# Patient Record
Sex: MALE | Race: WHITE | Employment: OTHER | URBAN - METROPOLITAN AREA
[De-identification: names, ages, dates, MRNs, and addresses within clinical notes are randomized per-mention and may not be internally consistent; named-entity substitution may affect disease eponyms.]

---

## 2017-05-10 RX ORDER — SUMATRIPTAN 50 MG/1
50 TABLET, FILM COATED ORAL DAILY PRN
COMMUNITY
End: 2022-01-01

## 2017-05-10 RX ORDER — CARBIDOPA AND LEVODOPA 25; 100 MG/1; MG/1
1.5 TABLET ORAL 3 TIMES DAILY
COMMUNITY

## 2017-05-10 RX ORDER — OXYCODONE AND ACETAMINOPHEN 5; 325 MG/1; MG/1
1 TABLET ORAL EVERY 4 HOURS PRN
Status: ON HOLD | COMMUNITY
End: 2022-01-01

## 2017-05-10 RX ORDER — SUMATRIPTAN SUCCINATE 6 MG/.5ML
6 INJECTION, SOLUTION SUBCUTANEOUS DAILY PRN
Status: ON HOLD | COMMUNITY
End: 2022-01-01

## 2017-05-11 ENCOUNTER — HOSPITAL ENCOUNTER (OUTPATIENT)
Facility: CLINIC | Age: 67
Discharge: HOME | End: 2017-05-12
Attending: ORTHOPAEDIC SURGERY | Admitting: ORTHOPAEDIC SURGERY
Payer: COMMERCIAL

## 2017-05-11 ENCOUNTER — SURGERY (OUTPATIENT)
Age: 67
End: 2017-05-11

## 2017-05-11 ENCOUNTER — ANESTHESIA (OUTPATIENT)
Dept: SURGERY | Facility: CLINIC | Age: 67
End: 2017-05-11
Payer: COMMERCIAL

## 2017-05-11 ENCOUNTER — ANESTHESIA EVENT (OUTPATIENT)
Dept: SURGERY | Facility: CLINIC | Age: 67
End: 2017-05-11
Payer: COMMERCIAL

## 2017-05-11 DIAGNOSIS — S92.301A: Primary | ICD-10-CM

## 2017-05-11 PROCEDURE — 40000935 ZZH STATISTIC OUTPATIENT (NON-OBS) EVE

## 2017-05-11 PROCEDURE — 37000009 ZZH ANESTHESIA TECHNICAL FEE, EACH ADDTL 15 MIN: Performed by: ORTHOPAEDIC SURGERY

## 2017-05-11 PROCEDURE — S0077 INJECTION, CLINDAMYCIN PHOSP: HCPCS | Performed by: ORTHOPAEDIC SURGERY

## 2017-05-11 PROCEDURE — 25000128 H RX IP 250 OP 636: Performed by: ORTHOPAEDIC SURGERY

## 2017-05-11 PROCEDURE — 71000012 ZZH RECOVERY PHASE 1 LEVEL 1 FIRST HR: Performed by: ORTHOPAEDIC SURGERY

## 2017-05-11 PROCEDURE — 71000013 ZZH RECOVERY PHASE 1 LEVEL 1 EA ADDTL HR: Performed by: ORTHOPAEDIC SURGERY

## 2017-05-11 PROCEDURE — 36000058 ZZH SURGERY LEVEL 3 EA 15 ADDTL MIN: Performed by: ORTHOPAEDIC SURGERY

## 2017-05-11 PROCEDURE — 37000008 ZZH ANESTHESIA TECHNICAL FEE, 1ST 30 MIN: Performed by: ORTHOPAEDIC SURGERY

## 2017-05-11 PROCEDURE — 25000128 H RX IP 250 OP 636: Performed by: NURSE ANESTHETIST, CERTIFIED REGISTERED

## 2017-05-11 PROCEDURE — 40000170 ZZH STATISTIC PRE-PROCEDURE ASSESSMENT II: Performed by: ORTHOPAEDIC SURGERY

## 2017-05-11 PROCEDURE — 40000936 ZZH STATISTIC OUTPATIENT (NON-OBS) NIGHT

## 2017-05-11 PROCEDURE — S0020 INJECTION, BUPIVICAINE HYDRO: HCPCS | Performed by: ORTHOPAEDIC SURGERY

## 2017-05-11 PROCEDURE — 25000125 ZZHC RX 250: Performed by: ANESTHESIOLOGY

## 2017-05-11 PROCEDURE — 27210794 ZZH OR GENERAL SUPPLY STERILE: Performed by: ORTHOPAEDIC SURGERY

## 2017-05-11 PROCEDURE — 25800025 ZZH RX 258: Performed by: NURSE ANESTHETIST, CERTIFIED REGISTERED

## 2017-05-11 PROCEDURE — 25000125 ZZHC RX 250: Performed by: NURSE ANESTHETIST, CERTIFIED REGISTERED

## 2017-05-11 PROCEDURE — C1713 ANCHOR/SCREW BN/BN,TIS/BN: HCPCS | Performed by: ORTHOPAEDIC SURGERY

## 2017-05-11 PROCEDURE — 36000056 ZZH SURGERY LEVEL 3 1ST 30 MIN: Performed by: ORTHOPAEDIC SURGERY

## 2017-05-11 PROCEDURE — 25000125 ZZHC RX 250: Performed by: ORTHOPAEDIC SURGERY

## 2017-05-11 PROCEDURE — 27110028 ZZH OR GENERAL SUPPLY NON-STERILE: Performed by: ORTHOPAEDIC SURGERY

## 2017-05-11 PROCEDURE — 25000132 ZZH RX MED GY IP 250 OP 250 PS 637: Performed by: ORTHOPAEDIC SURGERY

## 2017-05-11 PROCEDURE — 25800025 ZZH RX 258: Performed by: ORTHOPAEDIC SURGERY

## 2017-05-11 PROCEDURE — 27210995 ZZH RX 272: Performed by: ORTHOPAEDIC SURGERY

## 2017-05-11 PROCEDURE — 40000934 ZZH STATISTIC OUTPATIENT (NON-OBS) DAY

## 2017-05-11 DEVICE — IMPLANTABLE DEVICE: Type: IMPLANTABLE DEVICE | Site: FOOT | Status: FUNCTIONAL

## 2017-05-11 DEVICE — IMP WIRE KIRSCHNER 0.054X4" 78.2040: Type: IMPLANTABLE DEVICE | Site: TOE | Status: FUNCTIONAL

## 2017-05-11 RX ORDER — CLINDAMYCIN PHOSPHATE 900 MG/50ML
900 INJECTION, SOLUTION INTRAVENOUS EVERY 8 HOURS
Status: COMPLETED | OUTPATIENT
Start: 2017-05-11 | End: 2017-05-12

## 2017-05-11 RX ORDER — LIDOCAINE HYDROCHLORIDE 20 MG/ML
INJECTION, SOLUTION INFILTRATION; PERINEURAL PRN
Status: DISCONTINUED | OUTPATIENT
Start: 2017-05-11 | End: 2017-05-11

## 2017-05-11 RX ORDER — SUMATRIPTAN 6 MG/.5ML
6 INJECTION, SOLUTION SUBCUTANEOUS EVERY 12 HOURS PRN
Status: DISCONTINUED | OUTPATIENT
Start: 2017-05-11 | End: 2017-05-12 | Stop reason: HOSPADM

## 2017-05-11 RX ORDER — HYDROMORPHONE HYDROCHLORIDE 1 MG/ML
.3-.5 INJECTION, SOLUTION INTRAMUSCULAR; INTRAVENOUS; SUBCUTANEOUS EVERY 10 MIN PRN
Status: DISCONTINUED | OUTPATIENT
Start: 2017-05-11 | End: 2017-05-11 | Stop reason: HOSPADM

## 2017-05-11 RX ORDER — BUPIVACAINE HYDROCHLORIDE 7.5 MG/ML
INJECTION, SOLUTION INTRASPINAL PRN
Status: DISCONTINUED | OUTPATIENT
Start: 2017-05-11 | End: 2017-05-11

## 2017-05-11 RX ORDER — NALOXONE HYDROCHLORIDE 0.4 MG/ML
.1-.4 INJECTION, SOLUTION INTRAMUSCULAR; INTRAVENOUS; SUBCUTANEOUS
Status: DISCONTINUED | OUTPATIENT
Start: 2017-05-11 | End: 2017-05-12 | Stop reason: HOSPADM

## 2017-05-11 RX ORDER — PROPRANOLOL HCL 60 MG
60 CAPSULE, EXTENDED RELEASE 24HR ORAL EVERY EVENING
Status: DISCONTINUED | OUTPATIENT
Start: 2017-05-11 | End: 2017-05-12 | Stop reason: HOSPADM

## 2017-05-11 RX ORDER — ONDANSETRON 2 MG/ML
4 INJECTION INTRAMUSCULAR; INTRAVENOUS EVERY 6 HOURS PRN
Status: DISCONTINUED | OUTPATIENT
Start: 2017-05-11 | End: 2017-05-12 | Stop reason: HOSPADM

## 2017-05-11 RX ORDER — LACTOBACILLUS RHAMNOSUS GG 10B CELL
1 CAPSULE ORAL EVERY EVENING
Status: DISCONTINUED | OUTPATIENT
Start: 2017-05-11 | End: 2017-05-12 | Stop reason: HOSPADM

## 2017-05-11 RX ORDER — OXYCODONE HYDROCHLORIDE 5 MG/1
5-10 TABLET ORAL
Status: DISCONTINUED | OUTPATIENT
Start: 2017-05-11 | End: 2017-05-12 | Stop reason: HOSPADM

## 2017-05-11 RX ORDER — OXYCODONE HYDROCHLORIDE 5 MG/1
5-10 TABLET ORAL
Qty: 60 TABLET | Refills: 0 | Status: ON HOLD | OUTPATIENT
Start: 2017-05-11 | End: 2022-01-01

## 2017-05-11 RX ORDER — ASPIRIN 81 MG/1
81 TABLET ORAL DAILY
Status: DISCONTINUED | OUTPATIENT
Start: 2017-05-11 | End: 2017-05-12 | Stop reason: HOSPADM

## 2017-05-11 RX ORDER — ACETAMINOPHEN 325 MG/1
650 TABLET ORAL EVERY 6 HOURS PRN
Qty: 60 TABLET | Refills: 0 | Status: ON HOLD | OUTPATIENT
Start: 2017-05-11 | End: 2022-01-01

## 2017-05-11 RX ORDER — SODIUM CHLORIDE, SODIUM LACTATE, POTASSIUM CHLORIDE, CALCIUM CHLORIDE 600; 310; 30; 20 MG/100ML; MG/100ML; MG/100ML; MG/100ML
INJECTION, SOLUTION INTRAVENOUS CONTINUOUS
Status: DISCONTINUED | OUTPATIENT
Start: 2017-05-11 | End: 2017-05-11 | Stop reason: HOSPADM

## 2017-05-11 RX ORDER — BUPIVACAINE HYDROCHLORIDE 2.5 MG/ML
INJECTION, SOLUTION EPIDURAL; INFILTRATION; INTRACAUDAL PRN
Status: DISCONTINUED | OUTPATIENT
Start: 2017-05-11 | End: 2017-05-11 | Stop reason: HOSPADM

## 2017-05-11 RX ORDER — ONDANSETRON 4 MG/1
4 TABLET, ORALLY DISINTEGRATING ORAL EVERY 30 MIN PRN
Status: DISCONTINUED | OUTPATIENT
Start: 2017-05-11 | End: 2017-05-11 | Stop reason: HOSPADM

## 2017-05-11 RX ORDER — AMITRIPTYLINE HYDROCHLORIDE 10 MG/1
20 TABLET ORAL AT BEDTIME
Status: DISCONTINUED | OUTPATIENT
Start: 2017-05-11 | End: 2017-05-12 | Stop reason: HOSPADM

## 2017-05-11 RX ORDER — CEFAZOLIN SODIUM 2 G/100ML
2 INJECTION, SOLUTION INTRAVENOUS
Status: DISCONTINUED | OUTPATIENT
Start: 2017-05-11 | End: 2017-05-11 | Stop reason: HOSPADM

## 2017-05-11 RX ORDER — FENTANYL CITRATE 50 UG/ML
25-50 INJECTION, SOLUTION INTRAMUSCULAR; INTRAVENOUS
Status: DISCONTINUED | OUTPATIENT
Start: 2017-05-11 | End: 2017-05-11 | Stop reason: HOSPADM

## 2017-05-11 RX ORDER — FENTANYL CITRATE 50 UG/ML
50-100 INJECTION, SOLUTION INTRAMUSCULAR; INTRAVENOUS
Status: DISCONTINUED | OUTPATIENT
Start: 2017-05-11 | End: 2017-05-12 | Stop reason: HOSPADM

## 2017-05-11 RX ORDER — CLINDAMYCIN HCL 150 MG
150 CAPSULE ORAL 4 TIMES DAILY
Qty: 8 CAPSULE | Refills: 0 | Status: SHIPPED | OUTPATIENT
Start: 2017-05-11 | End: 2017-05-13

## 2017-05-11 RX ORDER — ONDANSETRON 4 MG/1
4 TABLET, ORALLY DISINTEGRATING ORAL EVERY 6 HOURS PRN
Status: DISCONTINUED | OUTPATIENT
Start: 2017-05-11 | End: 2017-05-12 | Stop reason: HOSPADM

## 2017-05-11 RX ORDER — BACLOFEN 10 MG/1
10 TABLET ORAL DAILY PRN
Status: DISCONTINUED | OUTPATIENT
Start: 2017-05-11 | End: 2017-05-12 | Stop reason: HOSPADM

## 2017-05-11 RX ORDER — PROPOFOL 10 MG/ML
INJECTION, EMULSION INTRAVENOUS CONTINUOUS PRN
Status: DISCONTINUED | OUTPATIENT
Start: 2017-05-11 | End: 2017-05-11

## 2017-05-11 RX ORDER — CEFAZOLIN SODIUM 1 G/3ML
1 INJECTION, POWDER, FOR SOLUTION INTRAMUSCULAR; INTRAVENOUS SEE ADMIN INSTRUCTIONS
Status: DISCONTINUED | OUTPATIENT
Start: 2017-05-11 | End: 2017-05-11 | Stop reason: HOSPADM

## 2017-05-11 RX ORDER — PROCHLORPERAZINE MALEATE 5 MG
5 TABLET ORAL EVERY 6 HOURS PRN
Status: DISCONTINUED | OUTPATIENT
Start: 2017-05-11 | End: 2017-05-11

## 2017-05-11 RX ORDER — ACETAMINOPHEN 325 MG/1
650 TABLET ORAL EVERY 6 HOURS PRN
Status: DISCONTINUED | OUTPATIENT
Start: 2017-05-11 | End: 2017-05-12 | Stop reason: HOSPADM

## 2017-05-11 RX ORDER — IBUPROFEN 600 MG/1
600 TABLET, FILM COATED ORAL EVERY 6 HOURS PRN
Qty: 75 TABLET | Refills: 0 | Status: ON HOLD | OUTPATIENT
Start: 2017-05-11 | End: 2022-01-01

## 2017-05-11 RX ORDER — HYDROXYZINE HYDROCHLORIDE 10 MG/1
10 TABLET, FILM COATED ORAL EVERY 6 HOURS PRN
Status: DISCONTINUED | OUTPATIENT
Start: 2017-05-11 | End: 2017-05-12 | Stop reason: HOSPADM

## 2017-05-11 RX ORDER — SODIUM CHLORIDE, SODIUM LACTATE, POTASSIUM CHLORIDE, CALCIUM CHLORIDE 600; 310; 30; 20 MG/100ML; MG/100ML; MG/100ML; MG/100ML
INJECTION, SOLUTION INTRAVENOUS CONTINUOUS PRN
Status: DISCONTINUED | OUTPATIENT
Start: 2017-05-11 | End: 2017-05-11

## 2017-05-11 RX ORDER — SUMATRIPTAN 50 MG/1
100 TABLET, FILM COATED ORAL DAILY PRN
Status: DISCONTINUED | OUTPATIENT
Start: 2017-05-11 | End: 2017-05-12 | Stop reason: HOSPADM

## 2017-05-11 RX ORDER — HYDROMORPHONE HYDROCHLORIDE 1 MG/ML
.3-.5 INJECTION, SOLUTION INTRAMUSCULAR; INTRAVENOUS; SUBCUTANEOUS
Status: DISCONTINUED | OUTPATIENT
Start: 2017-05-11 | End: 2017-05-12 | Stop reason: HOSPADM

## 2017-05-11 RX ORDER — CLINDAMYCIN PHOSPHATE 600 MG/50ML
600 INJECTION, SOLUTION INTRAVENOUS ONCE
Status: COMPLETED | OUTPATIENT
Start: 2017-05-11 | End: 2017-05-11

## 2017-05-11 RX ORDER — KETOROLAC TROMETHAMINE 30 MG/ML
INJECTION, SOLUTION INTRAMUSCULAR; INTRAVENOUS PRN
Status: DISCONTINUED | OUTPATIENT
Start: 2017-05-11 | End: 2017-05-11

## 2017-05-11 RX ORDER — KETOROLAC TROMETHAMINE 15 MG/ML
15 INJECTION, SOLUTION INTRAMUSCULAR; INTRAVENOUS EVERY 6 HOURS
Status: COMPLETED | OUTPATIENT
Start: 2017-05-11 | End: 2017-05-12

## 2017-05-11 RX ORDER — ONDANSETRON 2 MG/ML
INJECTION INTRAMUSCULAR; INTRAVENOUS PRN
Status: DISCONTINUED | OUTPATIENT
Start: 2017-05-11 | End: 2017-05-11

## 2017-05-11 RX ORDER — IBUPROFEN 600 MG/1
600 TABLET, FILM COATED ORAL EVERY 6 HOURS PRN
Status: DISCONTINUED | OUTPATIENT
Start: 2017-05-12 | End: 2017-05-12 | Stop reason: HOSPADM

## 2017-05-11 RX ORDER — CARBIDOPA AND LEVODOPA 25; 100 MG/1; MG/1
1 TABLET ORAL 3 TIMES DAILY
Status: DISCONTINUED | OUTPATIENT
Start: 2017-05-11 | End: 2017-05-12 | Stop reason: HOSPADM

## 2017-05-11 RX ORDER — SODIUM CHLORIDE, SODIUM LACTATE, POTASSIUM CHLORIDE, CALCIUM CHLORIDE 600; 310; 30; 20 MG/100ML; MG/100ML; MG/100ML; MG/100ML
INJECTION, SOLUTION INTRAVENOUS CONTINUOUS
Status: DISCONTINUED | OUTPATIENT
Start: 2017-05-11 | End: 2017-05-12 | Stop reason: HOSPADM

## 2017-05-11 RX ORDER — ONDANSETRON 2 MG/ML
4 INJECTION INTRAMUSCULAR; INTRAVENOUS EVERY 30 MIN PRN
Status: DISCONTINUED | OUTPATIENT
Start: 2017-05-11 | End: 2017-05-11 | Stop reason: HOSPADM

## 2017-05-11 RX ORDER — NALOXONE HYDROCHLORIDE 0.4 MG/ML
.1-.4 INJECTION, SOLUTION INTRAMUSCULAR; INTRAVENOUS; SUBCUTANEOUS
Status: DISCONTINUED | OUTPATIENT
Start: 2017-05-11 | End: 2017-05-11 | Stop reason: HOSPADM

## 2017-05-11 RX ADMIN — SODIUM CHLORIDE, POTASSIUM CHLORIDE, SODIUM LACTATE AND CALCIUM CHLORIDE: 600; 310; 30; 20 INJECTION, SOLUTION INTRAVENOUS at 13:15

## 2017-05-11 RX ADMIN — Medication 1 CAPSULE: at 22:19

## 2017-05-11 RX ADMIN — MIDAZOLAM HYDROCHLORIDE 2 MG: 1 INJECTION, SOLUTION INTRAMUSCULAR; INTRAVENOUS at 07:40

## 2017-05-11 RX ADMIN — PHENYLEPHRINE HYDROCHLORIDE 50 MCG: 10 INJECTION, SOLUTION INTRAMUSCULAR; INTRAVENOUS; SUBCUTANEOUS at 08:22

## 2017-05-11 RX ADMIN — BUPIVACAINE HYDROCHLORIDE IN DEXTROSE 12.5 MG: 7.5 INJECTION, SOLUTION SUBARACHNOID at 07:45

## 2017-05-11 RX ADMIN — Medication 0.3 MCG/KG/MIN: at 08:46

## 2017-05-11 RX ADMIN — ACETAMINOPHEN 650 MG: 325 TABLET, FILM COATED ORAL at 18:48

## 2017-05-11 RX ADMIN — HYDROMORPHONE HYDROCHLORIDE 0.5 MG: 1 INJECTION, SOLUTION INTRAMUSCULAR; INTRAVENOUS; SUBCUTANEOUS at 14:14

## 2017-05-11 RX ADMIN — LIDOCAINE HYDROCHLORIDE 10 ML: 20 JELLY TOPICAL at 18:16

## 2017-05-11 RX ADMIN — KETOROLAC TROMETHAMINE 15 MG: 15 INJECTION, SOLUTION INTRAMUSCULAR; INTRAVENOUS at 15:43

## 2017-05-11 RX ADMIN — PROPRANOLOL HYDROCHLORIDE 60 MG: 60 CAPSULE, EXTENDED RELEASE ORAL at 22:20

## 2017-05-11 RX ADMIN — PHENYLEPHRINE HYDROCHLORIDE 100 MCG: 10 INJECTION, SOLUTION INTRAMUSCULAR; INTRAVENOUS; SUBCUTANEOUS at 08:15

## 2017-05-11 RX ADMIN — SODIUM CHLORIDE, POTASSIUM CHLORIDE, SODIUM LACTATE AND CALCIUM CHLORIDE: 600; 310; 30; 20 INJECTION, SOLUTION INTRAVENOUS at 07:40

## 2017-05-11 RX ADMIN — CARBIDOPA AND LEVODOPA 1 TABLET: 25; 100 TABLET ORAL at 22:18

## 2017-05-11 RX ADMIN — KETOROLAC TROMETHAMINE 15 MG: 15 INJECTION, SOLUTION INTRAMUSCULAR; INTRAVENOUS at 22:20

## 2017-05-11 RX ADMIN — PHENYLEPHRINE HYDROCHLORIDE 100 MCG: 10 INJECTION, SOLUTION INTRAMUSCULAR; INTRAVENOUS; SUBCUTANEOUS at 08:09

## 2017-05-11 RX ADMIN — PHENYLEPHRINE HYDROCHLORIDE 50 MCG: 10 INJECTION, SOLUTION INTRAMUSCULAR; INTRAVENOUS; SUBCUTANEOUS at 08:37

## 2017-05-11 RX ADMIN — OXYCODONE HYDROCHLORIDE 5 MG: 5 TABLET ORAL at 22:19

## 2017-05-11 RX ADMIN — CARBIDOPA AND LEVODOPA 1 TABLET: 25; 100 TABLET ORAL at 15:43

## 2017-05-11 RX ADMIN — FENTANYL CITRATE 50 MCG: 50 INJECTION, SOLUTION INTRAMUSCULAR; INTRAVENOUS at 12:28

## 2017-05-11 RX ADMIN — OXYCODONE HYDROCHLORIDE 5 MG: 5 TABLET ORAL at 18:48

## 2017-05-11 RX ADMIN — GENTAMICIN SULFATE 2000 ML: 40 INJECTION, SOLUTION INTRAMUSCULAR; INTRAVENOUS at 08:41

## 2017-05-11 RX ADMIN — CLINDAMYCIN PHOSPHATE 600 MG: 12 INJECTION, SOLUTION INTRAVENOUS at 11:06

## 2017-05-11 RX ADMIN — ONDANSETRON 4 MG: 2 INJECTION INTRAMUSCULAR; INTRAVENOUS at 07:56

## 2017-05-11 RX ADMIN — SODIUM CHLORIDE, POTASSIUM CHLORIDE, SODIUM LACTATE AND CALCIUM CHLORIDE: 600; 310; 30; 20 INJECTION, SOLUTION INTRAVENOUS at 08:47

## 2017-05-11 RX ADMIN — PHENYLEPHRINE HYDROCHLORIDE 100 MCG: 10 INJECTION, SOLUTION INTRAMUSCULAR; INTRAVENOUS; SUBCUTANEOUS at 08:24

## 2017-05-11 RX ADMIN — PHENYLEPHRINE HYDROCHLORIDE 50 MCG: 10 INJECTION, SOLUTION INTRAMUSCULAR; INTRAVENOUS; SUBCUTANEOUS at 08:04

## 2017-05-11 RX ADMIN — KETOROLAC TROMETHAMINE 30 MG: 30 INJECTION, SOLUTION INTRAMUSCULAR at 10:21

## 2017-05-11 RX ADMIN — LIDOCAINE HYDROCHLORIDE 40 MG: 20 INJECTION, SOLUTION INFILTRATION; PERINEURAL at 10:07

## 2017-05-11 RX ADMIN — AMITRIPTYLINE HYDROCHLORIDE 20 MG: 10 TABLET, FILM COATED ORAL at 22:18

## 2017-05-11 RX ADMIN — CLINDAMYCIN PHOSPHATE 900 MG: 18 INJECTION, SOLUTION INTRAVENOUS at 07:45

## 2017-05-11 RX ADMIN — PROPOFOL 150 MCG/KG/MIN: 10 INJECTION, EMULSION INTRAVENOUS at 07:56

## 2017-05-11 RX ADMIN — PHENYLEPHRINE HYDROCHLORIDE 50 MCG: 10 INJECTION, SOLUTION INTRAMUSCULAR; INTRAVENOUS; SUBCUTANEOUS at 08:38

## 2017-05-11 RX ADMIN — CLINDAMYCIN PHOSPHATE 900 MG: 18 INJECTION, SOLUTION INTRAVENOUS at 18:48

## 2017-05-11 RX ADMIN — BUPIVACAINE HYDROCHLORIDE 10 ML: 2.5 INJECTION, SOLUTION EPIDURAL; INFILTRATION; INTRACAUDAL at 09:50

## 2017-05-11 ASSESSMENT — LIFESTYLE VARIABLES: TOBACCO_USE: 0

## 2017-05-11 ASSESSMENT — COPD QUESTIONNAIRES: COPD: 0

## 2017-05-11 NOTE — ANESTHESIA POSTPROCEDURE EVALUATION
Patient: Meir Richardson    Procedure(s):  RIGHT FIRST MTP FUSION, FOREFOOR RECONSTRUCTION - Wound Class: I-Clean    Diagnosis:RIGHT LESSER TOE FRACTURES   Diagnosis Additional Information: No value filed.    Anesthesia Type:  Spinal    Note:  Anesthesia Post Evaluation    Patient location during evaluation: PACU  Patient participation: Able to fully participate in evaluation  Level of consciousness: awake and alert  Pain management: adequate  Airway patency: patent  Cardiovascular status: acceptable  Respiratory status: acceptable  Hydration status: acceptable  PONV: none     Anesthetic complications: None          Last vitals:  Vitals:    05/11/17 1200 05/11/17 1215 05/11/17 1230   BP: 102/71 101/70 101/73   Resp: 10 14 12   Temp: 36  C (96.8  F) 36.1  C (97  F) 36.2  C (97.2  F)   SpO2: 97% 98% 98%         Electronically Signed By: Jhon Yee MD  May 11, 2017  1:02 PM

## 2017-05-11 NOTE — OR NURSING
Patient transferred to Observation Unit via cart.  Detailed report given to CRUZ Hickey.  All belongings sent.  Family notified.

## 2017-05-11 NOTE — PLAN OF CARE
Problem: Goal Outcome Summary  Goal: Goal Outcome Summary  Outcome: Improving  Patient alert and oriented. VSS. On regular diet. IV infusing. IV Dilaudid given for pain. Incision CDI.

## 2017-05-11 NOTE — PLAN OF CARE
Problem: Goal Outcome Summary  Goal: Goal Outcome Summary  Outcome: Improving  Patient is alert and oriented. IV Infusing. C/o pain at incision site. IV Dilaudid given PRN. VSS. On Regular diet.

## 2017-05-11 NOTE — PROGRESS NOTES
Admission medication history interview status for the 5/11/2017  admission is complete. See EPIC admission navigator for prior to admission medications     Medication history source reliability:Good    Medication history interview source(s):Patient    Medication history resources (including written lists, pill bottles, clinic record):None    Primary pharmacy.[facility]    Additional medication history information not noted on PTA med list :None    Time spent in this activity: 45 minutes    Prior to Admission medications    Medication Sig Last Dose Taking? Auth Provider   isometheptene-dichloralphenazone-acetaminophen (MIDRIN) -325 MG per capsule Take 1 capsule by mouth 4 times daily as needed for headaches more than a month at prn Yes Reported, Patient   Amitriptyline HCl (ELAVIL PO) Take 20 mg by mouth At Bedtime (2 x 10 mg tablet = 20 mg dose) 5/10/2017 at 2230 Yes Reported, Patient   ASPIRIN EC PO Take 81 mg by mouth daily more than 2 weeks Yes Reported, Patient   BACLOFEN PO Take 10 mg by mouth daily as needed for muscle spasms  5/10/2017 at 1600 Yes Reported, Patient   carbidopa-levodopa (SINEMET)  MG per tablet Take 1 tablet by mouth 3 times daily With food 5/11/2017 at 0430 Yes Reported, Patient   Probiotic Product (PROBIOTIC DAILY PO) Take 1-3 tablets by mouth every evening  5/9/2017 at pm Yes Reported, Patient   oxyCODONE-acetaminophen (PERCOCET) 5-325 MG per tablet Take 1 tablet by mouth every 4 hours as needed for moderate to severe pain 5/8/2017 at prn Yes Reported, Patient   Propranolol HCl (INDERAL LA PO) Take 60 mg by mouth every evening  5/10/2017 at pm Yes Reported, Patient   SUMAtriptan Succinate (IMITREX PO) Take 100 mg by mouth daily as needed for migraine 5/10/2017 at prn Yes Reported, Patient   SUMAtriptan Succinate Refill (IMITREX) 6 MG/0.5ML SOCT Inject 6 mg Subcutaneous as needed for migraine 5/9/2017 at prn Yes Reported, Patient

## 2017-05-11 NOTE — ANESTHESIA PROCEDURE NOTES
Peripheral nerve/Neuraxial procedure note : intrathecal  Pre-Procedure  Performed by SUAD NICOLE  Location: OR      Pre-Anesthestic Checklist: patient identified, IV checked, risks and benefits discussed, informed consent, monitors and equipment checked and pre-op evaluation    Timeout  Correct Patient: Yes   Correct Procedure: Yes   Correct Site: Yes   Correct Laterality: N/A   Correct Position: Yes   Site Marked: N/A   .   Procedure Documentation    .    Procedure:    Intrathecal.  Insertion Site:L4-5  (midline approach)      Patient Prep;mask, sterile gloves, povidone-iodine 7.5% surgical scrub, patient draped.  .  Needle: (). # of attempts: 1. # of redirects:. Spinal Needle: Maryellen tip 25 G. 3.5 in.  Introducer used. . .     Assessment/Narrative  Paresthesias: No.  .  .  clear CSF fluid removed . Comments:  No pain with injection, questions answered about procedure.

## 2017-05-11 NOTE — ANESTHESIA PREPROCEDURE EVALUATION
Anesthesia Evaluation     . Pt has had prior anesthetic.     History of anesthetic complications   - PONV        ROS/MED HX    ENT/Pulmonary:      (-) tobacco use, asthma, COPD and sleep apnea   Neurologic:     (+)migraines, Parkinson's disease features: right arm swings less than left,     Cardiovascular: Comment: Ablation 6/16  Orthostatic hypotension cause of syncope?    (+) ----. : . . fainting (syncope). :. Irregular Heartbeat/Palpitations, .      (-) hypertension, CAD and dyslipidemia   METS/Exercise Tolerance:     Hematologic:         Musculoskeletal:         GI/Hepatic:        (-) GERD and liver disease   Renal/Genitourinary:      (-) renal disease   Endo:      (-) Type I DM and Type II DM   Psychiatric:         Infectious Disease:         Malignancy:         Other: Comment: Behcet's syndrome                    Physical Exam  Normal systems: cardiovascular and pulmonary    Airway   Mallampati: II  TM distance: >3 FB  Neck ROM: full    Dental   (+) caps    Cardiovascular       Pulmonary                     Anesthesia Plan      History & Physical Review  History and physical reviewed and following examination; no interval change.    ASA Status:  2 .    NPO Status:  > 8 hours    Plan for Spinal   PONV prophylaxis:  Ondansetron (or other 5HT-3)       Postoperative Care  Postoperative pain management:  IV analgesics.      Consents  Anesthetic plan, risks, benefits and alternatives discussed with:  Patient..                          .

## 2017-05-11 NOTE — ANESTHESIA CARE TRANSFER NOTE
Patient: Meir Richardson    Procedure(s):  RIGHT FIRST MTP FUSION, FOREFOOR RECONSTRUCTION - Wound Class: I-Clean    Diagnosis: RIGHT LESSER TOE FRACTURES   Diagnosis Additional Information: No value filed.    Anesthesia Type:   Spinal     Note:  Airway :Face Mask  Patient transferred to:PACU  Comments: Uneventful transport to PACU   Report to RN  Exchanging well  Pt sleepy; responds to voice command; oral AW removed  IV patent  Lips/teeth/dentition as preop status  Questions answered  /70  HR 73 nsr  T Spot On 96.2 and rising  RR  16  Sat 100%      Vitals: (Last set prior to Anesthesia Care Transfer)    CRNA VITALS  5/11/2017 1006 - 5/11/2017 1036      5/11/2017             Resp Rate (set): 10                Electronically Signed By: MANUEL AMBROSE CRNA  May 11, 2017  10:36 AM

## 2017-05-11 NOTE — IP AVS SNAPSHOT
[facility] Observation Unit    [address] [place] [address]    Phone:  [phone]                                       After Visit Summary   5/11/2017    Meir Richardson    MRN: [id number]           After Visit Summary Signature Page     I have received my discharge instructions, and my questions have been answered. I have discussed any challenges I see with this plan with the nurse or doctor.    ..........................................................................................................................................  Patient/Patient Representative Signature      ..........................................................................................................................................  Patient Representative Print Name and Relationship to Patient    ..................................................               ................................................  Date                                            Time    ..........................................................................................................................................  Reviewed by Signature/Title    ...................................................              ..............................................  Date                                                            Time

## 2017-05-11 NOTE — IP AVS SNAPSHOT
MRN:[id number]                      After Visit Summary   5/11/2017    Meir Richardson    MRN: [id number]           Thank you!     Thank you for choosing [facility] for your care. Our goal is always to provide you with excellent care. Hearing back from our patients is one way we can continue to improve our services. Please take a few minutes to complete the written survey that you may receive in the mail after you visit with us. Thank you!        Patient Information     Date Of Birth          1950        About your hospital stay     You were admitted on:  May 11, 2017 You last received care in the:  [facility] Observation Unit    You were discharged on:  May 12, 2017       Who to Call     For medical emergencies, please call [phone].  For non-urgent questions about your medical care, please call your primary care provider or clinic, [phone]  For questions related to your surgery, please call your surgery clinic        Attending Provider     Provider Specialty    Yfn Ulloa MD Orthopedics       Primary Care Provider Office Phone # Fax #    Zelalem Gutierrez [phone]       [facility] [address] [address]        After Care Instructions     Weight bearing status - As tolerated                 Further instructions from your care team       Post-Operative Instructions Foot Surgery Patients  Yfn Ulloa M.D.    Board Certified  Fellowship, Foot and Ankle Surgery  Member, American Academy of Orthopaedic Surgeons  American Orthopaedic Foot and Ankle Society    Direct Phone 9:00am to 5:00pm [phone]  After Hours/24 Hour On Call [phone]      Diet:  ? Take all prescribed medications with food   ? Narcotic pain medication can cause constipation  ? Avoid alcoholic beverages while taking pain medications   ? Increase dietary fiber, protein rich foods, fluids post operatively    Activity:  ? Elevate operative foot 90% of the  "time.  ? \"Swelling runs downhill\" - the more you elevate, the less permanent swelling you will experience  ? Post Op shoe/sandal must be on at all times with weight bearing  ? Unless told otherwise, you may put full weight on your foot  ? Walk with a flat foot  ? Use crutches/walker/cane as needed for balance and comfort  ? Do NOT walk on your heel, this pulls against possible pins/screws in your toes  ? You may be up to the bathroom, to the kitchen for meals and to change locations in the house.  ? You may do sit-ups (NOT BONE GRAFT PATIENTS), leg raises, upper body exercises  ? Every time you see a commercial on TV, change a web page or book page, perform ANKLE PUMPS  ? ANKLE PUMPS helps prevent a blood clot, pump swelling back to you heart  ? Adjust your immobilized foot/ankle to relieve pressure on your heel  ? Do not try to wiggle your toes    Special Care Instructions:     ? DO NOT CHANGE OR ALTER THE DRESSING  ? Keep your dressing(s) dry;    ? Sponge bath or wrap seal your foot (with shoe on) for shower  ? It is normal to have some incisional drainage  ? It is not unusual to have some \"numbness\" in foot and ankle following surgery  ? Smoking should be avoided.  It will interfere in your healing.  ? Check pins daily:  ? Do not push pin in if it comes out  ? Do not pull pin out if it goes in          Report to your Doctor if any of the following occur:  ? Elevated temperature, 101o or higher  ? Increased pain unrelieved by pain medication and/or elevation  ? Tight/loose/wet dressing  ? Increased swelling  ? Calf pain  ? Pin drainage  ? Pin migration    Pin out over 1  from tip of toe to tip of white cap    White cap comes off    White cap is pushing on tip of toe (no metal showing)  ? For any shortness of breath, DIAL 911, go to the Emergency Room      Medications:  ? Take all of the following medications with food.    ? Aspirin/Ecotrin 325 mg.:  1 tab 1x/day  ? This is for blood clot prevention  ? If you have " "sensitive stomach, Ecotrin can be less irritating  ? If you experience any unusual bruising or bleeding or ringing in the ears, stop this medication and call us  ? Oxycodone  1-2 every 3-4 hours as needed for pain  ? You may take 1 tablet with plain Tylenol or Ibuprofen for less severe pain or to decrease nausea/mental effect  ? Ibuprofen 1 every 6 hours as needed for inflammatory pain        Your Next Appointment:    ? Appt. scheduled for 17 @ 1:30 pm        Direct Phone 9:00am to 5:00pm [phone]    After Hours/24 Hour On Call [phone]      Pending Results     No orders found for last 3 day(s).            Admission Information     Date & Time Provider Department Dept. Phone    2017 Yfn Ulloa MD [facility] Observation Unit [phone]      Your Vitals Were     Blood Pressure Temperature Respirations Height Weight Pulse Oximetry    98/60 (BP Location: Right arm) 98.1  F (36.7  C) (Oral) 16 1.854 m (6' 1\") 77.4 kg (170 lb 11.2 oz) 98%    BMI (Body Mass Index)                   22.52 kg/m2           MyChart Information     [facility] lets you send messages to your doctor, view your test results, renew your prescriptions, schedule appointments and more. To sign up, go to [email or web address] . Click on \"Log in\" on the left side of the screen, which will take you to the Welcome page. Then click on \"Sign up Now\" on the right side of the page.     You will be asked to enter the access code listed below, as well as some personal information. Please follow the directions to create your username and password.     Your access code is: [email or web address]  Expires: 8/10/2017  8:20 AM     Your access code will  in 90 days. If you need help or a new code, please call your [facility] clinic or [phone].        Care EveryWhere ID     This is your Care EveryWhere ID. This could be used by other organizations to access your [facility] medical records  [id number]           Review of your medicines "      START taking        Dose / Directions    acetaminophen 325 MG tablet   Commonly known as:  TYLENOL   Used for:  Displaced fracture of metatarsal bone of right foot, unspecified metatarsal, initial encounter        Dose:  650 mg   Take 2 tablets (650 mg) by mouth every 6 hours as needed for mild pain   Quantity:  60 tablet   Refills:  0       clindamycin 150 MG capsule   Commonly known as:  CLEOCIN   Used for:  Displaced fracture of metatarsal bone of right foot, unspecified metatarsal, initial encounter        Dose:  150 mg   Take 1 capsule (150 mg) by mouth 4 times daily for 2 days   Quantity:  8 capsule   Refills:  0       ibuprofen 600 MG tablet   Commonly known as:  ADVIL/MOTRIN   Used for:  Displaced fracture of metatarsal bone of right foot, unspecified metatarsal, initial encounter        Dose:  600 mg   Take 1 tablet (600 mg) by mouth every 6 hours as needed for other (inflammatory pain)   Quantity:  75 tablet   Refills:  0       order for DME   Used for:  Displaced fracture of metatarsal bone of right foot, unspecified metatarsal, initial encounter        ADJUSTABLE LIGHTWEIGHT WALKER   Quantity:  1 Units   Refills:  0       oxyCODONE 5 MG IR tablet   Commonly known as:  ROXICODONE   Used for:  Displaced fracture of metatarsal bone of right foot, unspecified metatarsal, initial encounter        Dose:  5-10 mg   Take 1-2 tablets (5-10 mg) by mouth every 3 hours as needed for moderate to severe pain   Quantity:  60 tablet   Refills:  0         CONTINUE these medicines which have NOT CHANGED        Dose / Directions    ASPIRIN EC PO        Dose:  81 mg   Take 81 mg by mouth daily   Refills:  0       BACLOFEN PO        Dose:  10 mg   Take 10 mg by mouth daily as needed for muscle spasms   Refills:  0       carbidopa-levodopa  MG per tablet   Commonly known as:  SINEMET        Dose:  1 tablet   Take 1 tablet by mouth 3 times daily With food   Refills:  0       ELAVIL PO        Dose:  20 mg   Take 20  mg by mouth At Bedtime (2 x 10 mg tablet = 20 mg dose)   Refills:  0       * IMITREX PO        Dose:  100 mg   Take 100 mg by mouth daily as needed for migraine   Refills:  0       * SUMAtriptan Succinate Refill 6 MG/0.5ML Soct   Commonly known as:  IMITREX        Dose:  6 mg   Inject 6 mg Subcutaneous as needed for migraine   Refills:  0       INDERAL LA PO        Dose:  60 mg   Take 60 mg by mouth every evening   Refills:  0       isometheptene-dichloralphenazone-acetaminophen -325 MG per capsule   Commonly known as:  MIDRIN        Dose:  1 capsule   Take 1 capsule by mouth 4 times daily as needed for headaches   Refills:  0       oxyCODONE-acetaminophen 5-325 MG per tablet   Commonly known as:  PERCOCET        Dose:  1 tablet   Take 1 tablet by mouth every 4 hours as needed for moderate to severe pain   Refills:  0       PROBIOTIC DAILY PO        Dose:  1-3 tablet   Take 1-3 tablets by mouth every evening   Refills:  0       * Notice:  This list has 2 medication(s) that are the same as other medications prescribed for you. Read the directions carefully, and ask your doctor or other care provider to review them with you.         Where to get your medicines      These medications were sent to [facility] [place] - [address]  [address] [facility] [address], [place] [address]     Phone:  [phone]     acetaminophen 325 MG tablet    clindamycin 150 MG capsule    ibuprofen 600 MG tablet         Some of these will need a paper prescription and others can be bought over the counter. Ask your nurse if you have questions.     Bring a paper prescription for each of these medications     order for DME    oxyCODONE 5 MG IR tablet                Protect others around you: Learn how to safely use, store and throw away your medicines at www.disposemymeds.org.             Medication List: This is a list of all your medications and when to take them. Check marks below indicate your daily home  schedule. Keep this list as a reference.      Medications           Morning Afternoon Evening Bedtime As Needed    acetaminophen 325 MG tablet   Commonly known as:  TYLENOL   Take 2 tablets (650 mg) by mouth every 6 hours as needed for mild pain   Last time this was given:  650 mg on 5/12/2017 12:37 AM                                   ASPIRIN EC PO   Take 81 mg by mouth daily   Last time this was given:  81 mg on 5/12/2017  8:16 AM                                   BACLOFEN PO   Take 10 mg by mouth daily as needed for muscle spasms                                   carbidopa-levodopa  MG per tablet   Commonly known as:  SINEMET   Take 1 tablet by mouth 3 times daily With food   Last time this was given:  1 tablet on 5/12/2017  8:16 AM                                         clindamycin 150 MG capsule   Commonly known as:  CLEOCIN   Take 1 capsule (150 mg) by mouth 4 times daily for 2 days                                            ELAVIL PO   Take 20 mg by mouth At Bedtime (2 x 10 mg tablet = 20 mg dose)   Last time this was given:  20 mg on 5/11/2017 10:18 PM                                   ibuprofen 600 MG tablet   Commonly known as:  ADVIL/MOTRIN   Take 1 tablet (600 mg) by mouth every 6 hours as needed for other (inflammatory pain)                                   * IMITREX PO   Take 100 mg by mouth daily as needed for migraine                                   * SUMAtriptan Succinate Refill 6 MG/0.5ML Soct   Commonly known as:  IMITREX   Inject 6 mg Subcutaneous as needed for migraine                                   INDERAL LA PO   Take 60 mg by mouth every evening   Last time this was given:  60 mg on 5/11/2017 10:20 PM                                   isometheptene-dichloralphenazone-acetaminophen -325 MG per capsule   Commonly known as:  MIDRIN   Take 1 capsule by mouth 4 times daily as needed for headaches                                   order for DME   ADJUSTABLE LIGHTWEIGHT WALKER                                 oxyCODONE 5 MG IR tablet   Commonly known as:  ROXICODONE   Take 1-2 tablets (5-10 mg) by mouth every 3 hours as needed for moderate to severe pain   Last time this was given:  5 mg on 5/12/2017  8:18 AM                                   oxyCODONE-acetaminophen 5-325 MG per tablet   Commonly known as:  PERCOCET   Take 1 tablet by mouth every 4 hours as needed for moderate to severe pain                                   PROBIOTIC DAILY PO   Take 1-3 tablets by mouth every evening                                   * Notice:  This list has 2 medication(s) that are the same as other medications prescribed for you. Read the directions carefully, and ask your doctor or other care provider to review them with you.

## 2017-05-12 VITALS
RESPIRATION RATE: 16 BRPM | BODY MASS INDEX: 22.62 KG/M2 | OXYGEN SATURATION: 98 % | DIASTOLIC BLOOD PRESSURE: 60 MMHG | SYSTOLIC BLOOD PRESSURE: 98 MMHG | HEIGHT: 73 IN | TEMPERATURE: 98.1 F | WEIGHT: 170.7 LBS

## 2017-05-12 LAB — GLUCOSE BLDC GLUCOMTR-MCNC: 99 MG/DL (ref 70–99)

## 2017-05-12 PROCEDURE — S0077 INJECTION, CLINDAMYCIN PHOSP: HCPCS | Performed by: ORTHOPAEDIC SURGERY

## 2017-05-12 PROCEDURE — 25000125 ZZHC RX 250: Performed by: ORTHOPAEDIC SURGERY

## 2017-05-12 PROCEDURE — 25000128 H RX IP 250 OP 636: Performed by: ORTHOPAEDIC SURGERY

## 2017-05-12 PROCEDURE — 82962 GLUCOSE BLOOD TEST: CPT

## 2017-05-12 PROCEDURE — 25800025 ZZH RX 258: Performed by: ORTHOPAEDIC SURGERY

## 2017-05-12 PROCEDURE — 40000934 ZZH STATISTIC OUTPATIENT (NON-OBS) DAY

## 2017-05-12 PROCEDURE — 25000132 ZZH RX MED GY IP 250 OP 250 PS 637: Performed by: ORTHOPAEDIC SURGERY

## 2017-05-12 RX ADMIN — ACETAMINOPHEN 650 MG: 325 TABLET, FILM COATED ORAL at 00:37

## 2017-05-12 RX ADMIN — ASPIRIN 81 MG: 81 TABLET, COATED ORAL at 08:16

## 2017-05-12 RX ADMIN — KETOROLAC TROMETHAMINE 15 MG: 15 INJECTION, SOLUTION INTRAMUSCULAR; INTRAVENOUS at 10:34

## 2017-05-12 RX ADMIN — OXYCODONE HYDROCHLORIDE 5 MG: 5 TABLET ORAL at 08:18

## 2017-05-12 RX ADMIN — KETOROLAC TROMETHAMINE 15 MG: 15 INJECTION, SOLUTION INTRAMUSCULAR; INTRAVENOUS at 04:32

## 2017-05-12 RX ADMIN — SODIUM CHLORIDE, POTASSIUM CHLORIDE, SODIUM LACTATE AND CALCIUM CHLORIDE: 600; 310; 30; 20 INJECTION, SOLUTION INTRAVENOUS at 00:37

## 2017-05-12 RX ADMIN — CLINDAMYCIN PHOSPHATE 900 MG: 18 INJECTION, SOLUTION INTRAVENOUS at 04:32

## 2017-05-12 RX ADMIN — CLINDAMYCIN PHOSPHATE 900 MG: 18 INJECTION, SOLUTION INTRAVENOUS at 11:23

## 2017-05-12 RX ADMIN — CARBIDOPA AND LEVODOPA 1 TABLET: 25; 100 TABLET ORAL at 08:16

## 2017-05-12 ASSESSMENT — PAIN DESCRIPTION - DESCRIPTORS: DESCRIPTORS: CONSTANT

## 2017-05-12 NOTE — DISCHARGE INSTRUCTIONS
"Post-Operative Instructions Foot Surgery Patients  Yfn Ulloa M.D.    Board Certified  Fellowship, Foot and Ankle Surgery  Member, American Academy of Orthopaedic Surgeons  American Orthopaedic Foot and Ankle Society    Direct Phone 9:00am to 5:00pm [phone]  After Hours/24 Hour On Call [phone]      Diet:  ? Take all prescribed medications with food   ? Narcotic pain medication can cause constipation  ? Avoid alcoholic beverages while taking pain medications   ? Increase dietary fiber, protein rich foods, fluids post operatively    Activity:  ? Elevate operative foot 90% of the time.  ? \"Swelling runs downhill\" - the more you elevate, the less permanent swelling you will experience  ? Post Op shoe/sandal must be on at all times with weight bearing  ? Unless told otherwise, you may put full weight on your foot  ? Walk with a flat foot  ? Use crutches/walker/cane as needed for balance and comfort  ? Do NOT walk on your heel, this pulls against possible pins/screws in your toes  ? You may be up to the bathroom, to the kitchen for meals and to change locations in the house.  ? You may do sit-ups (NOT BONE GRAFT PATIENTS), leg raises, upper body exercises  ? Every time you see a commercial on TV, change a web page or book page, perform ANKLE PUMPS  ? ANKLE PUMPS helps prevent a blood clot, pump swelling back to you heart  ? Adjust your immobilized foot/ankle to relieve pressure on your heel  ? Do not try to wiggle your toes    Special Care Instructions:     ? DO NOT CHANGE OR ALTER THE DRESSING  ? Keep your dressing(s) dry;    ? Sponge bath or wrap seal your foot (with shoe on) for shower  ? It is normal to have some incisional drainage  ? It is not unusual to have some \"numbness\" in foot and ankle following surgery  ? Smoking should be avoided.  It will interfere in your healing.  ? Check pins daily:  ? Do not push pin in if it comes out  ? Do not pull pin out if it goes in          Report to your " Doctor if any of the following occur:  ? Elevated temperature, 101o or higher  ? Increased pain unrelieved by pain medication and/or elevation  ? Tight/loose/wet dressing  ? Increased swelling  ? Calf pain  ? Pin drainage  ? Pin migration    Pin out over 1  from tip of toe to tip of white cap    White cap comes off    White cap is pushing on tip of toe (no metal showing)  ? For any shortness of breath, DIAL 911, go to the Emergency Room      Medications:  ? Take all of the following medications with food.    ? Aspirin/Ecotrin 325 mg.:  1 tab 1x/day  ? This is for blood clot prevention  ? If you have sensitive stomach, Ecotrin can be less irritating  ? If you experience any unusual bruising or bleeding or ringing in the ears, stop this medication and call us  ? Oxycodone  1-2 every 3-4 hours as needed for pain  ? You may take 1 tablet with plain Tylenol or Ibuprofen for less severe pain or to decrease nausea/mental effect  ? Ibuprofen 1 every 6 hours as needed for inflammatory pain        Your Next Appointment:    ? Appt. scheduled for Wed 5/24/17 @ 1:30 pm        Direct Phone 9:00am to 5:00pm [phone]    After Hours/24 Hour On Call [phone]

## 2017-05-12 NOTE — PLAN OF CARE
Problem: Goal Outcome Summary  Goal: Goal Outcome Summary  Outcome: Adequate for Discharge Date Met:  05/12/17  VSS. A/O. Pain controlled with Toradol and Oxycodone. Ambulated in [facility] SBA with walker. DC instructions reviewed with patient and wife. DC scripts given to patient and wife and reviewed including side effects/administration.  All questions answered. Patient verbalizes understanding of DC care.

## 2017-05-12 NOTE — PLAN OF CARE
Problem: Goal Outcome Summary  Goal: Goal Outcome Summary  Outcome: Improving  A&O. VSS. CMS intact. Pain reduced with PRN tylenol, Oxycodone, scheduled toradol. Voiding 100 ml/void. Pt states as baseline. PVR for 199ml.  Dressing CDI. Up with 1 and crutches. No other complaints. Plan to discharge today.

## 2017-05-12 NOTE — PROGRESS NOTES
Pod 1    Date of Service: May 12, 2017    Afeb, n/v intact  Reviewed findings at surg  Plan:  home, instructed

## 2017-05-12 NOTE — PLAN OF CARE
Problem: Discharge Planning  Goal: Discharge Planning (Adult, OB, Behavioral, Peds)  Outcome: Improving  A&O, anxious at times. VSS on RA. Pain managed with oxycodone, tylenol and toradol. CMS intact. RLE elevated on foam lift. Tolerating regular diet. Unable to void, bladder scanned at 1630 for 766 mL. Pt educated on urinary retention and the need to straight cath. Pt adamant he did not want straight cath and requested additional time to try to void. 1810 pt straight cathed for 900 mL. Will continue to monitor.     2300- able to void 25 mL, PVR- 294 mL. Pt requesting additional time to void.

## 2017-05-12 NOTE — OP NOTE
DATE OF PROCEDURE: 05/11/2017       PREOPERATIVE DIAGNOSES:     1.  Traumatic fracture-dislocation, metatarsal heads 2, 3, 4 and 5, right foot.   2.  Severe fixed hallux valgus deformity.   3.  Unstable fixed right second clawtoe.       POSTOPERATIVE DIAGNOSES:     1.  Traumatic fracture-dislocation, metatarsal heads 2, 3, 4 and 5, right foot.   2.  Severe fixed hallux valgus deformity.   3.  Unstable fixed right second clawtoe.        PROCEDURES:   1.  Right first metatarsophalangeal arthrodesis.   2.  Remberto metatarsal head resection arthroplasty of lesser toes 2, 3, 4 and 5.   3.  DuVries resection arthroplasty, right second toe proximal interphalangeal joint.        DESCRIPTION OF PROCEDURE:  After adequate spinal anesthetic was obtained, Meir Richardson's right foot and ankle were prepped and draped in the usual sterile fashion.  Thigh tourniquet was utilized.  Limb was exsanguinated, tourniquet raised.  The patient has had Parkinson's, a severe traumatic injury with complete fracture dislocation of the metatarsal necks 2, 3 and 4 with a fifth MP dislocation.  He had associated prior to injury severe fixed hallux valgus deformity.  Because of the severity of the comminution deformity.  We elected to proceed with more formal forefoot reconstructive surgery.      We directed our attention first to the fixed second clawtoe.  Transverse elliptical skin incision was made over the dorsal aspect of the second toe PIP joint.  It was carried down through the subcutaneous tissue.  An ellipse of skin and extensor tendon were removed.  Collateral ligaments were released.  Distal aspect of the proximal phalanx was brought up through the wound and resected, utilizing the Micro-Aire microsagittal saw.  This provided good decompression of the fixed flexion deformity.  We then turned our attention to the MP fracture dislocations.  Dorsal longitudinal skin incision was made in the second webspace, carried down through the  subcutaneous tissue.  Extensor tendon was identified and tenotomized in lengthening fashion.  MP capsulotomy was performed.  The comminuted metatarsal neck fracture was identified.  The free floating was then carefully shelled from the corresponding soft tissues the distal aspect of the metatarsal was then resected in a direction plantigrade to the plantar surface of the foot.  In identical fashion, the same procedure was performed on the third toe.  We then turned our attention to the fourth and fifth toe, where in identical fashion, the same procedures were performed.  The fifth toe was completely dislocated.  Mildly displaced fifth metatarsal fracture resection arthroplasty was performed.  Wounds were then thoroughly irrigated with antibiotic solution.  Intramedullary K wire was driven antegrade and then retrograde across the interphalangeal joints into the corresponding metatarsal.  Extensor tendons collateral ligaments were repaired at the MP joints and also the second toe PIP joints.  Subcutaneous tissue was closed with interrupted 2-0 Vicryl, skin closed with 3-0 Prolene vertical mattress stitch and staples.      We then turned our attention to the hallux.  A straight medial longitudinal skin incision was made over the first MTP joint, carried down through the subcutaneous tissue.  Capsule was incised in line with the skin incision and subperiosteally stripped off the medial and dorsal aspect of the joint.  Utilizing the [employer] truncated reamer system, the surfaces were prepared for arthrodesis.  Alignment pin was placed in the proximal phalanx.  Truncated reamer #1 and #2 were utilized to prepare the cup.  Excess bone was removed with a rongeur.  We then turned our attention to the first metatarsal, where the alignment guide was set at 5 degrees of valgus, 15 degrees of dorsiflexion.  Alignment pin was placed.  Truncated reamer #3 was utilized to prepare the cone.  Excess bone was removed with a rongeur.   Cup and cone were then found to coapt quite nicely.  With the toe held in the correct rotation, a guide pin from the Ace 4-0 screw set was placed obliquely proximal to distal.  A 40 mm partially-threaded cancellous lag screw was chosen, countersunk and placed.  There was excellent fixation and alignment.  Wound was then thoroughly irrigated with antibiotic solution.  Capsule closed with interrupted 2-0 Vicryl, skin closed with 3-0 Prolene vertical mattress stitch.  Marcaine 0.25% was instilled along the incision line.  Sterile Daniel dressing was applied.  Tourniquet was released after 108 minutes, and with good capillary refill, the patient was taken to the recovery room in good condition.         JOLLY BRIGGS MD             D: 2017 10:33   T: 2017 11:46   MT: [id number]      Name:     DUARTE TENA   MRN:      [id number]        Account:        [id number]   :      1950           Procedure Date: 2017      Document: [id number]       cc: Jolly Briggs MD

## 2021-01-15 ENCOUNTER — HOSPITAL ENCOUNTER (OUTPATIENT)
Dept: CT IMAGING | Facility: CLINIC | Age: 71
Discharge: HOME | End: 2021-01-15
Attending: UROLOGY | Admitting: UROLOGY
Payer: COMMERCIAL

## 2021-01-15 ENCOUNTER — HOSPITAL ENCOUNTER (OUTPATIENT)
Dept: NUCLEAR MEDICINE | Facility: CLINIC | Age: 71
Setting detail: NUCLEAR MEDICINE
End: 2021-01-15
Attending: UROLOGY
Payer: COMMERCIAL

## 2021-01-15 DIAGNOSIS — C61 PROSTATE CANCER (H): ICD-10-CM

## 2021-01-15 PROCEDURE — 74177 CT ABD & PELVIS W/CONTRAST: CPT

## 2021-01-15 PROCEDURE — 250N000009 HC RX 250: Performed by: UROLOGY

## 2021-01-15 PROCEDURE — A9503 TC99M MEDRONATE: HCPCS | Performed by: UROLOGY

## 2021-01-15 PROCEDURE — 78306 BONE IMAGING WHOLE BODY: CPT

## 2021-01-15 PROCEDURE — 343N000001 HC RX 343: Performed by: UROLOGY

## 2021-01-15 PROCEDURE — 250N000011 HC RX IP 250 OP 636: Performed by: UROLOGY

## 2021-01-15 RX ORDER — TC 99M MEDRONATE 20 MG/10ML
25 INJECTION, POWDER, LYOPHILIZED, FOR SOLUTION INTRAVENOUS ONCE
Status: COMPLETED | OUTPATIENT
Start: 2021-01-15 | End: 2021-01-15

## 2021-01-15 RX ORDER — IOPAMIDOL 755 MG/ML
83 INJECTION, SOLUTION INTRAVASCULAR ONCE
Status: COMPLETED | OUTPATIENT
Start: 2021-01-15 | End: 2021-01-15

## 2021-01-15 RX ADMIN — TC 99M MEDRONATE 28.5 MCI.: 20 INJECTION, POWDER, LYOPHILIZED, FOR SOLUTION INTRAVENOUS at 10:30

## 2021-01-15 RX ADMIN — IOPAMIDOL 83 ML: 755 INJECTION, SOLUTION INTRAVENOUS at 10:41

## 2021-01-15 RX ADMIN — SODIUM CHLORIDE 64 ML: 9 INJECTION, SOLUTION INTRAVENOUS at 10:41

## 2021-02-28 ENCOUNTER — HEALTH MAINTENANCE LETTER (OUTPATIENT)
Age: 71
End: 2021-02-28

## 2021-10-03 ENCOUNTER — HEALTH MAINTENANCE LETTER (OUTPATIENT)
Age: 71
End: 2021-10-03

## 2022-01-01 ENCOUNTER — HOME INFUSION (PRE-WILLOW HOME INFUSION) (OUTPATIENT)
Dept: PHARMACY | Facility: CLINIC | Age: 72
End: 2022-01-01

## 2022-01-01 ENCOUNTER — LAB REQUISITION (OUTPATIENT)
Dept: LAB | Facility: CLINIC | Age: 72
End: 2022-01-01

## 2022-01-01 ENCOUNTER — APPOINTMENT (OUTPATIENT)
Dept: PHYSICAL THERAPY | Facility: CLINIC | Age: 72
DRG: 522 | End: 2022-01-01
Attending: ORTHOPAEDIC SURGERY
Payer: MEDICARE

## 2022-01-01 ENCOUNTER — PATIENT OUTREACH (OUTPATIENT)
Dept: CARE COORDINATION | Facility: CLINIC | Age: 72
End: 2022-01-01
Payer: MEDICARE

## 2022-01-01 ENCOUNTER — HOME INFUSION (PRE-WILLOW HOME INFUSION) (OUTPATIENT)
Dept: PHARMACY | Facility: CLINIC | Age: 72
End: 2022-01-01
Payer: MEDICARE

## 2022-01-01 ENCOUNTER — ANESTHESIA EVENT (OUTPATIENT)
Dept: SURGERY | Facility: CLINIC | Age: 72
DRG: 522 | End: 2022-01-01
Payer: MEDICARE

## 2022-01-01 ENCOUNTER — APPOINTMENT (OUTPATIENT)
Dept: OCCUPATIONAL THERAPY | Facility: CLINIC | Age: 72
DRG: 481 | End: 2022-01-01
Attending: INTERNAL MEDICINE
Payer: MEDICARE

## 2022-01-01 ENCOUNTER — APPOINTMENT (OUTPATIENT)
Dept: GENERAL RADIOLOGY | Facility: CLINIC | Age: 72
DRG: 522 | End: 2022-01-01
Attending: ORTHOPAEDIC SURGERY
Payer: MEDICARE

## 2022-01-01 ENCOUNTER — HEALTH MAINTENANCE LETTER (OUTPATIENT)
Age: 72
End: 2022-01-01

## 2022-01-01 ENCOUNTER — APPOINTMENT (OUTPATIENT)
Dept: GENERAL RADIOLOGY | Facility: CLINIC | Age: 72
DRG: 481 | End: 2022-01-01
Attending: INTERNAL MEDICINE
Payer: MEDICARE

## 2022-01-01 ENCOUNTER — TELEPHONE (OUTPATIENT)
Dept: NEUROLOGY | Facility: CLINIC | Age: 72
End: 2022-01-01
Payer: MEDICARE

## 2022-01-01 ENCOUNTER — TRANSITIONAL CARE UNIT VISIT (OUTPATIENT)
Dept: GERIATRICS | Facility: CLINIC | Age: 72
End: 2022-01-01
Payer: MEDICARE

## 2022-01-01 ENCOUNTER — APPOINTMENT (OUTPATIENT)
Dept: PHYSICAL THERAPY | Facility: CLINIC | Age: 72
DRG: 481 | End: 2022-01-01
Attending: INTERNAL MEDICINE
Payer: MEDICARE

## 2022-01-01 ENCOUNTER — DOCUMENTATION ONLY (OUTPATIENT)
Dept: OTHER | Facility: CLINIC | Age: 72
End: 2022-01-01

## 2022-01-01 ENCOUNTER — ANESTHESIA (OUTPATIENT)
Dept: SURGERY | Facility: CLINIC | Age: 72
DRG: 522 | End: 2022-01-01
Payer: MEDICARE

## 2022-01-01 ENCOUNTER — HOSPITAL ENCOUNTER (EMERGENCY)
Facility: CLINIC | Age: 72
Discharge: HOME | End: 2022-05-13
Attending: PHYSICIAN ASSISTANT | Admitting: PHYSICIAN ASSISTANT
Payer: MEDICARE

## 2022-01-01 ENCOUNTER — PATIENT OUTREACH (OUTPATIENT)
Dept: CARE COORDINATION | Facility: CLINIC | Age: 72
End: 2022-01-01

## 2022-01-01 ENCOUNTER — LAB (OUTPATIENT)
Dept: LAB | Facility: CLINIC | Age: 72
End: 2022-01-01
Attending: ORTHOPAEDIC SURGERY
Payer: MEDICARE

## 2022-01-01 ENCOUNTER — HOSPITAL ENCOUNTER (INPATIENT)
Facility: CLINIC | Age: 72
LOS: 4 days | Discharge: NURSING, REHAB OR LONG-TERM CARE | DRG: 522 | End: 2022-03-28
Attending: ORTHOPAEDIC SURGERY | Admitting: ORTHOPAEDIC SURGERY
Payer: MEDICARE

## 2022-01-01 ENCOUNTER — ANESTHESIA EVENT (OUTPATIENT)
Dept: SURGERY | Facility: CLINIC | Age: 72
DRG: 481 | End: 2022-01-01
Payer: MEDICARE

## 2022-01-01 ENCOUNTER — DOCUMENTATION ONLY (OUTPATIENT)
Dept: OTHER | Facility: CLINIC | Age: 72
End: 2022-01-01
Payer: MEDICARE

## 2022-01-01 ENCOUNTER — HOSPITAL ENCOUNTER (INPATIENT)
Facility: CLINIC | Age: 72
LOS: 2 days | Discharge: HOME HEALTH | DRG: 481 | End: 2022-02-23
Attending: INTERNAL MEDICINE | Admitting: INTERNAL MEDICINE
Payer: MEDICARE

## 2022-01-01 ENCOUNTER — DISCHARGE SUMMARY NURSING HOME (OUTPATIENT)
Dept: GERIATRICS | Facility: CLINIC | Age: 72
End: 2022-01-01
Payer: MEDICARE

## 2022-01-01 ENCOUNTER — ANESTHESIA (OUTPATIENT)
Dept: SURGERY | Facility: CLINIC | Age: 72
DRG: 481 | End: 2022-01-01
Payer: MEDICARE

## 2022-01-01 VITALS
HEART RATE: 89 BPM | HEIGHT: 72 IN | SYSTOLIC BLOOD PRESSURE: 123 MMHG | DIASTOLIC BLOOD PRESSURE: 81 MMHG | WEIGHT: 145 LBS | RESPIRATION RATE: 16 BRPM | BODY MASS INDEX: 19.64 KG/M2 | TEMPERATURE: 98 F | OXYGEN SATURATION: 97 %

## 2022-01-01 VITALS
DIASTOLIC BLOOD PRESSURE: 71 MMHG | OXYGEN SATURATION: 97 % | RESPIRATION RATE: 16 BRPM | HEART RATE: 90 BPM | TEMPERATURE: 97.2 F | SYSTOLIC BLOOD PRESSURE: 109 MMHG

## 2022-01-01 VITALS
DIASTOLIC BLOOD PRESSURE: 81 MMHG | SYSTOLIC BLOOD PRESSURE: 121 MMHG | HEART RATE: 100 BPM | RESPIRATION RATE: 16 BRPM | TEMPERATURE: 98.6 F | OXYGEN SATURATION: 95 %

## 2022-01-01 VITALS
DIASTOLIC BLOOD PRESSURE: 73 MMHG | TEMPERATURE: 98.6 F | OXYGEN SATURATION: 97 % | RESPIRATION RATE: 18 BRPM | SYSTOLIC BLOOD PRESSURE: 111 MMHG | HEART RATE: 107 BPM

## 2022-01-01 VITALS
WEIGHT: 150.4 LBS | TEMPERATURE: 97.8 F | RESPIRATION RATE: 18 BRPM | DIASTOLIC BLOOD PRESSURE: 87 MMHG | HEIGHT: 72 IN | OXYGEN SATURATION: 98 % | BODY MASS INDEX: 20.37 KG/M2 | HEART RATE: 91 BPM | SYSTOLIC BLOOD PRESSURE: 133 MMHG

## 2022-01-01 VITALS
SYSTOLIC BLOOD PRESSURE: 100 MMHG | DIASTOLIC BLOOD PRESSURE: 69 MMHG | RESPIRATION RATE: 16 BRPM | TEMPERATURE: 97.1 F | BODY MASS INDEX: 20.13 KG/M2 | OXYGEN SATURATION: 96 % | WEIGHT: 148.4 LBS | HEART RATE: 86 BPM

## 2022-01-01 DIAGNOSIS — F41.9 ANXIETY: ICD-10-CM

## 2022-01-01 DIAGNOSIS — D64.9 ANEMIA, UNSPECIFIED TYPE: ICD-10-CM

## 2022-01-01 DIAGNOSIS — D64.9 ANEMIA, UNSPECIFIED: ICD-10-CM

## 2022-01-01 DIAGNOSIS — Z11.1 ENCOUNTER FOR SCREENING FOR RESPIRATORY TUBERCULOSIS: ICD-10-CM

## 2022-01-01 DIAGNOSIS — U07.1 COVID-19: ICD-10-CM

## 2022-01-01 DIAGNOSIS — R79.89 ELEVATED SERUM CREATININE: ICD-10-CM

## 2022-01-01 DIAGNOSIS — Z00.01 ENCOUNTER FOR GENERAL ADULT MEDICAL EXAMINATION WITH ABNORMAL FINDINGS: ICD-10-CM

## 2022-01-01 DIAGNOSIS — N18.31 STAGE 3A CHRONIC KIDNEY DISEASE (H): ICD-10-CM

## 2022-01-01 DIAGNOSIS — D64.9 CHRONIC ANEMIA: ICD-10-CM

## 2022-01-01 DIAGNOSIS — G43.419 INTRACTABLE HEMIPLEGIC MIGRAINE WITHOUT STATUS MIGRAINOSUS: Primary | ICD-10-CM

## 2022-01-01 DIAGNOSIS — Z71.89 OTHER SPECIFIED COUNSELING: ICD-10-CM

## 2022-01-01 DIAGNOSIS — R53.81 PHYSICAL DECONDITIONING: ICD-10-CM

## 2022-01-01 DIAGNOSIS — S72.001A CLOSED DISPLACED FRACTURE OF RIGHT FEMORAL NECK (H): ICD-10-CM

## 2022-01-01 DIAGNOSIS — D64.9 ACUTE ON CHRONIC ANEMIA: ICD-10-CM

## 2022-01-01 DIAGNOSIS — Z01.812 ENCOUNTER FOR PREOPERATIVE SCREENING LABORATORY TESTING FOR COVID-19 VIRUS: ICD-10-CM

## 2022-01-01 DIAGNOSIS — F32.A DEPRESSION, UNSPECIFIED DEPRESSION TYPE: ICD-10-CM

## 2022-01-01 DIAGNOSIS — R33.9 URINARY RETENTION: ICD-10-CM

## 2022-01-01 DIAGNOSIS — I95.1 ORTHOSTATIC HYPOTENSION: ICD-10-CM

## 2022-01-01 DIAGNOSIS — Z85.46 HISTORY OF PROSTATE CANCER: ICD-10-CM

## 2022-01-01 DIAGNOSIS — Z71.89 ADVANCED DIRECTIVES, COUNSELING/DISCUSSION: ICD-10-CM

## 2022-01-01 DIAGNOSIS — S72.001A CLOSED FRACTURE OF NECK OF RIGHT FEMUR, INITIAL ENCOUNTER (H): ICD-10-CM

## 2022-01-01 DIAGNOSIS — D62 ACUTE BLOOD LOSS ANEMIA: ICD-10-CM

## 2022-01-01 DIAGNOSIS — S72.001A CLOSED DISPLACED FRACTURE OF RIGHT FEMORAL NECK (H): Primary | ICD-10-CM

## 2022-01-01 DIAGNOSIS — G20.A1 PARKINSON'S DISEASE (H): ICD-10-CM

## 2022-01-01 DIAGNOSIS — G43.009 MIGRAINE WITHOUT AURA AND WITHOUT STATUS MIGRAINOSUS, NOT INTRACTABLE: ICD-10-CM

## 2022-01-01 DIAGNOSIS — I47.19 PAT (PAROXYSMAL ATRIAL TACHYCARDIA) (H): ICD-10-CM

## 2022-01-01 DIAGNOSIS — N18.30 STAGE 3 CHRONIC KIDNEY DISEASE, UNSPECIFIED WHETHER STAGE 3A OR 3B CKD (H): ICD-10-CM

## 2022-01-01 DIAGNOSIS — N18.9 CHRONIC KIDNEY DISEASE, UNSPECIFIED: ICD-10-CM

## 2022-01-01 DIAGNOSIS — Z96.641 S/P TOTAL RIGHT HIP ARTHROPLASTY: ICD-10-CM

## 2022-01-01 DIAGNOSIS — Z11.52 ENCOUNTER FOR PREOPERATIVE SCREENING LABORATORY TESTING FOR COVID-19 VIRUS: ICD-10-CM

## 2022-01-01 DIAGNOSIS — K59.01 SLOW TRANSIT CONSTIPATION: ICD-10-CM

## 2022-01-01 DIAGNOSIS — E87.1 HYPONATREMIA: ICD-10-CM

## 2022-01-01 DIAGNOSIS — M35.2 BEHCET'S DISEASE (H): ICD-10-CM

## 2022-01-01 LAB
ABO/RH(D): NORMAL
ABO/RH(D): NORMAL
ALBUMIN UR-MCNC: 10 MG/DL
ALBUMIN UR-MCNC: 30 MG/DL
ANION GAP SERPL CALCULATED.3IONS-SCNC: 2 MMOL/L (ref 3–14)
ANION GAP SERPL CALCULATED.3IONS-SCNC: 2 MMOL/L (ref 3–14)
ANION GAP SERPL CALCULATED.3IONS-SCNC: 3 MMOL/L (ref 3–14)
ANION GAP SERPL CALCULATED.3IONS-SCNC: 3 MMOL/L (ref 3–14)
ANION GAP SERPL CALCULATED.3IONS-SCNC: 4 MMOL/L (ref 3–14)
ANION GAP SERPL CALCULATED.3IONS-SCNC: 4 MMOL/L (ref 3–14)
ANION GAP SERPL CALCULATED.3IONS-SCNC: 7 MMOL/L (ref 3–14)
ANTIBODY SCREEN: NEGATIVE
ANTIBODY SCREEN: NEGATIVE
APPEARANCE UR: ABNORMAL
APPEARANCE UR: CLEAR
ATRIAL RATE - MUSE: 94 BPM
BACTERIA #/AREA URNS HPF: ABNORMAL /HPF
BACTERIA UR CULT: NO GROWTH
BASOPHILS # BLD AUTO: 0 10E3/UL (ref 0–0.2)
BASOPHILS NFR BLD AUTO: 1 %
BILIRUB UR QL STRIP: NEGATIVE
BILIRUB UR QL STRIP: NEGATIVE
BUN SERPL-MCNC: 29 MG/DL (ref 7–30)
BUN SERPL-MCNC: 30 MG/DL (ref 7–30)
BUN SERPL-MCNC: 31 MG/DL (ref 7–30)
BUN SERPL-MCNC: 31 MG/DL (ref 7–30)
BUN SERPL-MCNC: 32 MG/DL (ref 7–30)
BUN SERPL-MCNC: 34 MG/DL (ref 7–30)
BUN SERPL-MCNC: 37 MG/DL (ref 7–30)
CALCIUM SERPL-MCNC: 8.9 MG/DL (ref 8.5–10.1)
CALCIUM SERPL-MCNC: 9 MG/DL (ref 8.5–10.1)
CALCIUM SERPL-MCNC: 9 MG/DL (ref 8.5–10.1)
CALCIUM SERPL-MCNC: 9.2 MG/DL (ref 8.5–10.1)
CALCIUM SERPL-MCNC: 9.3 MG/DL (ref 8.5–10.1)
CALCIUM SERPL-MCNC: 9.6 MG/DL (ref 8.5–10.1)
CALCIUM SERPL-MCNC: 9.9 MG/DL (ref 8.5–10.1)
CHLORIDE BLD-SCNC: 101 MMOL/L (ref 94–109)
CHLORIDE BLD-SCNC: 101 MMOL/L (ref 94–109)
CHLORIDE BLD-SCNC: 103 MMOL/L (ref 94–109)
CHLORIDE BLD-SCNC: 103 MMOL/L (ref 94–109)
CHLORIDE BLD-SCNC: 104 MMOL/L (ref 94–109)
CHLORIDE BLD-SCNC: 104 MMOL/L (ref 94–109)
CHLORIDE BLD-SCNC: 105 MMOL/L (ref 94–109)
CO2 SERPL-SCNC: 24 MMOL/L (ref 20–32)
CO2 SERPL-SCNC: 27 MMOL/L (ref 20–32)
CO2 SERPL-SCNC: 29 MMOL/L (ref 20–32)
CO2 SERPL-SCNC: 29 MMOL/L (ref 20–32)
CO2 SERPL-SCNC: 30 MMOL/L (ref 20–32)
CO2 SERPL-SCNC: 31 MMOL/L (ref 20–32)
CO2 SERPL-SCNC: 31 MMOL/L (ref 20–32)
COLOR UR AUTO: ABNORMAL
COLOR UR AUTO: ABNORMAL
CREAT SERPL-MCNC: 1.34 MG/DL (ref 0.52–1.25)
CREAT SERPL-MCNC: 1.34 MG/DL (ref 0.66–1.25)
CREAT SERPL-MCNC: 1.37 MG/DL (ref 0.52–1.25)
CREAT SERPL-MCNC: 1.38 MG/DL (ref 0.52–1.25)
CREAT SERPL-MCNC: 1.43 MG/DL (ref 0.52–1.25)
CREAT SERPL-MCNC: 1.44 MG/DL (ref 0.52–1.25)
CREAT SERPL-MCNC: 1.51 MG/DL (ref 0.52–1.25)
DEPRECATED CALCIDIOL+CALCIFEROL SERPL-MC: 24 UG/L (ref 20–75)
DEPRECATED CALCIDIOL+CALCIFEROL SERPL-MC: <35 UG/L (ref 20–75)
DIASTOLIC BLOOD PRESSURE - MUSE: NORMAL MMHG
EOSINOPHIL # BLD AUTO: 0.3 10E3/UL (ref 0–0.7)
EOSINOPHIL NFR BLD AUTO: 5 %
ERYTHROCYTE [DISTWIDTH] IN BLOOD BY AUTOMATED COUNT: 12.6 % (ref 10–15)
ERYTHROCYTE [DISTWIDTH] IN BLOOD BY AUTOMATED COUNT: 13 % (ref 10–15)
ERYTHROCYTE [DISTWIDTH] IN BLOOD BY AUTOMATED COUNT: 13 % (ref 10–15)
ERYTHROCYTE [DISTWIDTH] IN BLOOD BY AUTOMATED COUNT: 13.2 % (ref 10–15)
GAMMA INTERFERON BACKGROUND BLD IA-ACNC: 0.14 IU/ML
GFR SERPL CREATININE-BSD FRML MDRD: 49 ML/MIN/1.73M2
GFR SERPL CREATININE-BSD FRML MDRD: 52 ML/MIN/1.73M2
GFR SERPL CREATININE-BSD FRML MDRD: 53 ML/MIN/1.73M2
GFR SERPL CREATININE-BSD FRML MDRD: 55 ML/MIN/1.73M2
GFR SERPL CREATININE-BSD FRML MDRD: 55 ML/MIN/1.73M2
GFR SERPL CREATININE-BSD FRML MDRD: 57 ML/MIN/1.73M2
GFR SERPL CREATININE-BSD FRML MDRD: 57 ML/MIN/1.73M2
GLUCOSE BLD-MCNC: 102 MG/DL (ref 70–99)
GLUCOSE BLD-MCNC: 104 MG/DL (ref 70–99)
GLUCOSE BLD-MCNC: 110 MG/DL (ref 70–99)
GLUCOSE BLD-MCNC: 87 MG/DL (ref 70–99)
GLUCOSE BLD-MCNC: 93 MG/DL (ref 70–99)
GLUCOSE BLD-MCNC: 96 MG/DL (ref 70–99)
GLUCOSE BLD-MCNC: 96 MG/DL (ref 70–99)
GLUCOSE BLDC GLUCOMTR-MCNC: 100 MG/DL (ref 70–99)
GLUCOSE UR STRIP-MCNC: NEGATIVE MG/DL
GLUCOSE UR STRIP-MCNC: NEGATIVE MG/DL
HCT VFR BLD AUTO: 23.2 % (ref 40–53)
HCT VFR BLD AUTO: 24.1 % (ref 35–53)
HCT VFR BLD AUTO: 31.6 % (ref 35–53)
HCT VFR BLD AUTO: 32.3 % (ref 35–53)
HGB BLD-MCNC: 10 G/DL (ref 11.7–17.7)
HGB BLD-MCNC: 10.2 G/DL (ref 11.7–17.7)
HGB BLD-MCNC: 10.3 G/DL (ref 11.7–17.7)
HGB BLD-MCNC: 7.4 G/DL (ref 13.3–17.7)
HGB BLD-MCNC: 7.8 G/DL (ref 11.7–17.7)
HGB BLD-MCNC: 7.9 G/DL (ref 11.7–17.7)
HGB BLD-MCNC: 9.8 G/DL (ref 11.7–17.7)
HGB UR QL STRIP: ABNORMAL
HGB UR QL STRIP: ABNORMAL
HYALINE CASTS: 4 /LPF
IMM GRANULOCYTES # BLD: 0 10E3/UL
IMM GRANULOCYTES NFR BLD: 1 %
INR PPP: 1.04 (ref 0.85–1.15)
INTERPRETATION ECG - MUSE: NORMAL
KETONES UR STRIP-MCNC: NEGATIVE MG/DL
KETONES UR STRIP-MCNC: NEGATIVE MG/DL
LEUKOCYTE ESTERASE UR QL STRIP: ABNORMAL
LEUKOCYTE ESTERASE UR QL STRIP: ABNORMAL
LYMPHOCYTES # BLD AUTO: 0.8 10E3/UL (ref 0.8–5.3)
LYMPHOCYTES NFR BLD AUTO: 16 %
M TB IFN-G BLD-IMP: NEGATIVE
M TB IFN-G CD4+ BCKGRND COR BLD-ACNC: 1.26 IU/ML
MCH RBC QN AUTO: 31 PG (ref 26.5–33)
MCH RBC QN AUTO: 31.8 PG (ref 26.5–33)
MCH RBC QN AUTO: 32.2 PG (ref 26.5–33)
MCH RBC QN AUTO: 32.5 PG (ref 26.5–33)
MCHC RBC AUTO-ENTMCNC: 31.6 G/DL (ref 31.5–36.5)
MCHC RBC AUTO-ENTMCNC: 31.9 G/DL (ref 31.5–36.5)
MCHC RBC AUTO-ENTMCNC: 32.4 G/DL (ref 31.5–36.5)
MCHC RBC AUTO-ENTMCNC: 32.6 G/DL (ref 31.5–36.5)
MCV RBC AUTO: 100 FL (ref 78–100)
MCV RBC AUTO: 98 FL (ref 78–100)
MITOGEN IGNF BCKGRD COR BLD-ACNC: -0.01 IU/ML
MITOGEN IGNF BCKGRD COR BLD-ACNC: 0.01 IU/ML
MONOCYTES # BLD AUTO: 0.4 10E3/UL (ref 0–1.3)
MONOCYTES NFR BLD AUTO: 8 %
MUCOUS THREADS #/AREA URNS LPF: PRESENT /LPF
MUCOUS THREADS #/AREA URNS LPF: PRESENT /LPF
NEUTROPHILS # BLD AUTO: 3.6 10E3/UL (ref 1.6–8.3)
NEUTROPHILS NFR BLD AUTO: 69 %
NITRATE UR QL: NEGATIVE
NITRATE UR QL: NEGATIVE
NRBC # BLD AUTO: 0 10E3/UL
NRBC BLD AUTO-RTO: 0 /100
P AXIS - MUSE: 65 DEGREES
PH UR STRIP: 6 [PH] (ref 5–7)
PH UR STRIP: 6 [PH] (ref 5–7)
PLATELET # BLD AUTO: 180 10E3/UL (ref 150–450)
PLATELET # BLD AUTO: 193 10E3/UL (ref 150–450)
PLATELET # BLD AUTO: 193 10E3/UL (ref 150–450)
PLATELET # BLD AUTO: 255 10E3/UL (ref 150–450)
POTASSIUM BLD-SCNC: 3.8 MMOL/L (ref 3.4–5.3)
POTASSIUM BLD-SCNC: 3.9 MMOL/L (ref 3.4–5.3)
POTASSIUM BLD-SCNC: 3.9 MMOL/L (ref 3.4–5.3)
POTASSIUM BLD-SCNC: 4.1 MMOL/L (ref 3.4–5.3)
POTASSIUM BLD-SCNC: 4.2 MMOL/L (ref 3.4–5.3)
POTASSIUM BLD-SCNC: 4.2 MMOL/L (ref 3.4–5.3)
POTASSIUM BLD-SCNC: 4.4 MMOL/L (ref 3.4–5.3)
PR INTERVAL - MUSE: 160 MS
QRS DURATION - MUSE: 86 MS
QT - MUSE: 382 MS
QTC - MUSE: 477 MS
QUANTIFERON MITOGEN: 1.4 IU/ML
QUANTIFERON NIL TUBE: 0.14 IU/ML
QUANTIFERON TB1 TUBE: 0.15 IU/ML
QUANTIFERON TB2 TUBE: 0.13
R AXIS - MUSE: 67 DEGREES
RBC # BLD AUTO: 2.33 10E6/UL (ref 4.4–5.9)
RBC # BLD AUTO: 2.42 10E6/UL (ref 3.8–5.9)
RBC # BLD AUTO: 3.17 10E6/UL (ref 3.8–5.9)
RBC # BLD AUTO: 3.29 10E6/UL (ref 3.8–5.9)
RBC URINE: 28 /HPF
RBC URINE: 4 /HPF
SARS-COV-2 RNA RESP QL NAA+PROBE: NEGATIVE
SODIUM SERPL-SCNC: 132 MMOL/L (ref 133–144)
SODIUM SERPL-SCNC: 135 MMOL/L (ref 133–144)
SODIUM SERPL-SCNC: 136 MMOL/L (ref 133–144)
SODIUM SERPL-SCNC: 137 MMOL/L (ref 133–144)
SODIUM SERPL-SCNC: 137 MMOL/L (ref 133–144)
SP GR UR STRIP: 1.01 (ref 1–1.03)
SP GR UR STRIP: 1.02 (ref 1–1.03)
SPECIMEN EXPIRATION DATE: NORMAL
SPECIMEN EXPIRATION DATE: NORMAL
SYSTOLIC BLOOD PRESSURE - MUSE: NORMAL MMHG
T AXIS - MUSE: 65 DEGREES
UROBILINOGEN UR STRIP-MCNC: NORMAL MG/DL
UROBILINOGEN UR STRIP-MCNC: NORMAL MG/DL
VENTRICULAR RATE- MUSE: 94 BPM
VITAMIN D2 SERPL-MCNC: <5 UG/L
VITAMIN D3 SERPL-MCNC: 30 UG/L
WBC # BLD AUTO: 3.1 10E3/UL (ref 4–11)
WBC # BLD AUTO: 3.4 10E3/UL (ref 4–11)
WBC # BLD AUTO: 4.2 10E3/UL (ref 4–11)
WBC # BLD AUTO: 5.1 10E3/UL (ref 4–11)
WBC CLUMPS #/AREA URNS HPF: PRESENT /HPF
WBC URINE: 16 /HPF
WBC URINE: >182 /HPF

## 2022-01-01 PROCEDURE — 258N000003 HC RX IP 258 OP 636: Performed by: ANESTHESIOLOGY

## 2022-01-01 PROCEDURE — 258N000003 HC RX IP 258 OP 636: Performed by: PHYSICIAN ASSISTANT

## 2022-01-01 PROCEDURE — 36415 COLL VENOUS BLD VENIPUNCTURE: CPT | Performed by: NURSE PRACTITIONER

## 2022-01-01 PROCEDURE — 120N000001 HC R&B MED SURG/OB

## 2022-01-01 PROCEDURE — 80048 BASIC METABOLIC PNL TOTAL CA: CPT | Performed by: PHYSICIAN ASSISTANT

## 2022-01-01 PROCEDURE — 258N000003 HC RX IP 258 OP 636: Performed by: ORTHOPAEDIC SURGERY

## 2022-01-01 PROCEDURE — 36415 COLL VENOUS BLD VENIPUNCTURE: CPT | Performed by: ORTHOPAEDIC SURGERY

## 2022-01-01 PROCEDURE — 250N000013 HC RX MED GY IP 250 OP 250 PS 637: Performed by: PHYSICIAN ASSISTANT

## 2022-01-01 PROCEDURE — U0005 INFEC AGEN DETEC AMPLI PROBE: HCPCS | Performed by: NURSE PRACTITIONER

## 2022-01-01 PROCEDURE — 250N000013 HC RX MED GY IP 250 OP 250 PS 637: Performed by: ORTHOPAEDIC SURGERY

## 2022-01-01 PROCEDURE — 258N000003 HC RX IP 258 OP 636: Performed by: NURSE ANESTHETIST, CERTIFIED REGISTERED

## 2022-01-01 PROCEDURE — 85610 PROTHROMBIN TIME: CPT | Performed by: PHYSICIAN ASSISTANT

## 2022-01-01 PROCEDURE — 97530 THERAPEUTIC ACTIVITIES: CPT | Mod: GP | Performed by: PHYSICAL THERAPIST

## 2022-01-01 PROCEDURE — 250N000011 HC RX IP 250 OP 636: Performed by: NURSE ANESTHETIST, CERTIFIED REGISTERED

## 2022-01-01 PROCEDURE — 360N000078 HC SURGERY LEVEL 5, PER MIN: Performed by: ORTHOPAEDIC SURGERY

## 2022-01-01 PROCEDURE — 85018 HEMOGLOBIN: CPT | Performed by: INTERNAL MEDICINE

## 2022-01-01 PROCEDURE — 97110 THERAPEUTIC EXERCISES: CPT | Mod: GP | Performed by: PHYSICAL THERAPIST

## 2022-01-01 PROCEDURE — 0SR9049 REPLACEMENT OF RIGHT HIP JOINT WITH CERAMIC ON POLYETHYLENE SYNTHETIC SUBSTITUTE, CEMENTED, OPEN APPROACH: ICD-10-PCS | Performed by: ORTHOPAEDIC SURGERY

## 2022-01-01 PROCEDURE — 250N000011 HC RX IP 250 OP 636: Performed by: PHYSICIAN ASSISTANT

## 2022-01-01 PROCEDURE — 999N000141 HC STATISTIC PRE-PROCEDURE NURSING ASSESSMENT: Performed by: ORTHOPAEDIC SURGERY

## 2022-01-01 PROCEDURE — 36415 COLL VENOUS BLD VENIPUNCTURE: CPT | Performed by: INTERNAL MEDICINE

## 2022-01-01 PROCEDURE — 250N000009 HC RX 250: Performed by: INTERNAL MEDICINE

## 2022-01-01 PROCEDURE — 36415 COLL VENOUS BLD VENIPUNCTURE: CPT | Performed by: PHYSICIAN ASSISTANT

## 2022-01-01 PROCEDURE — 258N000001 HC RX 258: Performed by: ORTHOPAEDIC SURGERY

## 2022-01-01 PROCEDURE — 97166 OT EVAL MOD COMPLEX 45 MIN: CPT | Mod: GO | Performed by: OCCUPATIONAL THERAPIST

## 2022-01-01 PROCEDURE — 0SB90ZZ EXCISION OF RIGHT HIP JOINT, OPEN APPROACH: ICD-10-PCS | Performed by: ORTHOPAEDIC SURGERY

## 2022-01-01 PROCEDURE — 82310 ASSAY OF CALCIUM: CPT | Performed by: PHYSICIAN ASSISTANT

## 2022-01-01 PROCEDURE — 81001 URINALYSIS AUTO W/SCOPE: CPT | Performed by: INTERNAL MEDICINE

## 2022-01-01 PROCEDURE — 250N000009 HC RX 250: Performed by: ORTHOPAEDIC SURGERY

## 2022-01-01 PROCEDURE — C1713 ANCHOR/SCREW BN/BN,TIS/BN: HCPCS | Performed by: ORTHOPAEDIC SURGERY

## 2022-01-01 PROCEDURE — 99233 SBSQ HOSP IP/OBS HIGH 50: CPT | Performed by: HOSPITALIST

## 2022-01-01 PROCEDURE — 85018 HEMOGLOBIN: CPT | Performed by: PHYSICIAN ASSISTANT

## 2022-01-01 PROCEDURE — 99232 SBSQ HOSP IP/OBS MODERATE 35: CPT | Performed by: INTERNAL MEDICINE

## 2022-01-01 PROCEDURE — 99443 PR PHYSICIAN TELEPHONE EVALUATION 21-30 MIN: CPT | Mod: 95 | Performed by: PSYCHIATRY & NEUROLOGY

## 2022-01-01 PROCEDURE — 250N000011 HC RX IP 250 OP 636: Performed by: ORTHOPAEDIC SURGERY

## 2022-01-01 PROCEDURE — 272N000001 HC OR GENERAL SUPPLY STERILE: Performed by: ORTHOPAEDIC SURGERY

## 2022-01-01 PROCEDURE — 86481 TB AG RESPONSE T-CELL SUSP: CPT | Performed by: NURSE PRACTITIONER

## 2022-01-01 PROCEDURE — 85018 HEMOGLOBIN: CPT | Performed by: ORTHOPAEDIC SURGERY

## 2022-01-01 PROCEDURE — 81001 URINALYSIS AUTO W/SCOPE: CPT | Performed by: PHYSICIAN ASSISTANT

## 2022-01-01 PROCEDURE — 87086 URINE CULTURE/COLONY COUNT: CPT | Performed by: INTERNAL MEDICINE

## 2022-01-01 PROCEDURE — 82306 VITAMIN D 25 HYDROXY: CPT | Performed by: PHYSICIAN ASSISTANT

## 2022-01-01 PROCEDURE — 999N000063 XR PELVIS AD HIP PORTABLE RIGHT 1 VIEW

## 2022-01-01 PROCEDURE — 370N000017 HC ANESTHESIA TECHNICAL FEE, PER MIN: Performed by: ORTHOPAEDIC SURGERY

## 2022-01-01 PROCEDURE — U0003 INFECTIOUS AGENT DETECTION BY NUCLEIC ACID (DNA OR RNA); SEVERE ACUTE RESPIRATORY SYNDROME CORONAVIRUS 2 (SARS-COV-2) (CORONAVIRUS DISEASE [COVID-19]), AMPLIFIED PROBE TECHNIQUE, MAKING USE OF HIGH THROUGHPUT TECHNOLOGIES AS DESCRIBED BY CMS-2020-01-R: HCPCS

## 2022-01-01 PROCEDURE — 85018 HEMOGLOBIN: CPT | Performed by: ANESTHESIOLOGY

## 2022-01-01 PROCEDURE — 250N000025 HC SEVOFLURANE, PER MIN: Performed by: ORTHOPAEDIC SURGERY

## 2022-01-01 PROCEDURE — 85027 COMPLETE CBC AUTOMATED: CPT | Performed by: PHYSICIAN ASSISTANT

## 2022-01-01 PROCEDURE — 97116 GAIT TRAINING THERAPY: CPT | Mod: GP | Performed by: PHYSICAL THERAPIST

## 2022-01-01 PROCEDURE — 85025 COMPLETE CBC W/AUTO DIFF WBC: CPT | Performed by: PHYSICIAN ASSISTANT

## 2022-01-01 PROCEDURE — 99316 NF DSCHRG MGMT 30 MIN+: CPT | Performed by: NURSE PRACTITIONER

## 2022-01-01 PROCEDURE — 710N000009 HC RECOVERY PHASE 1, LEVEL 1, PER MIN: Performed by: ORTHOPAEDIC SURGERY

## 2022-01-01 PROCEDURE — 250N000009 HC RX 250: Performed by: NURSE ANESTHETIST, CERTIFIED REGISTERED

## 2022-01-01 PROCEDURE — 99223 1ST HOSP IP/OBS HIGH 75: CPT | Mod: AI | Performed by: PHYSICIAN ASSISTANT

## 2022-01-01 PROCEDURE — 87635 SARS-COV-2 COVID-19 AMP PRB: CPT | Performed by: PHYSICIAN ASSISTANT

## 2022-01-01 PROCEDURE — U0005 INFEC AGEN DETEC AMPLI PROBE: HCPCS

## 2022-01-01 PROCEDURE — 97535 SELF CARE MNGMENT TRAINING: CPT | Mod: GO | Performed by: OCCUPATIONAL THERAPIST

## 2022-01-01 PROCEDURE — 250N000013 HC RX MED GY IP 250 OP 250 PS 637: Performed by: ANESTHESIOLOGY

## 2022-01-01 PROCEDURE — 99232 SBSQ HOSP IP/OBS MODERATE 35: CPT | Mod: FS | Performed by: PHYSICIAN ASSISTANT

## 2022-01-01 PROCEDURE — 0QP604Z REMOVAL OF INTERNAL FIXATION DEVICE FROM RIGHT UPPER FEMUR, OPEN APPROACH: ICD-10-PCS | Performed by: ORTHOPAEDIC SURGERY

## 2022-01-01 PROCEDURE — 86850 RBC ANTIBODY SCREEN: CPT | Performed by: PHYSICIAN ASSISTANT

## 2022-01-01 PROCEDURE — 97161 PT EVAL LOW COMPLEX 20 MIN: CPT | Mod: GP | Performed by: PHYSICAL THERAPIST

## 2022-01-01 PROCEDURE — 99239 HOSP IP/OBS DSCHRG MGMT >30: CPT | Performed by: HOSPITALIST

## 2022-01-01 PROCEDURE — 99222 1ST HOSP IP/OBS MODERATE 55: CPT | Performed by: PHYSICIAN ASSISTANT

## 2022-01-01 PROCEDURE — 258N000003 HC RX IP 258 OP 636: Performed by: HOSPITALIST

## 2022-01-01 PROCEDURE — U0003 INFECTIOUS AGENT DETECTION BY NUCLEIC ACID (DNA OR RNA); SEVERE ACUTE RESPIRATORY SYNDROME CORONAVIRUS 2 (SARS-COV-2) (CORONAVIRUS DISEASE [COVID-19]), AMPLIFIED PROBE TECHNIQUE, MAKING USE OF HIGH THROUGHPUT TECHNOLOGIES AS DESCRIBED BY CMS-2020-01-R: HCPCS | Performed by: NURSE PRACTITIONER

## 2022-01-01 PROCEDURE — P9041 ALBUMIN (HUMAN),5%, 50ML: HCPCS | Performed by: NURSE ANESTHETIST, CERTIFIED REGISTERED

## 2022-01-01 PROCEDURE — 278N000051 HC OR IMPLANT GENERAL: Performed by: ORTHOPAEDIC SURGERY

## 2022-01-01 PROCEDURE — 99310 SBSQ NF CARE HIGH MDM 45: CPT | Performed by: NURSE PRACTITIONER

## 2022-01-01 PROCEDURE — 250N000009 HC RX 250: Performed by: PHYSICIAN ASSISTANT

## 2022-01-01 PROCEDURE — 999N000179 XR SURGERY CARM FLUORO LESS THAN 5 MIN W STILLS

## 2022-01-01 PROCEDURE — 86901 BLOOD TYPING SEROLOGIC RH(D): CPT | Performed by: PHYSICIAN ASSISTANT

## 2022-01-01 PROCEDURE — 82374 ASSAY BLOOD CARBON DIOXIDE: CPT | Performed by: NURSE PRACTITIONER

## 2022-01-01 PROCEDURE — 250N000009 HC RX 250: Performed by: NURSE PRACTITIONER

## 2022-01-01 PROCEDURE — 86850 RBC ANTIBODY SCREEN: CPT | Performed by: ANESTHESIOLOGY

## 2022-01-01 PROCEDURE — 99207 PR CONSULT E&M CHANGED TO INITIAL LEVEL: CPT | Performed by: PHYSICIAN ASSISTANT

## 2022-01-01 PROCEDURE — 360N000084 HC SURGERY LEVEL 4 W/ FLUORO, PER MIN: Performed by: ORTHOPAEDIC SURGERY

## 2022-01-01 PROCEDURE — 99283 EMERGENCY DEPT VISIT LOW MDM: CPT

## 2022-01-01 PROCEDURE — 36415 COLL VENOUS BLD VENIPUNCTURE: CPT | Performed by: ANESTHESIOLOGY

## 2022-01-01 PROCEDURE — 0QS634Z REPOSITION RIGHT UPPER FEMUR WITH INTERNAL FIXATION DEVICE, PERCUTANEOUS APPROACH: ICD-10-PCS | Performed by: ORTHOPAEDIC SURGERY

## 2022-01-01 PROCEDURE — 99305 1ST NF CARE MODERATE MDM 35: CPT | Performed by: INTERNAL MEDICINE

## 2022-01-01 PROCEDURE — C1776 JOINT DEVICE (IMPLANTABLE): HCPCS | Performed by: ORTHOPAEDIC SURGERY

## 2022-01-01 PROCEDURE — 93005 ELECTROCARDIOGRAM TRACING: CPT

## 2022-01-01 PROCEDURE — 82310 ASSAY OF CALCIUM: CPT | Performed by: INTERNAL MEDICINE

## 2022-01-01 PROCEDURE — 85027 COMPLETE CBC AUTOMATED: CPT | Performed by: NURSE PRACTITIONER

## 2022-01-01 PROCEDURE — C1769 GUIDE WIRE: HCPCS | Performed by: ORTHOPAEDIC SURGERY

## 2022-01-01 PROCEDURE — 250N000011 HC RX IP 250 OP 636: Performed by: ANESTHESIOLOGY

## 2022-01-01 PROCEDURE — P9604 ONE-WAY ALLOW PRORATED TRIP: HCPCS | Performed by: NURSE PRACTITIONER

## 2022-01-01 DEVICE — BUCK FEMORAL CEMENT RESTRICTOR 25MM
Type: IMPLANTABLE DEVICE | Site: HIP | Status: FUNCTIONAL
Brand: BUCK

## 2022-01-01 DEVICE — IMPLANTABLE DEVICE
Type: IMPLANTABLE DEVICE | Site: HIP | Status: NON-FUNCTIONAL
Removed: 2022-03-24

## 2022-01-01 DEVICE — TRIDENT II TRITANIUM CLUSTER 58F
Type: IMPLANTABLE DEVICE | Site: HIP | Status: FUNCTIONAL
Brand: TRIDENT II

## 2022-01-01 DEVICE — CERAMIC V40 FEMORAL HEAD
Type: IMPLANTABLE DEVICE | Site: HIP | Status: FUNCTIONAL
Brand: BIOLOX

## 2022-01-01 DEVICE — V40 STEM
Type: IMPLANTABLE DEVICE | Site: HIP | Status: FUNCTIONAL
Brand: EXETER

## 2022-01-01 DEVICE — FULL DOSE BONE CEMENT, 10 PACK CATALOG NUMBER IS 6191-1-010
Type: IMPLANTABLE DEVICE | Site: HIP | Status: FUNCTIONAL
Brand: SIMPLEX

## 2022-01-01 DEVICE — LINER- CEMENTLESS
Type: IMPLANTABLE DEVICE | Site: HIP | Status: FUNCTIONAL
Brand: MDM

## 2022-01-01 DEVICE — X3 INSERT FOR ADM/ MDM
Type: IMPLANTABLE DEVICE | Site: HIP | Status: FUNCTIONAL
Brand: X3

## 2022-01-01 RX ORDER — CARBIDOPA AND LEVODOPA 25; 100 MG/1; MG/1
1 TABLET ORAL
Status: DISCONTINUED | OUTPATIENT
Start: 2022-01-01 | End: 2022-01-01 | Stop reason: HOSPADM

## 2022-01-01 RX ORDER — ONDANSETRON 4 MG/1
4 TABLET, ORALLY DISINTEGRATING ORAL EVERY 30 MIN PRN
Status: DISCONTINUED | OUTPATIENT
Start: 2022-01-01 | End: 2022-01-01 | Stop reason: HOSPADM

## 2022-01-01 RX ORDER — ACETAMINOPHEN 325 MG/1
650 TABLET ORAL EVERY 6 HOURS PRN
Status: DISCONTINUED | OUTPATIENT
Start: 2022-01-01 | End: 2022-01-01 | Stop reason: HOSPADM

## 2022-01-01 RX ORDER — PROCHLORPERAZINE MALEATE 5 MG
5 TABLET ORAL EVERY 6 HOURS PRN
Status: DISCONTINUED | OUTPATIENT
Start: 2022-01-01 | End: 2022-01-01

## 2022-01-01 RX ORDER — DROXIDOPA 100 MG/1
200 CAPSULE ORAL 3 TIMES DAILY
Status: DISCONTINUED | OUTPATIENT
Start: 2022-01-01 | End: 2022-01-01

## 2022-01-01 RX ORDER — FENTANYL CITRATE 0.05 MG/ML
25 INJECTION, SOLUTION INTRAMUSCULAR; INTRAVENOUS EVERY 5 MIN PRN
Status: DISCONTINUED | OUTPATIENT
Start: 2022-01-01 | End: 2022-01-01 | Stop reason: HOSPADM

## 2022-01-01 RX ORDER — VANCOMYCIN HYDROCHLORIDE 1 G/200ML
1000 INJECTION, SOLUTION INTRAVENOUS EVERY 12 HOURS
Status: DISCONTINUED | OUTPATIENT
Start: 2022-01-01 | End: 2022-01-01

## 2022-01-01 RX ORDER — ONDANSETRON 2 MG/ML
INJECTION INTRAMUSCULAR; INTRAVENOUS PRN
Status: DISCONTINUED | OUTPATIENT
Start: 2022-01-01 | End: 2022-01-01

## 2022-01-01 RX ORDER — CARBIDOPA 25 MG/1
25 TABLET ORAL ONCE
Status: COMPLETED | OUTPATIENT
Start: 2022-01-01 | End: 2022-01-01

## 2022-01-01 RX ORDER — SENNOSIDES 8.6 MG
1 TABLET ORAL 2 TIMES DAILY PRN
Qty: 20 TABLET | Refills: 0 | Status: ON HOLD | OUTPATIENT
Start: 2022-01-01 | End: 2022-01-01

## 2022-01-01 RX ORDER — PROPRANOLOL HCL 60 MG
60 CAPSULE, EXTENDED RELEASE 24HR ORAL EVERY EVENING
Status: DISCONTINUED | OUTPATIENT
Start: 2022-01-01 | End: 2022-01-01

## 2022-01-01 RX ORDER — NALOXONE HYDROCHLORIDE 0.4 MG/ML
0.4 INJECTION, SOLUTION INTRAMUSCULAR; INTRAVENOUS; SUBCUTANEOUS
Status: DISCONTINUED | OUTPATIENT
Start: 2022-01-01 | End: 2022-01-01 | Stop reason: HOSPADM

## 2022-01-01 RX ORDER — BISACODYL 10 MG
10 SUPPOSITORY, RECTAL RECTAL DAILY PRN
Status: DISCONTINUED | OUTPATIENT
Start: 2022-01-01 | End: 2022-01-01 | Stop reason: HOSPADM

## 2022-01-01 RX ORDER — LIDOCAINE 40 MG/G
CREAM TOPICAL
Status: DISCONTINUED | OUTPATIENT
Start: 2022-01-01 | End: 2022-01-01

## 2022-01-01 RX ORDER — ACETAMINOPHEN 650 MG/1
650 SUPPOSITORY RECTAL EVERY 6 HOURS PRN
Status: DISCONTINUED | OUTPATIENT
Start: 2022-01-01 | End: 2022-01-01 | Stop reason: HOSPADM

## 2022-01-01 RX ORDER — SODIUM CHLORIDE, SODIUM LACTATE, POTASSIUM CHLORIDE, CALCIUM CHLORIDE 600; 310; 30; 20 MG/100ML; MG/100ML; MG/100ML; MG/100ML
INJECTION, SOLUTION INTRAVENOUS CONTINUOUS
Status: DISCONTINUED | OUTPATIENT
Start: 2022-01-01 | End: 2022-01-01 | Stop reason: HOSPADM

## 2022-01-01 RX ORDER — CARBIDOPA 25 MG/1
25 TABLET ORAL 3 TIMES DAILY
Status: DISCONTINUED | OUTPATIENT
Start: 2022-01-01 | End: 2022-01-01 | Stop reason: HOSPADM

## 2022-01-01 RX ORDER — AMOXICILLIN 250 MG
1 CAPSULE ORAL 2 TIMES DAILY
Qty: 40 TABLET | Refills: 0 | Status: SHIPPED | OUTPATIENT
Start: 2022-01-01

## 2022-01-01 RX ORDER — NEOSTIGMINE METHYLSULFATE 1 MG/ML
VIAL (ML) INJECTION PRN
Status: DISCONTINUED | OUTPATIENT
Start: 2022-01-01 | End: 2022-01-01

## 2022-01-01 RX ORDER — ERGOTAMINE TARTRATE AND CAFFEINE 1; 100 MG/1; MG/1
2 TABLET, FILM COATED ORAL DAILY PRN
COMMUNITY

## 2022-01-01 RX ORDER — OXYCODONE HYDROCHLORIDE 5 MG/1
5 TABLET ORAL 4 TIMES DAILY PRN
Qty: 10 TABLET | Refills: 0 | Status: SHIPPED | OUTPATIENT
Start: 2022-01-01

## 2022-01-01 RX ORDER — OXYCODONE HYDROCHLORIDE 5 MG/1
5 TABLET ORAL EVERY 4 HOURS PRN
Status: DISCONTINUED | OUTPATIENT
Start: 2022-01-01 | End: 2022-01-01 | Stop reason: HOSPADM

## 2022-01-01 RX ORDER — FENTANYL CITRATE 50 UG/ML
INJECTION, SOLUTION INTRAMUSCULAR; INTRAVENOUS PRN
Status: DISCONTINUED | OUTPATIENT
Start: 2022-01-01 | End: 2022-01-01

## 2022-01-01 RX ORDER — CHOLECALCIFEROL (VITAMIN D3) 50 MCG
50 TABLET ORAL DAILY
Status: DISCONTINUED | OUTPATIENT
Start: 2022-01-01 | End: 2022-01-01 | Stop reason: HOSPADM

## 2022-01-01 RX ORDER — MAGNESIUM HYDROXIDE 1200 MG/15ML
LIQUID ORAL PRN
Status: DISCONTINUED | OUTPATIENT
Start: 2022-01-01 | End: 2022-01-01 | Stop reason: HOSPADM

## 2022-01-01 RX ORDER — ERGOTAMINE TARTRATE AND CAFFEINE 1; 100 MG/1; MG/1
2 TABLET, FILM COATED ORAL DAILY PRN
Status: DISCONTINUED | OUTPATIENT
Start: 2022-01-01 | End: 2022-01-01 | Stop reason: HOSPADM

## 2022-01-01 RX ORDER — DEXAMETHASONE SODIUM PHOSPHATE 4 MG/ML
INJECTION, SOLUTION INTRA-ARTICULAR; INTRALESIONAL; INTRAMUSCULAR; INTRAVENOUS; SOFT TISSUE PRN
Status: DISCONTINUED | OUTPATIENT
Start: 2022-01-01 | End: 2022-01-01

## 2022-01-01 RX ORDER — CARBIDOPA AND LEVODOPA 25; 100 MG/1; MG/1
1 TABLET ORAL 3 TIMES DAILY
Status: DISCONTINUED | OUTPATIENT
Start: 2022-01-01 | End: 2022-01-01

## 2022-01-01 RX ORDER — MIDODRINE HYDROCHLORIDE 5 MG/1
15 TABLET ORAL 3 TIMES DAILY
COMMUNITY

## 2022-01-01 RX ORDER — LIDOCAINE HYDROCHLORIDE 20 MG/ML
JELLY TOPICAL EVERY 4 HOURS PRN
Status: DISCONTINUED | OUTPATIENT
Start: 2022-01-01 | End: 2022-01-01

## 2022-01-01 RX ORDER — POLYETHYLENE GLYCOL 3350 17 G/17G
17 POWDER, FOR SOLUTION ORAL DAILY
Status: DISCONTINUED | OUTPATIENT
Start: 2022-01-01 | End: 2022-01-01

## 2022-01-01 RX ORDER — MIDODRINE HYDROCHLORIDE 5 MG/1
15 TABLET ORAL 3 TIMES DAILY
Status: DISCONTINUED | OUTPATIENT
Start: 2022-01-01 | End: 2022-01-01 | Stop reason: HOSPADM

## 2022-01-01 RX ORDER — ALBUTEROL SULFATE 0.83 MG/ML
2.5 SOLUTION RESPIRATORY (INHALATION) EVERY 4 HOURS PRN
Status: DISCONTINUED | OUTPATIENT
Start: 2022-01-01 | End: 2022-01-01 | Stop reason: HOSPADM

## 2022-01-01 RX ORDER — CARBIDOPA AND LEVODOPA 25; 100 MG/1; MG/1
1 TABLET ORAL
COMMUNITY

## 2022-01-01 RX ORDER — LIDOCAINE 40 MG/G
CREAM TOPICAL
Status: DISCONTINUED | OUTPATIENT
Start: 2022-01-01 | End: 2022-01-01 | Stop reason: HOSPADM

## 2022-01-01 RX ORDER — PROCHLORPERAZINE 25 MG
12.5 SUPPOSITORY, RECTAL RECTAL EVERY 12 HOURS PRN
Status: DISCONTINUED | OUTPATIENT
Start: 2022-01-01 | End: 2022-01-01

## 2022-01-01 RX ORDER — NALOXONE HYDROCHLORIDE 0.4 MG/ML
0.2 INJECTION, SOLUTION INTRAMUSCULAR; INTRAVENOUS; SUBCUTANEOUS
Status: DISCONTINUED | OUTPATIENT
Start: 2022-01-01 | End: 2022-01-01 | Stop reason: HOSPADM

## 2022-01-01 RX ORDER — HYDROMORPHONE HCL IN WATER/PF 6 MG/30 ML
0.2 PATIENT CONTROLLED ANALGESIA SYRINGE INTRAVENOUS
Status: DISCONTINUED | OUTPATIENT
Start: 2022-01-01 | End: 2022-01-01 | Stop reason: HOSPADM

## 2022-01-01 RX ORDER — CARBIDOPA 25 MG/1
25 TABLET ORAL 3 TIMES DAILY
Status: DISCONTINUED | OUTPATIENT
Start: 2022-01-01 | End: 2022-01-01

## 2022-01-01 RX ORDER — CITALOPRAM HYDROBROMIDE 20 MG/1
20 TABLET ORAL EVERY MORNING
COMMUNITY

## 2022-01-01 RX ORDER — LIDOCAINE HYDROCHLORIDE 20 MG/ML
10 JELLY TOPICAL ONCE
Status: COMPLETED | OUTPATIENT
Start: 2022-01-01 | End: 2022-01-01

## 2022-01-01 RX ORDER — ALBUMIN, HUMAN INJ 5% 5 %
SOLUTION INTRAVENOUS CONTINUOUS PRN
Status: DISCONTINUED | OUTPATIENT
Start: 2022-01-01 | End: 2022-01-01

## 2022-01-01 RX ORDER — PROPOFOL 10 MG/ML
INJECTION, EMULSION INTRAVENOUS PRN
Status: DISCONTINUED | OUTPATIENT
Start: 2022-01-01 | End: 2022-01-01

## 2022-01-01 RX ORDER — METHOCARBAMOL 500 MG/1
500 TABLET, FILM COATED ORAL EVERY 6 HOURS PRN
Status: DISCONTINUED | OUTPATIENT
Start: 2022-01-01 | End: 2022-01-01 | Stop reason: HOSPADM

## 2022-01-01 RX ORDER — LIDOCAINE HYDROCHLORIDE 20 MG/ML
JELLY TOPICAL EVERY 4 HOURS PRN
Status: DISCONTINUED | OUTPATIENT
Start: 2022-01-01 | End: 2022-01-01 | Stop reason: HOSPADM

## 2022-01-01 RX ORDER — DROXIDOPA 100 MG/1
200 CAPSULE ORAL 3 TIMES DAILY
COMMUNITY

## 2022-01-01 RX ORDER — CEFDINIR 300 MG/1
300 CAPSULE ORAL 2 TIMES DAILY
Qty: 14 CAPSULE | Refills: 0 | Status: SHIPPED | OUTPATIENT
Start: 2022-01-01 | End: 2022-01-01

## 2022-01-01 RX ORDER — PROPOFOL 10 MG/ML
INJECTION, EMULSION INTRAVENOUS CONTINUOUS PRN
Status: DISCONTINUED | OUTPATIENT
Start: 2022-01-01 | End: 2022-01-01

## 2022-01-01 RX ORDER — SODIUM CHLORIDE 9 MG/ML
INJECTION, SOLUTION INTRAVENOUS CONTINUOUS
Status: DISCONTINUED | OUTPATIENT
Start: 2022-01-01 | End: 2022-01-01 | Stop reason: HOSPADM

## 2022-01-01 RX ORDER — ASPIRIN 325 MG
325 TABLET, DELAYED RELEASE (ENTERIC COATED) ORAL DAILY
Qty: 30 TABLET | Refills: 0 | Status: ON HOLD | OUTPATIENT
Start: 2022-01-01 | End: 2022-01-01

## 2022-01-01 RX ORDER — CLINDAMYCIN PHOSPHATE 600 MG/50ML
600 INJECTION, SOLUTION INTRAVENOUS EVERY 8 HOURS
Status: COMPLETED | OUTPATIENT
Start: 2022-01-01 | End: 2022-01-01

## 2022-01-01 RX ORDER — CEFAZOLIN SODIUM 2 G/100ML
2 INJECTION, SOLUTION INTRAVENOUS SEE ADMIN INSTRUCTIONS
Status: CANCELLED | OUTPATIENT
Start: 2022-01-01

## 2022-01-01 RX ORDER — GLYCOPYRROLATE 0.2 MG/ML
INJECTION, SOLUTION INTRAMUSCULAR; INTRAVENOUS PRN
Status: DISCONTINUED | OUTPATIENT
Start: 2022-01-01 | End: 2022-01-01

## 2022-01-01 RX ORDER — LIDOCAINE HYDROCHLORIDE 20 MG/ML
INJECTION, SOLUTION INFILTRATION; PERINEURAL PRN
Status: DISCONTINUED | OUTPATIENT
Start: 2022-01-01 | End: 2022-01-01

## 2022-01-01 RX ORDER — TAMSULOSIN HYDROCHLORIDE 0.4 MG/1
0.4 CAPSULE ORAL DAILY
Status: DISCONTINUED | OUTPATIENT
Start: 2022-01-01 | End: 2022-01-01

## 2022-01-01 RX ORDER — OXYCODONE HYDROCHLORIDE 5 MG/1
2.5-5 TABLET ORAL EVERY 4 HOURS PRN
Qty: 25 TABLET | Refills: 0 | Status: ON HOLD | OUTPATIENT
Start: 2022-01-01 | End: 2022-01-01

## 2022-01-01 RX ORDER — POLYETHYLENE GLYCOL 3350 17 G/17G
17 POWDER, FOR SOLUTION ORAL DAILY
Status: DISCONTINUED | OUTPATIENT
Start: 2022-01-01 | End: 2022-01-01 | Stop reason: HOSPADM

## 2022-01-01 RX ORDER — TRANEXAMIC ACID 650 MG/1
1950 TABLET ORAL ONCE
Status: COMPLETED | OUTPATIENT
Start: 2022-01-01 | End: 2022-01-01

## 2022-01-01 RX ORDER — SUMATRIPTAN 50 MG/1
50 TABLET, FILM COATED ORAL DAILY PRN
Status: DISCONTINUED | OUTPATIENT
Start: 2022-01-01 | End: 2022-01-01 | Stop reason: HOSPADM

## 2022-01-01 RX ORDER — LIDOCAINE HYDROCHLORIDE 20 MG/ML
JELLY TOPICAL ONCE
Status: COMPLETED | OUTPATIENT
Start: 2022-01-01 | End: 2022-01-01

## 2022-01-01 RX ORDER — MIDODRINE HYDROCHLORIDE 5 MG/1
15 TABLET ORAL 3 TIMES DAILY
Status: DISCONTINUED | OUTPATIENT
Start: 2022-01-01 | End: 2022-01-01

## 2022-01-01 RX ORDER — SENNOSIDES 8.6 MG
1 TABLET ORAL 2 TIMES DAILY
Status: DISCONTINUED | OUTPATIENT
Start: 2022-01-01 | End: 2022-01-01 | Stop reason: HOSPADM

## 2022-01-01 RX ORDER — OXYCODONE HYDROCHLORIDE 5 MG/1
2.5-5 TABLET ORAL EVERY 4 HOURS PRN
Qty: 30 TABLET | Refills: 0 | Status: SHIPPED | OUTPATIENT
Start: 2022-01-01 | End: 2022-01-01

## 2022-01-01 RX ORDER — CITALOPRAM HYDROBROMIDE 10 MG/1
20 TABLET ORAL EVERY MORNING
Status: DISCONTINUED | OUTPATIENT
Start: 2022-01-01 | End: 2022-01-01 | Stop reason: HOSPADM

## 2022-01-01 RX ORDER — OXYCODONE HYDROCHLORIDE 5 MG/1
5 TABLET ORAL 2 TIMES DAILY
Qty: 30 TABLET | Refills: 0 | Status: SHIPPED | OUTPATIENT
Start: 2022-01-01 | End: 2022-01-01

## 2022-01-01 RX ORDER — MEPERIDINE HYDROCHLORIDE 25 MG/ML
12.5 INJECTION INTRAMUSCULAR; INTRAVENOUS; SUBCUTANEOUS EVERY 5 MIN PRN
Status: DISCONTINUED | OUTPATIENT
Start: 2022-01-01 | End: 2022-01-01 | Stop reason: HOSPADM

## 2022-01-01 RX ORDER — TRANEXAMIC ACID 10 MG/ML
1 INJECTION, SOLUTION INTRAVENOUS ONCE
Status: COMPLETED | OUTPATIENT
Start: 2022-01-01 | End: 2022-01-01

## 2022-01-01 RX ORDER — CARBIDOPA 25 MG/1
25 TABLET ORAL 3 TIMES DAILY
COMMUNITY

## 2022-01-01 RX ORDER — CYCLOBENZAPRINE HCL 10 MG
10 TABLET ORAL DAILY
Status: DISCONTINUED | OUTPATIENT
Start: 2022-01-01 | End: 2022-01-01 | Stop reason: HOSPADM

## 2022-01-01 RX ORDER — ACETAMINOPHEN 325 MG/1
650 TABLET ORAL EVERY 6 HOURS PRN
Qty: 60 TABLET | Refills: 0 | Status: SHIPPED | OUTPATIENT
Start: 2022-01-01 | End: 2022-01-01

## 2022-01-01 RX ORDER — CEFAZOLIN SODIUM/WATER 2 G/20 ML
2 SYRINGE (ML) INTRAVENOUS SEE ADMIN INSTRUCTIONS
Status: DISCONTINUED | OUTPATIENT
Start: 2022-01-01 | End: 2022-01-01 | Stop reason: HOSPADM

## 2022-01-01 RX ORDER — HYDROMORPHONE HCL IN WATER/PF 6 MG/30 ML
0.2 PATIENT CONTROLLED ANALGESIA SYRINGE INTRAVENOUS EVERY 5 MIN PRN
Status: DISCONTINUED | OUTPATIENT
Start: 2022-01-01 | End: 2022-01-01 | Stop reason: HOSPADM

## 2022-01-01 RX ORDER — BUPIVACAINE HYDROCHLORIDE AND EPINEPHRINE 5; 5 MG/ML; UG/ML
INJECTION, SOLUTION PERINEURAL PRN
Status: DISCONTINUED | OUTPATIENT
Start: 2022-01-01 | End: 2022-01-01 | Stop reason: HOSPADM

## 2022-01-01 RX ORDER — SUMATRIPTAN 50 MG/1
50 TABLET, FILM COATED ORAL DAILY PRN
Qty: 10 TABLET | Refills: 0 | Status: SHIPPED | OUTPATIENT
Start: 2022-01-01

## 2022-01-01 RX ORDER — CITALOPRAM HYDROBROMIDE 20 MG/1
20 TABLET ORAL DAILY
Status: DISCONTINUED | OUTPATIENT
Start: 2022-01-01 | End: 2022-01-01 | Stop reason: HOSPADM

## 2022-01-01 RX ORDER — ONDANSETRON 2 MG/ML
4 INJECTION INTRAMUSCULAR; INTRAVENOUS EVERY 6 HOURS PRN
Status: DISCONTINUED | OUTPATIENT
Start: 2022-01-01 | End: 2022-01-01 | Stop reason: HOSPADM

## 2022-01-01 RX ORDER — FENTANYL CITRATE 50 UG/ML
50 INJECTION, SOLUTION INTRAMUSCULAR; INTRAVENOUS EVERY 5 MIN PRN
Status: DISCONTINUED | OUTPATIENT
Start: 2022-01-01 | End: 2022-01-01 | Stop reason: HOSPADM

## 2022-01-01 RX ORDER — VANCOMYCIN HYDROCHLORIDE 1 G/20ML
INJECTION, POWDER, LYOPHILIZED, FOR SOLUTION INTRAVENOUS PRN
Status: DISCONTINUED | OUTPATIENT
Start: 2022-01-01 | End: 2022-01-01 | Stop reason: HOSPADM

## 2022-01-01 RX ORDER — ONDANSETRON 4 MG/1
4 TABLET, ORALLY DISINTEGRATING ORAL EVERY 6 HOURS PRN
Status: DISCONTINUED | OUTPATIENT
Start: 2022-01-01 | End: 2022-01-01 | Stop reason: HOSPADM

## 2022-01-01 RX ORDER — HYDROMORPHONE HCL IN WATER/PF 6 MG/30 ML
0.4 PATIENT CONTROLLED ANALGESIA SYRINGE INTRAVENOUS EVERY 5 MIN PRN
Status: DISCONTINUED | OUTPATIENT
Start: 2022-01-01 | End: 2022-01-01 | Stop reason: HOSPADM

## 2022-01-01 RX ORDER — CEFAZOLIN SODIUM/WATER 2 G/20 ML
2 SYRINGE (ML) INTRAVENOUS
Status: DISCONTINUED | OUTPATIENT
Start: 2022-01-01 | End: 2022-01-01 | Stop reason: HOSPADM

## 2022-01-01 RX ORDER — ONDANSETRON 2 MG/ML
4 INJECTION INTRAMUSCULAR; INTRAVENOUS EVERY 30 MIN PRN
Status: DISCONTINUED | OUTPATIENT
Start: 2022-01-01 | End: 2022-01-01 | Stop reason: HOSPADM

## 2022-01-01 RX ORDER — BUPIVACAINE HYDROCHLORIDE AND EPINEPHRINE 5; 5 MG/ML; UG/ML
INJECTION, SOLUTION EPIDURAL; INTRACAUDAL; PERINEURAL PRN
Status: DISCONTINUED | OUTPATIENT
Start: 2022-01-01 | End: 2022-01-01 | Stop reason: HOSPADM

## 2022-01-01 RX ORDER — ACETAMINOPHEN 325 MG/1
975 TABLET ORAL EVERY 8 HOURS
Status: COMPLETED | OUTPATIENT
Start: 2022-01-01 | End: 2022-01-01

## 2022-01-01 RX ORDER — DROXIDOPA 100 MG/1
200 CAPSULE ORAL 3 TIMES DAILY
Status: DISCONTINUED | OUTPATIENT
Start: 2022-01-01 | End: 2022-01-01 | Stop reason: HOSPADM

## 2022-01-01 RX ORDER — CHOLECALCIFEROL (VITAMIN D3) 50 MCG
50 TABLET ORAL DAILY
Qty: 30 TABLET | Refills: 0 | Status: SHIPPED | OUTPATIENT
Start: 2022-01-01

## 2022-01-01 RX ORDER — CLINDAMYCIN PHOSPHATE 900 MG/50ML
900 INJECTION, SOLUTION INTRAVENOUS
Status: COMPLETED | OUTPATIENT
Start: 2022-01-01 | End: 2022-01-01

## 2022-01-01 RX ORDER — AMOXICILLIN 250 MG
1 CAPSULE ORAL 2 TIMES DAILY
Status: DISCONTINUED | OUTPATIENT
Start: 2022-01-01 | End: 2022-01-01 | Stop reason: HOSPADM

## 2022-01-01 RX ORDER — CARBIDOPA AND LEVODOPA 25; 100 MG/1; MG/1
1 TABLET ORAL 3 TIMES DAILY
Status: DISCONTINUED | OUTPATIENT
Start: 2022-01-01 | End: 2022-01-01 | Stop reason: HOSPADM

## 2022-01-01 RX ORDER — FENTANYL CITRATE 0.05 MG/ML
50 INJECTION, SOLUTION INTRAMUSCULAR; INTRAVENOUS
Status: DISCONTINUED | OUTPATIENT
Start: 2022-01-01 | End: 2022-01-01 | Stop reason: HOSPADM

## 2022-01-01 RX ORDER — ACETAMINOPHEN 500 MG
500-1000 TABLET ORAL EVERY 6 HOURS PRN
Status: ON HOLD | COMMUNITY
End: 2022-01-01

## 2022-01-01 RX ORDER — ACETAMINOPHEN 500 MG
1000 TABLET ORAL 3 TIMES DAILY
COMMUNITY

## 2022-01-01 RX ORDER — ASPIRIN 325 MG
325 TABLET, DELAYED RELEASE (ENTERIC COATED) ORAL DAILY
Qty: 45 TABLET | Refills: 0 | Status: SHIPPED | OUTPATIENT
Start: 2022-01-01

## 2022-01-01 RX ORDER — LIDOCAINE HYDROCHLORIDE 20 MG/ML
JELLY TOPICAL ONCE
Status: DISCONTINUED | OUTPATIENT
Start: 2022-01-01 | End: 2022-01-01 | Stop reason: HOSPADM

## 2022-01-01 RX ORDER — CYCLOBENZAPRINE HCL 10 MG
10 TABLET ORAL EVERY MORNING
COMMUNITY

## 2022-01-01 RX ORDER — ACETAMINOPHEN 325 MG/1
650 TABLET ORAL EVERY 4 HOURS PRN
Status: DISCONTINUED | OUTPATIENT
Start: 2022-01-01 | End: 2022-01-01 | Stop reason: HOSPADM

## 2022-01-01 RX ORDER — CEFAZOLIN SODIUM 2 G/100ML
2 INJECTION, SOLUTION INTRAVENOUS
Status: CANCELLED | OUTPATIENT
Start: 2022-01-01

## 2022-01-01 RX ORDER — ONDANSETRON 2 MG/ML
4 INJECTION INTRAMUSCULAR; INTRAVENOUS
Status: DISCONTINUED | OUTPATIENT
Start: 2022-01-01 | End: 2022-01-01 | Stop reason: HOSPADM

## 2022-01-01 RX ORDER — DROXIDOPA 100 MG/1
200 CAPSULE ORAL 3 TIMES DAILY
Status: DISCONTINUED | OUTPATIENT
Start: 2022-01-01 | End: 2022-01-01 | Stop reason: RX

## 2022-01-01 RX ADMIN — OXYCODONE HYDROCHLORIDE 5 MG: 5 TABLET ORAL at 05:45

## 2022-01-01 RX ADMIN — HYDROMORPHONE HYDROCHLORIDE 0.2 MG: 0.2 INJECTION, SOLUTION INTRAMUSCULAR; INTRAVENOUS; SUBCUTANEOUS at 17:05

## 2022-01-01 RX ADMIN — CARBIDOPA AND LEVODOPA 1 HALF-TAB: 25; 100 TABLET ORAL at 17:04

## 2022-01-01 RX ADMIN — MIDODRINE HYDROCHLORIDE 15 MG: 5 TABLET ORAL at 21:38

## 2022-01-01 RX ADMIN — CITALOPRAM HYDROBROMIDE 20 MG: 10 TABLET ORAL at 08:59

## 2022-01-01 RX ADMIN — CARBIDOPA AND LEVODOPA 1 HALF-TAB: 25; 100 TABLET ORAL at 08:16

## 2022-01-01 RX ADMIN — MIDODRINE HYDROCHLORIDE 15 MG: 5 TABLET ORAL at 08:28

## 2022-01-01 RX ADMIN — CARBIDOPA AND LEVODOPA 3 HALF-TAB: 25; 100 TABLET ORAL at 17:46

## 2022-01-01 RX ADMIN — POLYETHYLENE GLYCOL 3350 17 G: 17 POWDER, FOR SOLUTION ORAL at 08:59

## 2022-01-01 RX ADMIN — SENNOSIDES AND DOCUSATE SODIUM 1 TABLET: 50; 8.6 TABLET ORAL at 08:58

## 2022-01-01 RX ADMIN — ACETAMINOPHEN 975 MG: 325 TABLET ORAL at 18:47

## 2022-01-01 RX ADMIN — CARBIDOPA 25 MG: 25 TABLET ORAL at 08:14

## 2022-01-01 RX ADMIN — MIDODRINE HYDROCHLORIDE 15 MG: 5 TABLET ORAL at 08:03

## 2022-01-01 RX ADMIN — CARBIDOPA 25 MG: 25 TABLET ORAL at 08:10

## 2022-01-01 RX ADMIN — CARBIDOPA 25 MG: 25 TABLET ORAL at 21:36

## 2022-01-01 RX ADMIN — CARBIDOPA AND LEVODOPA 1 HALF-TAB: 25; 100 TABLET ORAL at 08:04

## 2022-01-01 RX ADMIN — CARBIDOPA AND LEVODOPA 1 TABLET: 25; 100 TABLET ORAL at 17:04

## 2022-01-01 RX ADMIN — OXYCODONE HYDROCHLORIDE 5 MG: 5 TABLET ORAL at 16:25

## 2022-01-01 RX ADMIN — SENNOSIDES AND DOCUSATE SODIUM 1 TABLET: 50; 8.6 TABLET ORAL at 21:36

## 2022-01-01 RX ADMIN — CARBIDOPA AND LEVODOPA 1 TABLET: 25; 100 TABLET ORAL at 08:16

## 2022-01-01 RX ADMIN — PHENYLEPHRINE HYDROCHLORIDE 100 MCG: 10 INJECTION INTRAVENOUS at 15:48

## 2022-01-01 RX ADMIN — FENTANYL CITRATE 50 MCG: 50 INJECTION, SOLUTION INTRAMUSCULAR; INTRAVENOUS at 15:20

## 2022-01-01 RX ADMIN — Medication 50 MCG: at 21:37

## 2022-01-01 RX ADMIN — LIDOCAINE HYDROCHLORIDE 10 ML: 20 JELLY TOPICAL at 15:46

## 2022-01-01 RX ADMIN — CYCLOBENZAPRINE 10 MG: 10 TABLET, FILM COATED ORAL at 08:03

## 2022-01-01 RX ADMIN — OXYCODONE HYDROCHLORIDE 5 MG: 5 TABLET ORAL at 09:36

## 2022-01-01 RX ADMIN — CARBIDOPA 25 MG: 25 TABLET ORAL at 09:09

## 2022-01-01 RX ADMIN — CARBIDOPA 25 MG: 25 TABLET ORAL at 17:47

## 2022-01-01 RX ADMIN — ROCURONIUM BROMIDE 10 MG: 50 INJECTION, SOLUTION INTRAVENOUS at 15:13

## 2022-01-01 RX ADMIN — PHENYLEPHRINE HYDROCHLORIDE 100 MCG: 10 INJECTION INTRAVENOUS at 14:45

## 2022-01-01 RX ADMIN — MIDODRINE HYDROCHLORIDE 15 MG: 5 TABLET ORAL at 21:06

## 2022-01-01 RX ADMIN — SENNOSIDES AND DOCUSATE SODIUM 1 TABLET: 50; 8.6 TABLET ORAL at 21:06

## 2022-01-01 RX ADMIN — CITALOPRAM HYDROBROMIDE 20 MG: 10 TABLET ORAL at 08:16

## 2022-01-01 RX ADMIN — HYDROMORPHONE HYDROCHLORIDE 0.2 MG: 0.2 INJECTION, SOLUTION INTRAMUSCULAR; INTRAVENOUS; SUBCUTANEOUS at 21:38

## 2022-01-01 RX ADMIN — HYDROMORPHONE HYDROCHLORIDE 0.2 MG: 0.2 INJECTION, SOLUTION INTRAMUSCULAR; INTRAVENOUS; SUBCUTANEOUS at 18:56

## 2022-01-01 RX ADMIN — VANCOMYCIN HYDROCHLORIDE 1.5 G: 10 INJECTION, POWDER, LYOPHILIZED, FOR SOLUTION INTRAVENOUS at 13:57

## 2022-01-01 RX ADMIN — Medication 50 MCG: at 08:58

## 2022-01-01 RX ADMIN — CARBIDOPA 25 MG: 25 TABLET ORAL at 15:42

## 2022-01-01 RX ADMIN — ASPIRIN 325 MG: 325 TABLET, COATED ORAL at 08:15

## 2022-01-01 RX ADMIN — CARBIDOPA AND LEVODOPA 1 HALF-TAB: 25; 100 TABLET ORAL at 08:58

## 2022-01-01 RX ADMIN — DEXAMETHASONE SODIUM PHOSPHATE 4 MG: 4 INJECTION, SOLUTION INTRA-ARTICULAR; INTRALESIONAL; INTRAMUSCULAR; INTRAVENOUS; SOFT TISSUE at 14:29

## 2022-01-01 RX ADMIN — ACETAMINOPHEN 975 MG: 325 TABLET ORAL at 09:36

## 2022-01-01 RX ADMIN — CARBIDOPA 25 MG: 25 TABLET ORAL at 18:08

## 2022-01-01 RX ADMIN — METHOCARBAMOL 500 MG: 500 TABLET ORAL at 15:57

## 2022-01-01 RX ADMIN — MIDODRINE HYDROCHLORIDE 15 MG: 5 TABLET ORAL at 08:15

## 2022-01-01 RX ADMIN — TRANEXAMIC ACID 1 G: 10 INJECTION, SOLUTION INTRAVENOUS at 16:06

## 2022-01-01 RX ADMIN — Medication 3 HALF-TAB: at 18:09

## 2022-01-01 RX ADMIN — SODIUM CHLORIDE: 9 INJECTION, SOLUTION INTRAVENOUS at 23:50

## 2022-01-01 RX ADMIN — LIDOCAINE HYDROCHLORIDE: 20 JELLY TOPICAL at 02:50

## 2022-01-01 RX ADMIN — CARBIDOPA AND LEVODOPA 1 HALF-TAB: 25; 100 TABLET ORAL at 21:38

## 2022-01-01 RX ADMIN — ACETAMINOPHEN 975 MG: 325 TABLET ORAL at 03:17

## 2022-01-01 RX ADMIN — ALBUMIN HUMAN: 0.05 INJECTION, SOLUTION INTRAVENOUS at 15:20

## 2022-01-01 RX ADMIN — MIDODRINE HYDROCHLORIDE 15 MG: 5 TABLET ORAL at 12:01

## 2022-01-01 RX ADMIN — DROXIDOPA 200 MG: 100 CAPSULE ORAL at 17:09

## 2022-01-01 RX ADMIN — DROXIDOPA 200 MG: 100 CAPSULE ORAL at 08:33

## 2022-01-01 RX ADMIN — PHENYLEPHRINE HYDROCHLORIDE 100 MCG: 10 INJECTION INTRAVENOUS at 14:24

## 2022-01-01 RX ADMIN — SODIUM CHLORIDE, POTASSIUM CHLORIDE, SODIUM LACTATE AND CALCIUM CHLORIDE: 600; 310; 30; 20 INJECTION, SOLUTION INTRAVENOUS at 13:50

## 2022-01-01 RX ADMIN — ONDANSETRON 4 MG: 2 INJECTION INTRAMUSCULAR; INTRAVENOUS at 16:09

## 2022-01-01 RX ADMIN — ERGOTAMINE TARTRATE AND CAFFEINE 2 TABLET: 1; 100 TABLET, FILM COATED ORAL at 08:34

## 2022-01-01 RX ADMIN — CARBIDOPA 25 MG: 25 TABLET ORAL at 12:01

## 2022-01-01 RX ADMIN — MIDODRINE HYDROCHLORIDE 15 MG: 5 TABLET ORAL at 21:36

## 2022-01-01 RX ADMIN — MIDODRINE HYDROCHLORIDE 15 MG: 5 TABLET ORAL at 18:10

## 2022-01-01 RX ADMIN — SENNOSIDES AND DOCUSATE SODIUM 1 TABLET: 50; 8.6 TABLET ORAL at 08:14

## 2022-01-01 RX ADMIN — OXYCODONE HYDROCHLORIDE 5 MG: 5 TABLET ORAL at 01:21

## 2022-01-01 RX ADMIN — NEOSTIGMINE METHYLSULFATE 3 MG: 1 INJECTION, SOLUTION INTRAVENOUS at 16:16

## 2022-01-01 RX ADMIN — CITALOPRAM HYDROBROMIDE 20 MG: 10 TABLET ORAL at 08:03

## 2022-01-01 RX ADMIN — SODIUM CHLORIDE, POTASSIUM CHLORIDE, SODIUM LACTATE AND CALCIUM CHLORIDE: 600; 310; 30; 20 INJECTION, SOLUTION INTRAVENOUS at 18:36

## 2022-01-01 RX ADMIN — METHOCARBAMOL 500 MG: 500 TABLET ORAL at 14:52

## 2022-01-01 RX ADMIN — PROPOFOL 25 MCG/KG/MIN: 10 INJECTION, EMULSION INTRAVENOUS at 14:32

## 2022-01-01 RX ADMIN — Medication 3 HALF-TAB: at 12:25

## 2022-01-01 RX ADMIN — ASPIRIN 325 MG: 325 TABLET, COATED ORAL at 08:03

## 2022-01-01 RX ADMIN — ASPIRIN 325 MG: 325 TABLET, COATED ORAL at 18:48

## 2022-01-01 RX ADMIN — CARBIDOPA AND LEVODOPA 1 HALF-TAB: 25; 100 TABLET ORAL at 22:04

## 2022-01-01 RX ADMIN — CARBIDOPA 25 MG: 25 TABLET ORAL at 15:53

## 2022-01-01 RX ADMIN — LIDOCAINE HYDROCHLORIDE: 20 JELLY TOPICAL at 16:30

## 2022-01-01 RX ADMIN — CITALOPRAM HYDROBROMIDE 20 MG: 10 TABLET ORAL at 08:14

## 2022-01-01 RX ADMIN — CARBIDOPA 25 MG: 25 TABLET ORAL at 08:29

## 2022-01-01 RX ADMIN — CARBIDOPA AND LEVODOPA 1 TABLET: 25; 100 TABLET ORAL at 21:06

## 2022-01-01 RX ADMIN — CARBIDOPA AND LEVODOPA 1 TABLET: 25; 100 TABLET ORAL at 08:59

## 2022-01-01 RX ADMIN — CLINDAMYCIN PHOSPHATE 900 MG: 900 INJECTION, SOLUTION INTRAVENOUS at 15:15

## 2022-01-01 RX ADMIN — CARBIDOPA AND LEVODOPA 1 HALF-TAB: 25; 100 TABLET ORAL at 15:53

## 2022-01-01 RX ADMIN — OXYCODONE HYDROCHLORIDE 5 MG: 5 TABLET ORAL at 20:00

## 2022-01-01 RX ADMIN — PROPOFOL 100 MG: 10 INJECTION, EMULSION INTRAVENOUS at 14:04

## 2022-01-01 RX ADMIN — CARBIDOPA AND LEVODOPA 1 HALF-TAB: 25; 100 TABLET ORAL at 21:06

## 2022-01-01 RX ADMIN — ACETAMINOPHEN 975 MG: 325 TABLET ORAL at 13:24

## 2022-01-01 RX ADMIN — ACETAMINOPHEN 975 MG: 325 TABLET ORAL at 05:58

## 2022-01-01 RX ADMIN — SENNOSIDES AND DOCUSATE SODIUM 1 TABLET: 50; 8.6 TABLET ORAL at 21:38

## 2022-01-01 RX ADMIN — CARBIDOPA 25 MG: 25 TABLET ORAL at 01:21

## 2022-01-01 RX ADMIN — SODIUM CHLORIDE, POTASSIUM CHLORIDE, SODIUM LACTATE AND CALCIUM CHLORIDE: 600; 310; 30; 20 INJECTION, SOLUTION INTRAVENOUS at 15:52

## 2022-01-01 RX ADMIN — CARBIDOPA 25 MG: 25 TABLET ORAL at 21:06

## 2022-01-01 RX ADMIN — PHENYLEPHRINE HYDROCHLORIDE 100 MCG: 10 INJECTION INTRAVENOUS at 15:01

## 2022-01-01 RX ADMIN — MIDODRINE HYDROCHLORIDE 15 MG: 5 TABLET ORAL at 17:03

## 2022-01-01 RX ADMIN — ROCURONIUM BROMIDE 30 MG: 50 INJECTION, SOLUTION INTRAVENOUS at 15:20

## 2022-01-01 RX ADMIN — SODIUM CHLORIDE: 9 INJECTION, SOLUTION INTRAVENOUS at 09:39

## 2022-01-01 RX ADMIN — PHENYLEPHRINE HYDROCHLORIDE 100 MCG: 10 INJECTION INTRAVENOUS at 15:03

## 2022-01-01 RX ADMIN — SODIUM CHLORIDE, POTASSIUM CHLORIDE, SODIUM LACTATE AND CALCIUM CHLORIDE: 600; 310; 30; 20 INJECTION, SOLUTION INTRAVENOUS at 01:11

## 2022-01-01 RX ADMIN — PHENYLEPHRINE HYDROCHLORIDE 100 MCG: 10 INJECTION INTRAVENOUS at 16:07

## 2022-01-01 RX ADMIN — CARBIDOPA AND LEVODOPA 1 TABLET: 25; 100 TABLET ORAL at 15:53

## 2022-01-01 RX ADMIN — FENTANYL CITRATE 100 MCG: 50 INJECTION, SOLUTION INTRAMUSCULAR; INTRAVENOUS at 14:02

## 2022-01-01 RX ADMIN — CLINDAMYCIN PHOSPHATE 600 MG: 600 INJECTION, SOLUTION INTRAVENOUS at 00:56

## 2022-01-01 RX ADMIN — MIDODRINE HYDROCHLORIDE 15 MG: 5 TABLET ORAL at 08:58

## 2022-01-01 RX ADMIN — CARBIDOPA 25 MG: 25 TABLET ORAL at 17:06

## 2022-01-01 RX ADMIN — HYDROMORPHONE HYDROCHLORIDE 0.2 MG: 0.2 INJECTION, SOLUTION INTRAMUSCULAR; INTRAVENOUS; SUBCUTANEOUS at 01:21

## 2022-01-01 RX ADMIN — LIDOCAINE HYDROCHLORIDE: 20 JELLY TOPICAL at 00:21

## 2022-01-01 RX ADMIN — SENNOSIDES 1 TABLET: 8.6 TABLET, FILM COATED ORAL at 22:53

## 2022-01-01 RX ADMIN — Medication 3 HALF-TAB: at 08:04

## 2022-01-01 RX ADMIN — ONDANSETRON 4 MG: 2 INJECTION INTRAMUSCULAR; INTRAVENOUS at 15:54

## 2022-01-01 RX ADMIN — CITALOPRAM HYDROBROMIDE 20 MG: 20 TABLET ORAL at 08:29

## 2022-01-01 RX ADMIN — OXYCODONE HYDROCHLORIDE 5 MG: 5 TABLET ORAL at 12:05

## 2022-01-01 RX ADMIN — DEXAMETHASONE SODIUM PHOSPHATE 10 MG: 4 INJECTION, SOLUTION INTRA-ARTICULAR; INTRALESIONAL; INTRAMUSCULAR; INTRAVENOUS; SOFT TISSUE at 15:34

## 2022-01-01 RX ADMIN — CARBIDOPA AND LEVODOPA 1 HALF-TAB: 25; 100 TABLET ORAL at 21:37

## 2022-01-01 RX ADMIN — OXYCODONE HYDROCHLORIDE 5 MG: 5 TABLET ORAL at 13:26

## 2022-01-01 RX ADMIN — METHOCARBAMOL 500 MG: 500 TABLET ORAL at 20:37

## 2022-01-01 RX ADMIN — SODIUM CHLORIDE, POTASSIUM CHLORIDE, SODIUM LACTATE AND CALCIUM CHLORIDE: 600; 310; 30; 20 INJECTION, SOLUTION INTRAVENOUS at 13:55

## 2022-01-01 RX ADMIN — GLYCOPYRROLATE 0.6 MG: 0.2 INJECTION, SOLUTION INTRAMUSCULAR; INTRAVENOUS at 16:15

## 2022-01-01 RX ADMIN — ASPIRIN 325 MG: 325 TABLET, COATED ORAL at 08:04

## 2022-01-01 RX ADMIN — DROXIDOPA 200 MG: 100 CAPSULE ORAL at 08:04

## 2022-01-01 RX ADMIN — ASPIRIN 325 MG: 325 TABLET, COATED ORAL at 08:59

## 2022-01-01 RX ADMIN — PROPOFOL 130 MG: 10 INJECTION, EMULSION INTRAVENOUS at 15:20

## 2022-01-01 RX ADMIN — MIDODRINE HYDROCHLORIDE 15 MG: 5 TABLET ORAL at 15:53

## 2022-01-01 RX ADMIN — OXYCODONE HYDROCHLORIDE 5 MG: 5 TABLET ORAL at 20:37

## 2022-01-01 RX ADMIN — MIDODRINE HYDROCHLORIDE 15 MG: 5 TABLET ORAL at 17:06

## 2022-01-01 RX ADMIN — TRANEXAMIC ACID 1 G: 10 INJECTION, SOLUTION INTRAVENOUS at 15:15

## 2022-01-01 RX ADMIN — SODIUM CHLORIDE 500 ML: 9 INJECTION, SOLUTION INTRAVENOUS at 12:03

## 2022-01-01 RX ADMIN — CYCLOBENZAPRINE 10 MG: 10 TABLET, FILM COATED ORAL at 08:29

## 2022-01-01 RX ADMIN — Medication 50 MCG: at 08:14

## 2022-01-01 RX ADMIN — SODIUM CHLORIDE 250 ML: 9 INJECTION, SOLUTION INTRAVENOUS at 20:31

## 2022-01-01 RX ADMIN — ACETAMINOPHEN 650 MG: 325 TABLET, FILM COATED ORAL at 03:20

## 2022-01-01 RX ADMIN — CARBIDOPA 25 MG: 25 TABLET ORAL at 08:16

## 2022-01-01 RX ADMIN — CARBIDOPA AND LEVODOPA 1 TABLET: 25; 100 TABLET ORAL at 21:36

## 2022-01-01 RX ADMIN — LIDOCAINE HYDROCHLORIDE: 20 JELLY TOPICAL at 13:25

## 2022-01-01 RX ADMIN — GLYCOPYRROLATE 0.4 MG: 0.2 INJECTION, SOLUTION INTRAMUSCULAR; INTRAVENOUS at 16:16

## 2022-01-01 RX ADMIN — Medication 50 MCG: at 08:16

## 2022-01-01 RX ADMIN — CARBIDOPA AND LEVODOPA 1 TABLET: 25; 100 TABLET ORAL at 08:03

## 2022-01-01 RX ADMIN — SENNOSIDES 1 TABLET: 8.6 TABLET, FILM COATED ORAL at 21:45

## 2022-01-01 RX ADMIN — NEOSTIGMINE METHYLSULFATE 4 MG: 1 INJECTION, SOLUTION INTRAVENOUS at 16:15

## 2022-01-01 RX ADMIN — OXYCODONE HYDROCHLORIDE 2.5 MG: 5 TABLET ORAL at 19:30

## 2022-01-01 RX ADMIN — CARBIDOPA 25 MG: 25 TABLET ORAL at 22:04

## 2022-01-01 RX ADMIN — OXYCODONE HYDROCHLORIDE 5 MG: 5 TABLET ORAL at 03:31

## 2022-01-01 RX ADMIN — SENNOSIDES AND DOCUSATE SODIUM 1 TABLET: 50; 8.6 TABLET ORAL at 20:37

## 2022-01-01 RX ADMIN — HYDROMORPHONE HYDROCHLORIDE 0.5 MG: 1 INJECTION, SOLUTION INTRAMUSCULAR; INTRAVENOUS; SUBCUTANEOUS at 15:05

## 2022-01-01 RX ADMIN — PHENYLEPHRINE HYDROCHLORIDE 100 MCG: 10 INJECTION INTRAVENOUS at 14:04

## 2022-01-01 RX ADMIN — OXYCODONE HYDROCHLORIDE 5 MG: 5 TABLET ORAL at 08:03

## 2022-01-01 RX ADMIN — LIDOCAINE HYDROCHLORIDE 80 MG: 20 INJECTION, SOLUTION INFILTRATION; PERINEURAL at 14:03

## 2022-01-01 RX ADMIN — CARBIDOPA AND LEVODOPA 1 TABLET: 25; 100 TABLET ORAL at 15:42

## 2022-01-01 RX ADMIN — Medication 3 HALF-TAB: at 17:06

## 2022-01-01 RX ADMIN — CARBIDOPA AND LEVODOPA 1 TABLET: 25; 100 TABLET ORAL at 22:04

## 2022-01-01 RX ADMIN — POLYETHYLENE GLYCOL 3350 17 G: 17 POWDER, FOR SOLUTION ORAL at 08:04

## 2022-01-01 RX ADMIN — SODIUM CHLORIDE: 9 INJECTION, SOLUTION INTRAVENOUS at 00:51

## 2022-01-01 RX ADMIN — POLYETHYLENE GLYCOL 3350 17 G: 17 POWDER, FOR SOLUTION ORAL at 08:17

## 2022-01-01 RX ADMIN — CARBIDOPA AND LEVODOPA 1 HALF-TAB: 25; 100 TABLET ORAL at 15:42

## 2022-01-01 RX ADMIN — SENNOSIDES AND DOCUSATE SODIUM 1 TABLET: 50; 8.6 TABLET ORAL at 08:04

## 2022-01-01 RX ADMIN — METHOCARBAMOL 500 MG: 500 TABLET ORAL at 08:15

## 2022-01-01 RX ADMIN — OXYCODONE HYDROCHLORIDE 5 MG: 5 TABLET ORAL at 12:01

## 2022-01-01 RX ADMIN — PHENYLEPHRINE HYDROCHLORIDE 100 MCG: 10 INJECTION INTRAVENOUS at 14:18

## 2022-01-01 RX ADMIN — CITALOPRAM HYDROBROMIDE 20 MG: 20 TABLET ORAL at 08:03

## 2022-01-01 RX ADMIN — CARBIDOPA 25 MG: 25 TABLET ORAL at 17:04

## 2022-01-01 RX ADMIN — PHENYLEPHRINE HYDROCHLORIDE 100 MCG: 10 INJECTION INTRAVENOUS at 15:52

## 2022-01-01 RX ADMIN — ACETAMINOPHEN 650 MG: 325 TABLET, FILM COATED ORAL at 00:04

## 2022-01-01 RX ADMIN — PHENYLEPHRINE HYDROCHLORIDE 100 MCG: 10 INJECTION INTRAVENOUS at 15:59

## 2022-01-01 RX ADMIN — SENNOSIDES 1 TABLET: 8.6 TABLET, FILM COATED ORAL at 08:02

## 2022-01-01 RX ADMIN — DROXIDOPA 200 MG: 100 CAPSULE ORAL at 12:01

## 2022-01-01 RX ADMIN — METHOCARBAMOL 500 MG: 500 TABLET ORAL at 05:29

## 2022-01-01 RX ADMIN — OXYCODONE HYDROCHLORIDE 5 MG: 5 TABLET ORAL at 03:20

## 2022-01-01 RX ADMIN — ASPIRIN 325 MG: 325 TABLET, COATED ORAL at 08:14

## 2022-01-01 RX ADMIN — CLINDAMYCIN PHOSPHATE 600 MG: 600 INJECTION, SOLUTION INTRAVENOUS at 06:07

## 2022-01-01 RX ADMIN — ACETAMINOPHEN 650 MG: 325 TABLET ORAL at 18:10

## 2022-01-01 RX ADMIN — ACETAMINOPHEN 975 MG: 325 TABLET ORAL at 21:36

## 2022-01-01 RX ADMIN — LIDOCAINE HYDROCHLORIDE 100 MG: 20 INJECTION, SOLUTION INFILTRATION; PERINEURAL at 15:20

## 2022-01-01 RX ADMIN — Medication 50 MCG: at 08:04

## 2022-01-01 RX ADMIN — DROXIDOPA 200 MG: 100 CAPSULE ORAL at 12:46

## 2022-01-01 RX ADMIN — ROCURONIUM BROMIDE 10 MG: 50 INJECTION, SOLUTION INTRAVENOUS at 14:53

## 2022-01-01 RX ADMIN — ROCURONIUM BROMIDE 50 MG: 50 INJECTION, SOLUTION INTRAVENOUS at 14:05

## 2022-01-01 RX ADMIN — MIDODRINE HYDROCHLORIDE 15 MG: 5 TABLET ORAL at 15:42

## 2022-01-01 RX ADMIN — MIDODRINE HYDROCHLORIDE 15 MG: 5 TABLET ORAL at 22:03

## 2022-01-01 RX ADMIN — ACETAMINOPHEN 975 MG: 325 TABLET ORAL at 05:29

## 2022-01-01 RX ADMIN — ACETAMINOPHEN 975 MG: 325 TABLET ORAL at 13:11

## 2022-01-01 RX ADMIN — TRANEXAMIC ACID 1950 MG: 650 TABLET ORAL at 11:07

## 2022-01-01 RX ADMIN — OXYCODONE HYDROCHLORIDE 5 MG: 5 TABLET ORAL at 00:43

## 2022-01-01 RX ADMIN — DROXIDOPA 200 MG: 100 CAPSULE ORAL at 18:09

## 2022-01-01 RX ADMIN — Medication 3 HALF-TAB: at 08:28

## 2022-01-01 RX ADMIN — ACETAMINOPHEN 975 MG: 325 TABLET ORAL at 20:59

## 2022-01-01 RX ADMIN — CARBIDOPA 25 MG: 25 TABLET ORAL at 08:03

## 2022-01-01 RX ADMIN — CARBIDOPA AND LEVODOPA 1 TABLET: 25; 100 TABLET ORAL at 21:38

## 2022-01-01 ASSESSMENT — ACTIVITIES OF DAILY LIVING (ADL)
ADLS_ACUITY_SCORE: 8
ADLS_ACUITY_SCORE: 15
TOILETING: 1-->ASSISTANCE (EQUIPMENT/PERSON) NEEDED (NOT DEVELOPMENTALLY APPROPRIATE)
ADLS_ACUITY_SCORE: 15
ADLS_ACUITY_SCORE: 11
ADLS_ACUITY_SCORE: 15
ADLS_ACUITY_SCORE: 13
ADLS_ACUITY_SCORE: 14
ADLS_ACUITY_SCORE: 14
ADLS_ACUITY_SCORE: 15
ADLS_ACUITY_SCORE: 8
ADLS_ACUITY_SCORE: 15
TOILETING_ISSUES: NO
ADLS_ACUITY_SCORE: 14
ADLS_ACUITY_SCORE: 8
ADLS_ACUITY_SCORE: 10
ADLS_ACUITY_SCORE: 8
ADLS_ACUITY_SCORE: 14
DRESS: 1-->ASSISTANCE (EQUIPMENT/PERSON) NEEDED (NOT DEVELOPMENTALLY APPROPRIATE)
ADLS_ACUITY_SCORE: 11
ADLS_ACUITY_SCORE: 15
ADLS_ACUITY_SCORE: 14
ADLS_ACUITY_SCORE: 15
ADLS_ACUITY_SCORE: 12
ADLS_ACUITY_SCORE: 14
VISION_MANAGEMENT: GLASSES
ADLS_ACUITY_SCORE: 8
ADLS_ACUITY_SCORE: 15
ADLS_ACUITY_SCORE: 8
ADLS_ACUITY_SCORE: 15
ADLS_ACUITY_SCORE: 14
ADLS_ACUITY_SCORE: 15
CONCENTRATING,_REMEMBERING_OR_MAKING_DECISIONS_DIFFICULTY: NO
ADLS_ACUITY_SCORE: 8
ADLS_ACUITY_SCORE: 15
ADLS_ACUITY_SCORE: 14
ADLS_ACUITY_SCORE: 8
ADLS_ACUITY_SCORE: 14
ADLS_ACUITY_SCORE: 13
TRANSFERRING: 0-->ASSISTANCE NEEDED (DEVELOPMETNALLY APPROPRIATE)
ADLS_ACUITY_SCORE: 8
TOILETING: 1-->ASSISTANCE (EQUIPMENT/PERSON) NEEDED
ADLS_ACUITY_SCORE: 12
ADLS_ACUITY_SCORE: 15
ADLS_ACUITY_SCORE: 8
ADLS_ACUITY_SCORE: 8
ADLS_ACUITY_SCORE: 15
IADL_COMMENTS: WIFE COMPLETES
ADLS_ACUITY_SCORE: 14
ADLS_ACUITY_SCORE: 8
ADLS_ACUITY_SCORE: 8
DRESS: 1-->ASSISTANCE (EQUIPMENT/PERSON) NEEDED
ADLS_ACUITY_SCORE: 15
ADLS_ACUITY_SCORE: 8
ADLS_ACUITY_SCORE: 8
ADLS_ACUITY_SCORE: 13
ADLS_ACUITY_SCORE: 14
TRANSFERRING: 1-->ASSISTANCE (EQUIPMENT/PERSON) NEEDED
WALKING_OR_CLIMBING_STAIRS: AMBULATION DIFFICULTY, ASSISTANCE 1 PERSON
DRESSING/BATHING_DIFFICULTY: YES
WEAR_GLASSES_OR_BLIND: YES
ADLS_ACUITY_SCORE: 14
ADLS_ACUITY_SCORE: 8
ADLS_ACUITY_SCORE: 14
ADLS_ACUITY_SCORE: 15
ADLS_ACUITY_SCORE: 8
ADLS_ACUITY_SCORE: 8
ADLS_ACUITY_SCORE: 11
ADLS_ACUITY_SCORE: 14
ADLS_ACUITY_SCORE: 13
ADLS_ACUITY_SCORE: 13
ADLS_ACUITY_SCORE: 8
ADLS_ACUITY_SCORE: 15
ADLS_ACUITY_SCORE: 8
ADLS_ACUITY_SCORE: 14
ADLS_ACUITY_SCORE: 13
ADLS_ACUITY_SCORE: 8
WEAR_GLASSES_OR_BLIND: YES
DIFFICULTY_EATING/SWALLOWING: NO
DIFFICULTY_EATING/SWALLOWING: NO
ADLS_ACUITY_SCORE: 14
ADLS_ACUITY_SCORE: 8
ADLS_ACUITY_SCORE: 15
ADLS_ACUITY_SCORE: 8
ADLS_ACUITY_SCORE: 8
WALKING_OR_CLIMBING_STAIRS_DIFFICULTY: YES
ADLS_ACUITY_SCORE: 15
ADLS_ACUITY_SCORE: 8
ADLS_ACUITY_SCORE: 15
ADLS_ACUITY_SCORE: 8
ADLS_ACUITY_SCORE: 15
ADLS_ACUITY_SCORE: 14
ADLS_ACUITY_SCORE: 14
ADLS_ACUITY_SCORE: 12
ADLS_ACUITY_SCORE: 8
ADLS_ACUITY_SCORE: 14
ADLS_ACUITY_SCORE: 8
ADLS_ACUITY_SCORE: 15
ADLS_ACUITY_SCORE: 8
ADLS_ACUITY_SCORE: 14
ADLS_ACUITY_SCORE: 15
ADLS_ACUITY_SCORE: 14
ADLS_ACUITY_SCORE: 8
ADLS_ACUITY_SCORE: 15
ADLS_ACUITY_SCORE: 15
ADLS_ACUITY_SCORE: 11
ADLS_ACUITY_SCORE: 14
ADLS_ACUITY_SCORE: 15
ADLS_ACUITY_SCORE: 14
DRESSING/BATHING: BATHING DIFFICULTY, ASSISTANCE 1 PERSON
ADLS_ACUITY_SCORE: 8
ADLS_ACUITY_SCORE: 11
ADLS_ACUITY_SCORE: 14
ADLS_ACUITY_SCORE: 14
ADLS_ACUITY_SCORE: 8
ADLS_ACUITY_SCORE: 14
ADLS_ACUITY_SCORE: 11
ADLS_ACUITY_SCORE: 8
ADLS_ACUITY_SCORE: 13
ADLS_ACUITY_SCORE: 13
ADLS_ACUITY_SCORE: 15
ADLS_ACUITY_SCORE: 14
ADLS_ACUITY_SCORE: 8
ADLS_ACUITY_SCORE: 15
ADLS_ACUITY_SCORE: 13
CHANGE_IN_FUNCTIONAL_STATUS_SINCE_ONSET_OF_CURRENT_ILLNESS/INJURY: YES
ADLS_ACUITY_SCORE: 14
BATHING: 1-->ASSISTANCE NEEDED
ADLS_ACUITY_SCORE: 8
ADLS_ACUITY_SCORE: 15
ADLS_ACUITY_SCORE: 8
ADLS_ACUITY_SCORE: 15
ADLS_ACUITY_SCORE: 14
ADLS_ACUITY_SCORE: 15
ADLS_ACUITY_SCORE: 8
ADLS_ACUITY_SCORE: 14
ADLS_ACUITY_SCORE: 8
ADLS_ACUITY_SCORE: 11
CONCENTRATING,_REMEMBERING_OR_MAKING_DECISIONS_DIFFICULTY: NO
ADLS_ACUITY_SCORE: 8
ADLS_ACUITY_SCORE: 14
ADLS_ACUITY_SCORE: 15
ADLS_ACUITY_SCORE: 10
ADLS_ACUITY_SCORE: 14
DOING_ERRANDS_INDEPENDENTLY_DIFFICULTY: NO
ADLS_ACUITY_SCORE: 14
FALL_HISTORY_WITHIN_LAST_SIX_MONTHS: NO
ADLS_ACUITY_SCORE: 15
ADLS_ACUITY_SCORE: 15
ADLS_ACUITY_SCORE: 8
ADLS_ACUITY_SCORE: 11
ADLS_ACUITY_SCORE: 14
ADLS_ACUITY_SCORE: 8
ADLS_ACUITY_SCORE: 15
ADLS_ACUITY_SCORE: 8
ADLS_ACUITY_SCORE: 11
VISION_MANAGEMENT: GLASSES

## 2022-01-01 ASSESSMENT — ENCOUNTER SYMPTOMS
BACK PAIN: 0
VOMITING: 0
NAUSEA: 0
FREQUENCY: 1
SEIZURES: 0
ABDOMINAL PAIN: 1
DYSURIA: 1
DYSRHYTHMIAS: 1

## 2022-01-13 NOTE — TELEPHONE ENCOUNTER
I called pt spouse Lola to discuss scheduling Meir with one of our headache providers. Dr. Sun recommended he be seen with our clinic. Dr. Esqueda said we could add Meir before a Thursday clinic. Pt has a few appointments the next few thursdays. They will see if one could be moved to accommodate a visit with us.     She will call back. She said if Thursdays do not work they were wondering if Dr. Xavier would be able to see him. I let her know I can ask if we can not make Thursday work.     Cynthia ARROYO

## 2022-02-21 PROBLEM — S72.002A FRACTURE OF FEMORAL NECK, LEFT, CLOSED (H): Status: ACTIVE | Noted: 2022-01-01

## 2022-02-21 NOTE — PROGRESS NOTES
Ortho treatment plan    Patient with incomplete femoral neck fracture, plan or OR tomorrow with Dr. Basilio    May have diet tonight  NPO   Bed rest  Prefer pure wick over melgar if at all possible  Pain medication as needed, limit narcotics as able  Hold any anticoagulation  STAT COVID test  Type and cross  Vit D lab  Admission orders placed  Pre-op optimization per hospitalist    Kerrie Rahman PAC

## 2022-02-22 NOTE — ANESTHESIA PREPROCEDURE EVALUATION
Anesthesia Pre-Procedure Evaluation    Patient: Meir Richardson   MRN: [id number] : 1950        Procedure : Procedure(s):  PERCUTANEOUS PINNING, RIGHT HIP.          Past Medical History:   Diagnosis Date     Behcet's syndrome (H)     diagnosed . Resolved     Benign essential tremor      Irregular heart beat     PVC's     Migraine      Parkinsons disease (H)      Paroxysmal atrial tachycardia (H)     s/p ablation     PONV (postoperative nausea and vomiting)      Scapular dyskinesis      Syncope      Vitamin B 12 deficiency       Past Surgical History:   Procedure Laterality Date     AV NODE ABLATION  2018    for atrial tachycardia     CARDIAC SURGERY      ablation     COLONOSCOPY       HAND SURGERY Right 2020     HERNIA REPAIR       OPEN REDUCTION INTERNAL FIXATION TOE(S) Right 2017    Procedure: OPEN REDUCTION INTERNAL FIXATION TOE(S);  RIGHT FIRST MTP FUSION, FOREFOOR RECONSTRUCTION;  Surgeon: Yfn Ulloa MD;  Location: [facility] OR      Allergies   Allergen Reactions     Erythromycin Hives     Other reaction(s): Angioedema  Tolerated azithromycin.        Dronedarone Other (See Comments)      shakes, difficulty sleeping and high anxiety.   shakes, difficulty sleeping and high anxiety.   shakes, difficulty sleeping and high anxiety.       Flecainide Other (See Comments)     Other reaction(s): Tremors  Reported after 2nd dose also mentioned concurrent migraine (has a hx of migraines prior to flecainide) TAMIKO RN   Reported after 2nd dose also mentioned concurrent migraine (has a hx of migraines prior to flecainide) TAMIKO RN   Reported after 2nd dose also mentioned concurrent migraine (has a hx of migraines prior to flecainide) CRUZ MORRISON        Keflex [Cephalexin] Rash     Penicillins Rash     Sulfa Drugs Rash     Tetracycline Rash      Social History     Tobacco Use     Smoking status: Never Smoker     Smokeless tobacco: Never Used   Substance Use Topics     Alcohol use: No      Wt Readings from Last  1 Encounters:   05/11/17 77.4 kg (170 lb 11.2 oz)        Anesthesia Evaluation   Pt has had prior anesthetic.     History of anesthetic complications  - PONV.      ROS/MED HX  ENT/Pulmonary:  - neg pulmonary ROS     Neurologic: Comment: Tremor    (+) Parkinson's disease,     Cardiovascular: Comment: Irregular heart beat PVC's  hx PAT s/p ablation.  Orthostatic hypotension with recurrent syncope.    Echo:  Final Impressions:    1. Normal LV size, normal wall thickness, normal function with an estimated EF of 65 - 70%.    2. Right ventricular cavity size is normal, global systolic RV function is normal.    3. No significant valve disease detected.    4. Normal estimated PA pressure.    5. IVC geometry suggests normal to low CVP.        (+) -----fainting (syncope). 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 Resolved.  Scapular dyskinesis.  Incomplete femoral neck fracture, nontraumatic.      GI/Hepatic:       Renal/Genitourinary: Comment: Cr. 1.34    (+) renal disease, type: CRI,     Endo: Comment: Vit. B12      Psychiatric/Substance Use:  - neg psychiatric ROS     Infectious Disease:  - neg infectious disease ROS     Malignancy:  - neg malignancy ROS     Other:  - neg other ROS          Physical Exam    Airway  airway exam normal      Mallampati: II   TM distance: > 3 FB   Neck ROM: full   Mouth opening: > 3 cm    Respiratory Devices and Support         Dental  no notable dental history         Cardiovascular   cardiovascular exam normal          Pulmonary   pulmonary exam normal                OUTSIDE LABS:  CBC:   Lab Results   Component Value Date    WBC 3.1 (L) 02/21/2022    HGB 10.3 (L) 02/21/2022    HCT 31.6 (L) 02/21/2022     02/21/2022     BMP:   Lab Results   Component Value Date     02/21/2022    POTASSIUM 4.2 02/21/2022    CHLORIDE 103 02/21/2022    CO2 30  02/21/2022    BUN 32 (H) 02/21/2022    CR 1.34 (H) 02/21/2022     (H) 02/21/2022     COAGS:   Lab Results   Component Value Date    INR 1.04 02/21/2022     POC:   Lab Results   Component Value Date    BGM 99 05/12/2017     HEPATIC: No results found for: ALBUMIN, PROTTOTAL, ALT, AST, GGT, ALKPHOS, BILITOTAL, BILIDIRECT, ROSELIA  OTHER:   Lab Results   Component Value Date    SAMUEL 9.2 02/21/2022       Anesthesia Plan    ASA Status:  3      Anesthesia Type: General.     - Airway: ETT   Induction: Intravenous.   Maintenance: Balanced.        Consents    Anesthesia Plan(s) and associated risks, benefits, and realistic alternatives discussed. Questions answered and patient/representative(s) expressed understanding.    - Discussed:     - Discussed with:  Patient         Postoperative Care    Pain management: IV analgesics, Multi-modal analgesia.   PONV prophylaxis: Ondansetron (or other 5HT-3)     Comments:                Nicanor Calhoun MD

## 2022-02-22 NOTE — OP NOTE
Procedure Date: 02/22/2022    PREOPERATIVE DIAGNOSIS:  Right femoral neck fracture.    POSTOPERATIVE DIAGNOSIS:  Right femoral neck fracture.    PROCEDURE PERFORMED:  Open reduction and internal fixation, right femoral neck fracture.    SURGEON:  Lito Basilio MD    ASSISTANTS:  Ronak De La Fuente PA-C, whose assistance was critical for positioning, retraction during exposure, fixation and closure.    ANESTHETIC:  General.    ESTIMATED BLOOD LOSS:  20 mL.    FINDINGS:  Minimally displaced femoral neck fracture.    IMPLANTS USED:  Synthes FNS system 105 mm.    INDICATIONS FOR PROCEDURE:  A 71-year-old man who had increasing pain in the right groin with weightbearing.  X-rays unremarkable.  CT showed subtle left femoral neck fracture.  Risks, benefits, alternatives explained including operative versus nonoperative treatment.  Risks of his condition and surgery discussed included but not limited to bleeding, infection, damage to surrounding neurovascular structures, avascular necrosis, cutout, need for conversion to a total hip arthroplasty, blood clots that go to the heart, lung, or brain, anesthetic complications, and even death.  He understands and wished to proceed.  Consent signed.    DESCRIPTION OF PROCEDURE:  The patient was identified in preoperative holding area per hospital policy, correct op site marked, to the OR and to the OR table.  General anesthesia induced, placed on the [place] table.  Chlorhexidine prescrub, ChloraPrep, draped.  A timeout performed, all in the room agreed.  Localized incision site with fluoroscopy.  Small lateral incision, dissecting through skin and subcutaneous tissue, IT band, lifted the vastus anteriorly, cleared a spot on the femur laterally, placed slightly a low center pin under fluoroscopy confirming position on AP, oblique and lateral views.  Measured for 112, set reamer for 110 and opened 105 mm FNS system.  This was impacted and sat at the desired depth.  I placed a 105 mm  locking screw and then two 40 mm locking screws in the shaft, pushing down on the plate to bring it was close to the bone as possible.  Only locking screws available for the shaft.  AP, lateral, oblique x-rays showed appropriate reduction, safe position of the implants.  Copiously irrigated the wound, closed in layered fashion.  IT band, 2-0 Vicryl, 4-0 Monocryl, glue.  Sterile dressings applied.  Awakened transferred stable to PACU.    POSTOPERATIVE PLAN:  1.  Weightbearing as tolerated with a walker x6 weeks.  2.  DVT prophylaxis:  Aspirin enteric coated 325 mg daily.  3.  Vitamin D 2000 units daily.  4.  Follow up with the bone health team for osteoporosis workup.  5.  Follow up in my clinic could be at 6 weeks.  6.  Pull off bandage in 2 weeks.    Lito Basilio MD        D: 2022   T: 2022   MT: [id number]    Name:     DUARTE TENA  MRN:      [id number]        Account:        [id number]   :      1950           Procedure Date: 2022     Document: [id number]

## 2022-02-22 NOTE — ANESTHESIA CARE TRANSFER NOTE
Patient: Meir Richardson    Procedure: Procedure(s):  OPEN REDUCTION INTERNAL FIXATION, RIGHT HIP.       Diagnosis: Hip fracture (H) [S72.009A]  Diagnosis Additional Information: No value filed.    Anesthesia Type:   General     Note:    Oropharynx: oropharynx clear of all foreign objects and spontaneously breathing  Level of Consciousness: awake  Oxygen Supplementation: face mask  Level of Supplemental Oxygen (L/min / FiO2): 6  Independent Airway: airway patency satisfactory and stable  Dentition: dentition unchanged  Vital Signs Stable: post-procedure vital signs reviewed and stable  Report to RN Given: handoff report given  Patient transferred to: PACU  Comments: Neuromuscular blockade reversed after TOF 4/4, spontaneous respirations, adequate tidal volumes, followed commands to voice, oropharynx suctioned with soft flexible catheter, extubated atraumatically, extubated with suction, airway patent after extubation.  Oxygen via facemask at 6 liters per minute to PACU. Oxygen tubing connected to wall O2 in PACU, SpO2, NiBP, and EKG monitors and alarms on and functioning, Cortes Hugger warmer connected to patient gown, report on patient's clinical status given to PACU RN, RN questions answered.     Handoff Report: Identifed the Patient, Identified the Reponsible Provider, Reviewed the pertinent medical history, Discussed the surgical course, Reviewed Intra-OP anesthesia mangement and issues during anesthesia, Set expectations for post-procedure period and Allowed opportunity for questions and acknowledgement of understanding      Vitals:  Vitals Value Taken Time   /84 02/22/22 1633   Temp     Pulse 85 02/22/22 1638   Resp 10 02/22/22 1638   SpO2 100 % 02/22/22 1638   Vitals shown include unvalidated device data.    Electronically Signed By: MANUEL Arthur CRNA  February 22, 2022  4:39 PM

## 2022-02-22 NOTE — PROGRESS NOTES
[facility]    Hospitalist Progress Note    Date of Admission:  2/21/2022    Assessment & Plan     Meir Richardson is a 71 year old adult with PMH significant for Behcets disease, parkinson's disease, paroxysmal atrial tachycardia s/p ablation, CKD, orthostatic hypotension with recurrent syncope who was directly admitted on 2/21/22 by referral of orthopedics for nontraumatic incomplete femoral neck fracture requiring repair.      Incomplete right femoral neck fracture, nontraumatic, likely pathologic/insufficiency fracture  Hx Behcets disease and required steroids >40 years ago in the 1970s. He reports recently had a taper of steroids for prolonged headache. He was seen in [facility] 2/20 for acute onset hip pain/groin pain which presented abruptly with standing. No falls or trauma. CT day of admission showed incomplete femoral neck fracture. [facility] team is planning for OR 2/22 for percutaneous pin of R hip. [facility] thinks etiology is insufficiency fracture. Of note, he had a whole body scan 1 year ago for history of prostate cancer and showed no metastases.   - Admit to inpatient  - Orthopedics surgery consultation  - Bed rest, purewick as needed  - Hold NSAIDs  - Pain control   - Basic labs and EKG reviewed-medicated for surgery  - NPO till surgery  - MIVF while NPO given profound orthostatic hypotension     Preoperative assessment  Significant history: older age, never smoker, non drinker and no drugs, he has baseline CKD which is stable, hx PAT s/p ablation, parkinsons, orthostasis  Personal or family history of anesthesia complications: reports N/V after colonoscopy  Cardiac History: heart disease - no ischemia, PAT s/p ablation, no significant valvular, ischemia or cardiomyopathy. Hx orthostasis, stable CKD  Symptoms: dyspnea with ambulation and stairs however able to ride stationary bike for 30-45 minutes, recurrent syncope/presyncope due to orthostatic hypotension  Preoperative Labs:  Baseline  at Cr and Hgb 10.3   Baseline EKG: ordered and reviewed  CXR: not indicated  Last ECHO: 8/2021: EF 65-70%  Recent coronary CTA with calcium score of 1  Current Cardiac Status: Moderate exertional capacity 4-10 METs with stationary bike only  -At acceptable risk, medically cleared for orthopedic surgery.     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 On midodrine. Follows with PCP and cardiology closely as well as neurology. Presyncope/syncope last 1 month ago. Lightheaded with ambulation and especially stairs.   * Receives 2L NS at infusion clinic Mondays, Wednesdays, and Fridays (Fri he has 1 L NS and 1L MVI)  - MIVF  - Continue PTA regimen including midodrine, droxidopa, Lodosyn, Sinemet  - Monitor closely  - Compression stockings postop as able     Hx PAT s/p ablation  Follows at [facility].   Last ECHO: 8/2021: EF 65-70%  Recent coronary CTA with calcium score of 1  - Telemetry  - F/u with cardiology as an outpatient     Parkinsons disease  Follows closely with neurology.  - Resume PTA regimen including Lodosyn, Sinemet, droxidopa     Chronic kidney disease, stage 3a  Baseline Cr 1.3-1.5. On admission, at baseline 1.34.  - Avoid nephrotoxins - contrast, NSAIDs  - Renally dose medications as able/needed  - Monitor     Anemia   Baseline appears 10-11 range.   - Monitor         Recent Labs   Lab 02/21/22  2149   HGB 10.3*         Hx Behcets disease  Historically with lesions to eyes, mouth, penile region. Currently inactive, over 40 years ago required long term steroids but has not for many years.      Other pertinent history and chronic stable diagnoses: Appropriate prior to admission medications will be resumed.   Vitamin B12 deficiency  Benign essential tremor  Migraines  Prostate cancer     Asymptomatic COVID-19 admission screening PCR:  Negative at  admission  - COVID-19 vaccination status: Fully vaccinated and boosted with Pfizer.  - Influenza vaccination status: 11/2021        Diet: NPO per Anesthesia Guidelines for Procedure/Surgery Except for: Meds  Combination Diet Regular Diet Adult  DVT Prophylaxis: Defer to Ortho  Ocker Catheter: Not present     Code Status: No CPR- Do NOT Intubate - confirmed with patient on admission              Disposition Plan     Expected Discharge:  Likely in 2 to 3 days pending postoperative course, likely will need TCU placement.  Anticipated discharge location:  Awaiting care coordination huddle  Delays: Surgery         The patient's care was discussed with the Patient and his wife.          Anjelica Quijano MD  Text Page (7am - 6pm, M-F)  [facility]  Securely message with the Vocera Web Console (learn more here)  Text page via [employer] Paging/Directory      Interval History   Stable overnight.  Admits to ongoing pain in right hip.  Was talking on computer with his wife.  No chest pain, shortness of breath, dizziness, palpitations.    -Data reviewed today: I reviewed all new labs and imaging results over the last 24 hours. I personally reviewed EKG with result as noted above and x-rays.    Physical Exam   Temp: 98.5  F (36.9  C) Temp src: Oral BP: (!) 111/96 Pulse: 96   Resp: 10 SpO2: 96 % O2 Device: None (Room air)    There were no vitals filed for this visit.  Vital Signs with Ranges  Temp:  [98.2  F (36.8  C)-98.5  F (36.9  C)] 98.5  F (36.9  C)  Pulse:  [92-96] 96  Resp:  [10-18] 10  BP: (111-120)/(82-96) 111/96  SpO2:  [96 %-98 %] 96 %  No intake/output data recorded.    Constitutional: Alert, appears comfortable, in no acute distress, resting tremors of upper and lower extremities noted  Respiratory: Non labored breathing, clear to auscultation bilaterally, no crackles or wheezes  Cardiovascular: Heart sounds regular rate and rhythm, no murmurs, no leg edema  GI: Abdomen is soft, non distended, non  tender. Normal BS  Neuro: alert, converses appropriately, masked facies noted, fluent speech, no facial asymmetry  Psych: mood and affect appropriate      Medications     lactated ringers 25 mL/hr at 02/22/22 1350     sodium chloride 100 mL/hr at 02/22/22 0939       [Auto Hold] carbidopa  25 mg Oral TID     [Auto Hold] carbidopa-levodopa  3 half-tab Oral TID     [Auto Hold] citalopram  20 mg Oral Daily     clindamycin  900 mg Intravenous Pre-Op/Pre-procedure x 1 dose     [Auto Hold] cyclobenzaprine  10 mg Oral Daily     [Auto Hold] droxidopa  200 mg Oral TID     [Auto Hold] midodrine  15 mg Oral TID     [Auto Hold] sennosides  1 tablet Oral BID     sodium chloride (PF)  3 mL Intracatheter Q8H     [Auto Hold] sodium chloride (PF)  3 mL Intracatheter Q8H     tranexamic acid  1 g Intravenous Once       Data   Recent Labs   Lab 02/21/22  2149   WBC 3.1*   HGB 10.3*         INR 1.04      POTASSIUM 4.2   CHLORIDE 103   CO2 30   BUN 32*   CR 1.34*   ANIONGAP 2*   SAMUEL 9.2   *       Imaging  No results found for this or any previous visit (from the past 24 hour(s)).

## 2022-02-22 NOTE — H&P
[facility]    History and Physical - Hospitalist Service       Date of Admission:  2/21/2022    Assessment & Plan   Meir Richardson is a 71 year old adult with PMH significant for Behcets disease, parkinson's disease, paroxysmal atrial tachycardia s/p ablation, CKD, orthostatic hypotension with recurrent syncope who was directly admitted on 2/21/22 by referral of orthopedics for nontraumatic incomplete femoral neck fracture requiring repair.     Incomplete femoral neck fracture, nontraumatic  Hx Behcets disease and required steroids >40 years ago in the 1970s. He reports recently had a taper of steroids for prolonged headache. [facility] was [facility] in [facility] for acute onset hip pain/groin pain which presented abruptly with standing. No falls or trauma. CT day of admission showed incomplete femoral neck fracture. [facility] team is planning for OR 2/22 for percutaneous pin of R hip. [facility] thinks etiology is insufficiency fracture. Of note, he had a whole body scan 1 year ago for history of prostate cancer and showed no metastases.   - Admit to inpatient  - Orthopedics surgery consultation  - Bed rest, purewick as needed  - Hold NSAIDs  - Pain control   - Basic labs and EKG ordered  - NPO after midnight  - MIVF while NPO given profound orthostatic hypotension    Preoperative assessment  Significant history: older age, never smoker, non drinker and no drugs, he has baseline CKD which is stable, hx PAT s/p ablation, parkinsons, orthostasis  Personal or family history of anesthesia complications: reports N/V after colonoscopy  Cardiac History: heart disease - no ischemia, PAT s/p ablation, no significant valvular, ischemia or cardiomyopathy. Hx orthostasis, stable CKD  Symptoms: dyspnea with ambulation and stairs however able to ride stationary bike for 30-45 minutes, recurrent syncope/presyncope due to orthostatic hypotension  Preoperative Labs:  Baseline at Cr and Hgb 10.3   Baseline EKG: ordered.  CXR: not  indicated  Last ECHO: 8/2021: EF 65-70%  Recent coronary CTA with calcium score of 1  Current Cardiac Status: Moderate exertional capacity 4-10 METs with stationary bike only  - Preoperative morality predictor score: 0.1%   - Risk of CV Complication (RCRI): 3.9% 30-day risk of death, MI, or cardiac arrest. Class I Risk.  - COVID-19 screening: ordered  - Recommend obtaining EKG prior to surgery  - May proceed with surgery without further medical testing and intervention once EKG has resulted and been reviewed by Hospitalist    Orthostatic hypotension  Hx recurrent syncope  Profound orthostatic hypotension, thought to be worsened by autonomic dysfunction from parkinsons. On midodrine. Follows with PCP and cardiology closely as well as neurology. Presyncope/syncope last 1 month ago. Lightheaded with ambulation and especially stairs.   * Receives 2L NS at infusion clinic Mondays, Wednesdays, and Fridays (Fri he has 1 L NS and 1L MVI)  - MIVF  - Continue PTA regimen including midodrine, droxidopa  - Monitor closely  - Compression stockings postop as able    Hx PAT s/p ablation  Follows at [facility].   Last ECHO: 8/2021: EF 65-70%  Recent coronary CTA with calcium score of 1  - Telemetry  - F/u with cardiology as an outpatient    Parkinsons disease  Follows closely with neurology.  - Resume PTA regimen including Lodosyn, Sinemet    Chronic kidney disease, stage 3a  Baseline Cr 1.3-1.5. On admission, at baseline 1.34.  - Avoid nephrotoxins - contrast, NSAIDs  - Renally dose medications as able/needed  - Monitor    Anemia   Baseline appears 10-11 range.   - Monitor    Recent Labs   Lab 02/21/22  2149   HGB 10.3*       Hx Behcets disease  Historically with lesions to eyes, mouth, penile region. Currently inactive, over 40 years ago required long term steroids but has not for many years.     Other pertinent history and chronic stable diagnoses: Appropriate prior to admission medications will be resumed.   Vitamin  B12 deficiency  Benign essential tremor  Migraines  Prostate cancer    Asymptomatic COVID-19 admission screening PCR: Swab ordered.  - COVID-19 vaccination status: Fully vaccinated and boosted with Pfizer.  - Influenza vaccination status: 11/2021      Diet: NPO per Anesthesia Guidelines for Procedure/Surgery Except for: Meds  Combination Diet Regular Diet Adult  DVT Prophylaxis: Defer to Ortho  Coker Catheter: Not present    Code Status: No CPR- Do NOT Intubate - confirmed with patient on admission      Disposition Plan   Expected Discharge: TBD  Anticipated discharge location:  Awaiting care coordination huddle  Delays: Surgery     Entered: Jenny Cespedes PA-C 02/21/2022, 10:51 PM     The patient's care was discussed with the Attending Physician, Dr. Damon, Bedside Nurse and Patient.      PRUDENCIO Allen PA-C  Hospitalist SHILO  [facility]  Securely message with the Vocera Web Console (learn more here)  Text page via [employer] Paging/Directory  ______________________________________________________________________    Chief Complaint   Right hip pain    History is obtained from the patient, electronic health record and [facility].    History of Present Illness   Meir Richardson is a 71 year old adult with PMH significant for Behcets disease, parkinson's disease, paroxysmal atrial tachycardia s/p ablation, CKD, orthostatic hypotension with recurrent syncope who was directly admitted by referral of orthopedics for incomplete femoral neck fracture requiring repair. The patient with hx Behcets disease and required steroids >40 years ago in the 1970s. He reports recently had a taper of steroids for prolonged headache. He was seen in [facility]  yesterday for acute onset hip pain/groin pain which presented abruptly with standing. No falls or trauma. CT day of admission showed incomplete femoral neck fracture. [facility] team is planning for OR tomorrow for percutaneous pin of R hip. [facility] thinks  etiology is insufficiency fracture. Of note, he had a whole body scan 1 year ago for history of prostate cancer and showed no metastases.     Review of Systems    The 10 point Review of Systems is negative other than noted in the HPI or here.     Past Medical History    I have reviewed this patient's medical history and updated it with pertinent information if needed.   Past Medical History:   Diagnosis Date     Behcet's syndrome (H)     diagnosed 1970's. Resolved     Benign essential tremor      Irregular heart beat     PVC's     Migraine      Parkinsons disease (H)      Paroxysmal atrial tachycardia (H)     s/p ablation     PONV (postoperative nausea and vomiting)      Scapular dyskinesis      Syncope      Vitamin B 12 deficiency        Past Surgical History   I have reviewed this patient's surgical history and updated it with pertinent information if needed.  Past Surgical History:   Procedure Laterality Date     AV NODE ABLATION  2018    for atrial tachycardia     CARDIAC SURGERY      ablation     COLONOSCOPY       HAND SURGERY Right 09/2020     HERNIA REPAIR       OPEN REDUCTION INTERNAL FIXATION TOE(S) Right 05/11/2017    Procedure: OPEN REDUCTION INTERNAL FIXATION TOE(S);  RIGHT FIRST MTP FUSION, FOREFOOR RECONSTRUCTION;  Surgeon: Yfn Ulloa MD;  Location:  OR       Social History   I have reviewed this patient's social history and updated it with pertinent information if needed.  Social History     Tobacco Use     Smoking status: Never Smoker     Smokeless tobacco: Never Used   Vaping Use     Vaping Use: Never used   Substance Use Topics     Alcohol use: No     Drug use: No       Family History   I have reviewed this patient's family history and updated it with pertinent information if needed.   No family history on file.    Prior to Admission Medications   Prior to Admission Medications   Prescriptions Last Dose Informant Patient Reported? Taking?   Probiotic Product (PROBIOTIC DAILY PO) 2/20/2022  at Unknown time Self Yes Yes   Sig: Take 1 capsule by mouth every evening    SUMAtriptan Succinate (IMITREX PO) Past Week at Unknown time Self Yes Yes   Sig: Take 50 mg by mouth daily as needed for migraine    SUMAtriptan Succinate Refill (IMITREX) 6 MG/0.5ML SOCT More than a month at Unknown time Self Yes Yes   Sig: Inject 6 mg Subcutaneous daily as needed for migraine    acetaminophen (TYLENOL) 325 MG tablet More than a month at prn  No Yes   Sig: Take 2 tablets (650 mg) by mouth every 6 hours as needed for mild pain   carbidopa (LODOSYN) 25 MG TABS tablet 2/21/2022 at 1800  Yes Yes   Sig: Take 25 mg by mouth 3 times daily   carbidopa-levodopa (SINEMET)  MG per tablet 2/21/2022 at 1800 Self Yes Yes   Sig: Take 1.5 tablets by mouth 3 times daily With food   citalopram (CELEXA) 20 MG tablet 2/21/2022 at Unknown time  Yes Yes   Sig: Take 20 mg by mouth daily   cyclobenzaprine (FLEXERIL) 10 MG tablet 2/21/2022 at Unknown time  Yes Yes   Sig: Take 10 mg by mouth daily   droxidopa (NORTHERA) 100 MG capsule 2/21/2022 at 1800  Yes Yes   Sig: Take 200 mg by mouth 3 times daily   ergotamine-caffeine (CAFERGOT) 1-100 MG tablet More than a month at Unknown time  Yes Yes   Sig: Take 2 tablets by mouth daily as needed for headaches or migraine Two tablets at onset of attack; then 1 tablet every 30 minutes as needed; maximum: 6 tablets per attack; do not exceed 10 tablets/week.   ibuprofen (ADVIL/MOTRIN) 600 MG tablet More than a month at prn  No Yes   Sig: Take 1 tablet (600 mg) by mouth every 6 hours as needed for other (inflammatory pain)   isometheptene-dichloralphenazone-acetaminophen (MIDRIN) -325 MG per capsule More than a month at prn Self Yes Yes   Sig: Take 1 capsule by mouth 4 times daily as needed for headaches   midodrine (PROAMATINE) 5 MG tablet 2/21/2022 at 1800  Yes Yes   Sig: Take 15 mg by mouth 3 times daily   order for DME   No No   Sig: ADJUSTABLE LIGHTWEIGHT WALKER   oxyCODONE-acetaminophen  (PERCOCET) 5-325 MG per tablet  at prn Self Yes Yes   Sig: Take 1 tablet by mouth every 4 hours as needed for moderate to severe pain      Facility-Administered Medications: None     Allergies   Allergies   Allergen Reactions     Erythromycin Hives     Other reaction(s): Angioedema  Tolerated azithromycin.        Dronedarone Other (See Comments)      shakes, difficulty sleeping and high anxiety.   shakes, difficulty sleeping and high anxiety.   shakes, difficulty sleeping and high anxiety.       Flecainide Other (See Comments)     Other reaction(s): Tremors  Reported after 2nd dose also mentioned concurrent migraine (has a hx of migraines prior to flecainide) TAMIKO, RN   Reported after 2nd dose also mentioned concurrent migraine (has a hx of migraines prior to flecainide) TAMIKO RN   Reported after 2nd dose also mentioned concurrent migraine (has a hx of migraines prior to flecainide) TAMIKO RN        Keflex [Cephalexin] Rash     Penicillins Rash     Sulfa Drugs Rash     Tetracycline Rash       Physical Exam   Vital Signs: Temp: 98.4  F (36.9  C) Temp src: Oral BP: 120/82 Pulse: 96   Resp: 16 SpO2: 98 % O2 Device: None (Room air)    Weight: 0 lbs 0 oz    Meir H Dylan was evaluated during a global COVID-19 pandemic, which necessitated consideration that the patient might be at risk for infection with the SARS-CoV-2 virus that causes COVID-19. Applicable protocols for evaluation were followed during the patient's care. COVID-19 was considered as part of the patient's evaluation.       General: Awake, alert, older gentleman who appears stated age. Looks comfortable sitting up in bed. No acute distress.  HEENT: Normocephalic, atraumatic. Extraocular movements intact.   Respiratory: Clear to auscultation bilaterally, no rales, wheezing, or rhonchi.  Cardiovascular: Regular rate and rhythm, +S1 and S2, no murmur auscultated. No peripheral edema.   Gastrointestinal: Soft, non-tender, non-distended. Bowel sounds present.  Skin:  Warm, dry. No obvious rashes or lesions on exposed skin. Dorsalis pedis pulses palpable bilaterally.  Musculoskeletal: limited dorsi/plantar flexion due to Rt hip pain, did not attempt hip flexion  Neurologic: AAO x3. Cranial nerves 2-12 grossly intact, normal strength and sensation.  Psychiatric: Appropriate mood and affect. No obvious anxiety or depression.    Data   Data reviewed today: I reviewed all medications, new labs and imaging results over the last 24 hours. I personally reviewed no images or EKG's today.    Recent Labs   Lab 02/21/22  2149   WBC 3.1*   HGB 10.3*         INR 1.04      POTASSIUM 4.2   CHLORIDE 103   CO2 30   BUN 32*   CR 1.34*   ANIONGAP 2*   SAMUEL 9.2   *     No results found for this or any previous visit (from the past 24 hour(s)).

## 2022-02-22 NOTE — PHARMACY-ADMISSION MEDICATION HISTORY
Pharmacy Medication History  Admission medication history interview status for the 2/21/2022  admission is complete. See EPIC admission navigator for prior to admission medications     Location of Interview: Phone  Medication history sources: Patient, Surescripts and list provided by pt    Significant changes made to the medication list:  -Deleted amitriptyline, aspirin, baclofen, and propranolol; pt not taking these.  -Added midodrine, carbidopa, citalopram, flexeril, droxidopa, ergotamine-caffeine.  -Changed sinemet from 1 tablet tid to 1.5 tabs tid; imitrex 100 mg to 50 mg.     In the past week, patient estimated taking medication this percent of the time: greater than 90%    Additional medication history information:       Medication reconciliation completed by provider prior to medication history? Yes    Time spent in this activity: 35 min    Prior to Admission medications    Medication Sig Last Dose Taking? Auth Provider   acetaminophen (TYLENOL) 325 MG tablet Take 2 tablets (650 mg) by mouth every 6 hours as needed for mild pain More than a month at prn Yes Yfn Ulloa MD   carbidopa (LODOSYN) 25 MG TABS tablet Take 25 mg by mouth 3 times daily 2/21/2022 at 1800 Yes Unknown, Entered By History   carbidopa-levodopa (SINEMET)  MG per tablet Take 1.5 tablets by mouth 3 times daily With food 2/21/2022 at 1800 Yes Reported, Patient   citalopram (CELEXA) 20 MG tablet Take 20 mg by mouth daily 2/21/2022 at Unknown time Yes Unknown, Entered By History   cyclobenzaprine (FLEXERIL) 10 MG tablet Take 10 mg by mouth daily 2/21/2022 at Unknown time Yes Unknown, Entered By History   droxidopa (NORTHERA) 100 MG capsule Take 200 mg by mouth 3 times daily 2/21/2022 at 1800 Yes Unknown, Entered By History   ergotamine-caffeine (CAFERGOT) 1-100 MG tablet Take 2 tablets by mouth daily as needed for headaches or migraine Two tablets at onset of attack; then 1 tablet every 30 minutes as needed; maximum: 6 tablets per  attack; do not exceed 10 tablets/week. More than a month at Unknown time Yes Unknown, Entered By History   ibuprofen (ADVIL/MOTRIN) 600 MG tablet Take 1 tablet (600 mg) by mouth every 6 hours as needed for other (inflammatory pain) More than a month at prn Yes Yfn Ulloa MD   isometheptene-dichloralphenazone-acetaminophen (MIDRIN) -325 MG per capsule Take 1 capsule by mouth 4 times daily as needed for headaches More than a month at prn Yes Reported, Patient   midodrine (PROAMATINE) 5 MG tablet Take 15 mg by mouth 3 times daily 2/21/2022 at 1800 Yes Unknown, Entered By History   oxyCODONE-acetaminophen (PERCOCET) 5-325 MG per tablet Take 1 tablet by mouth every 4 hours as needed for moderate to severe pain  at prn Yes Reported, Patient   Probiotic Product (PROBIOTIC DAILY PO) Take 1 capsule by mouth every evening  2/20/2022 at Unknown time Yes Reported, Patient   SUMAtriptan Succinate (IMITREX PO) Take 50 mg by mouth daily as needed for migraine  Past Week at Unknown time Yes Reported, Patient   SUMAtriptan Succinate Refill (IMITREX) 6 MG/0.5ML SOCT Inject 6 mg Subcutaneous daily as needed for migraine  More than a month at Unknown time Yes Reported, Patient   order for DME ADJUSTABLE LIGHTWEIGHT WALKER   Yfn Ulloa MD       The information provided in this note is only as accurate as the sources available at the time of update(s)

## 2022-02-22 NOTE — PROGRESS NOTES
Patient is alert and oriented x 4, VSS on RA, bedrest, NPO, external cath on because of the patient shaky  upper extremity  . Pain is managed with tylenol. NSL running at 100ml/hr

## 2022-02-22 NOTE — ANESTHESIA PROCEDURE NOTES
Airway       Patient location during procedure: OR       Procedure Start/Stop Times: 2/22/2022 3:24 PM  Staff -        Anesthesiologist:  Nicanor Calhoun MD       CRNA: Nieves Perkins APRN CRNA       Other Anesthesia Staff: Geraldine Parry       Performed By: SRNA and with CRNAs       Procedure performed by resident/fellow/CRNA in presence of a teaching physician.    Consent for Airway        Urgency: elective  Indications and Patient Condition       Indications for airway management: gama-procedural       Induction type:intravenous       Mask difficulty assessment: 2 - vent by mask + OA or adjuvant +/- NMBA    Final Airway Details       Final airway type: endotracheal airway       Successful airway: ETT - single  Endotracheal Airway Details        ETT size (mm): 8.0       Cuffed: yes       Successful intubation technique: video laryngoscopy       VL Blade Size: Glidescope 4       Grade View of Cords: 1       Adjucts: stylet       Position: Right       Measured from: lips       Bite block used: None    Post intubation assessment        Placement verified by: capnometry, equal breath sounds and chest rise        Number of attempts at approach: 1       Secured with: pink tape       Ease of procedure: easy       Dentition: Intact and Unchanged

## 2022-02-22 NOTE — CONSULTS
[facility]    Orthopedic Consultation    Meir Richardson MRN# [id number]   Age: 71 year old YOB: 1950     Date of Admission:  2/21/2022    Reason for consult: Right hip fracture       Requesting physician: Jenny GIPSON       Level of consult: Consult, follow and place orders           Assessment and Plan:   Assessment:   Right femoral neck fracture      Plan:   OR today 2/22 for Right femoral neck fx CRPP with Dr. 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 He reports recently had a taper of steroids for prolonged headache. He was seen in [facility] 2/20 for acute onset hip pain/groin pain which presented abruptly with standing. No falls or trauma. CT day of admission showed incomplete femoral neck fracture. [facility] team is planning for OR 2/22 for percutaneous pin of R hip. [facility] thinks etiology is insufficiency fracture. Of note, he had a whole body scan 1 year ago for history of prostate cancer and showed no metastases.           Past Medical History:     Past Medical History:   Diagnosis Date     Behcet's syndrome (H)     diagnosed 1970's. Resolved     Benign essential tremor      Irregular heart beat     PVC's     Migraine      Parkinsons disease (H)      Paroxysmal atrial tachycardia (H)     s/p ablation     PONV (postoperative nausea and vomiting)      Scapular dyskinesis      Syncope      Vitamin B 12 deficiency              Past Surgical History:     Past Surgical History:   Procedure Laterality Date     AV NODE ABLATION  2018    for  atrial tachycardia     CARDIAC SURGERY      ablation     COLONOSCOPY       HAND SURGERY Right 09/2020     HERNIA REPAIR       OPEN REDUCTION INTERNAL FIXATION TOE(S) Right 05/11/2017    Procedure: OPEN REDUCTION INTERNAL FIXATION TOE(S);  RIGHT FIRST MTP FUSION, FOREFOOR RECONSTRUCTION;  Surgeon: Yfn Ulloa MD;  Location:  OR             Social History:     Social History     Tobacco Use     Smoking status: Never Smoker     Smokeless tobacco: Never Used   Substance Use Topics     Alcohol use: No             Family History:   No family history on file.          Immunizations:     VACCINE/DOSE   Diptheria   DPT   DTAP   HBIG   Hepatitis A   Hepatitis B   HIB   Influenza   Measles   Meningococcal   MMR   Mumps   Pneumococcal   Polio   Rubella   Small Pox   TDAP   Varicella   Zoster             Allergies:     Allergies   Allergen Reactions     Erythromycin Hives     Other reaction(s): Angioedema  Tolerated azithromycin.        Dronedarone Other (See Comments)      shakes, difficulty sleeping and high anxiety.   shakes, difficulty sleeping and high anxiety.   shakes, difficulty sleeping and high anxiety.       Flecainide Other (See Comments)     Other reaction(s): Tremors  Reported after 2nd dose also mentioned concurrent migraine (has a hx of migraines prior to flecainide) TAMIKO RN   Reported after 2nd dose also mentioned concurrent migraine (has a hx of migraines prior to flecainide) TAMIKO RN   Reported after 2nd dose also mentioned concurrent migraine (has a hx of migraines prior to flecainide) TAMIKO RN        Keflex [Cephalexin] Rash     Penicillins Rash     Sulfa Drugs Rash     Tetracycline Rash             Medications:     Current Facility-Administered Medications   Medication     [Auto Hold] acetaminophen (TYLENOL) tablet 650 mg    Or     [Auto Hold] acetaminophen (TYLENOL) Suppository 650 mg     [Auto Hold] carbidopa (LODOSYN) tablet 25 mg     [Auto Hold] carbidopa-levodopa half-tab 12.5-50 mg     [Auto Hold]  citalopram (celeXA) tablet 20 mg     [Auto Hold] cyclobenzaprine (FLEXERIL) tablet 10 mg     [Auto Hold] droxidopa (NORTHERA) capsule 200 mg     [Auto Hold] ergotamine-caffeine (CAFERGOT) 1-100 MG per tablet 2 tablet     [Auto Hold] HYDROmorphone (DILAUDID) injection 0.2 mg     [Auto Hold] lidocaine (LMX4) cream     [Auto Hold] lidocaine 1 % 0.1-1 mL     [Auto Hold] melatonin tablet 1 mg     [Auto Hold] midodrine (PROAMATINE) tablet 15 mg     [Auto Hold] naloxone (NARCAN) injection 0.2 mg    Or     [Auto Hold] naloxone (NARCAN) injection 0.4 mg    Or     [Auto Hold] naloxone (NARCAN) injection 0.2 mg    Or     [Auto Hold] naloxone (NARCAN) injection 0.4 mg     [Auto Hold] ondansetron (ZOFRAN-ODT) ODT tab 4 mg    Or     [Auto Hold] ondansetron (ZOFRAN) injection 4 mg     [Auto Hold] oxyCODONE (ROXICODONE) tablet 5 mg     [Auto Hold] sennosides (SENOKOT) tablet 1 tablet     [Auto Hold] sodium chloride (PF) 0.9% PF flush 3 mL     [Auto Hold] sodium chloride (PF) 0.9% PF flush 3 mL     sodium chloride 0.9% infusion             Review of Systems:   CV: NEGATIVE for chest pain, palpitations or peripheral edema  C: NEGATIVE for fever, chills, change in weight  E/M: NEGATIVE for ear, mouth and throat problems  R: NEGATIVE for significant cough or SOB          Physical Exam:   All vitals have been reviewed  Patient Vitals for the past 24 hrs:   BP Temp Temp src Pulse Resp SpO2   02/22/22 0836 -- -- -- -- 16 --   02/22/22 0735 117/85 98.5  F (36.9  C) Oral 92 16 96 %   02/21/22 2356 117/83 98.2  F (36.8  C) Oral 94 18 96 %   02/21/22 2100 120/82 98.4  F (36.9  C) Oral 96 16 98 %       Intake/Output Summary (Last 24 hours) at 2/22/2022 1316  Last data filed at 2/22/2022 0939  Gross per 24 hour   Intake 0 ml   Output --   Net 0 ml     Patient sitting comfortably in bed  Minimal erythema of the surrounding skin.   Bilateral calves are soft, non-tender.  Bilateral lower extremity is NVI.  Sensation intact bilateral lower  extremities  5/5 motor with resisted dorsi and plantar flexion bilaterally  +Dp pulse            Data:   All laboratory data reviewed  Results for orders placed or performed during the hospital encounter of 02/21/22   INR     Status: Normal   Result Value Ref Range    INR 1.04 0.85 - 1.15   Asymptomatic COVID-19 Virus (Coronavirus) by PCR Nose     Status: Normal    Specimen: Nose; Swab   Result Value Ref Range    SARS CoV2 PCR Negative Negative    Narrative    Testing was performed using the carley  SARS-CoV-2 & Influenza A/B Assay on the carley  Cari  System.  This test should be ordered for the detection of SARS-COV-2 in individuals who meet SARS-CoV-2 clinical and/or epidemiological criteria. Test performance is unknown in asymptomatic patients.  This test is for in vitro diagnostic use under the FDA EUA for laboratories certified under CLIA to perform moderate and/or high complexity testing. This test has not been FDA cleared or approved.  A negative test does not rule out the presence of PCR inhibitors in the specimen or target RNA in concentration below the limit of detection for the assay. The possibility of a false negative should be considered if the patient's recent exposure or clinical presentation suggests COVID-19.  [facility] are certified under the Clinical Laboratory Improvement Amendments of 1988 (CLIA-88) as qualified to perform moderate and/or high complexity laboratory testing.   Vitamin D Deficiency     Status: Normal   Result Value Ref Range    Vitamin D, Total (25-Hydroxy) 24 20 - 75 ug/L    Narrative    Season, race, dietary intake, and treatment affect the concentration of 25-hydroxy-Vitamin D. Values may decrease during winter months and increase during summer months. Values 20-29 ug/L may indicate Vitamin D insufficiency and values <20 ug/L may indicate Vitamin D deficiency.    Vitamin D determination is routinely performed by an immunoassay specific for 25 hydroxyvitamin  D3.  If an individual is on vitamin D2(ergocalciferol) supplementation, please specify 25 OH vitamin D2 and D3 level determination by LCMSMS test VITD23.     Basic metabolic panel     Status: Abnormal   Result Value Ref Range    Sodium 135 133 - 144 mmol/L    Potassium 4.2 3.4 - 5.3 mmol/L    Chloride 103 94 - 109 mmol/L    Carbon Dioxide (CO2) 30 20 - 32 mmol/L    Anion Gap 2 (L) 3 - 14 mmol/L    Urea Nitrogen 32 (H) 7 - 30 mg/dL    Creatinine 1.34 (H) 0.52 - 1.25 mg/dL    Calcium 9.2 8.5 - 10.1 mg/dL    Glucose 110 (H) 70 - 99 mg/dL    GFR Estimate 57 (L) >60 mL/min/1.73m2    Narrative    The sex of this patient cannot be reliably determined based on discrepancies in demographics (legal sex, sex assigned at birth, gender identity).  Both male and female reference ranges are provided where applicable.  Careful evaluation of the patient s results as compared to the gender specific reference intervals is required in this setting.    CBC with platelets     Status: Abnormal   Result Value Ref Range    WBC Count 3.1 (L) 4.0 - 11.0 10e3/uL    RBC Count 3.17 (L) 3.80 - 5.90 10e6/uL    Hemoglobin 10.3 (L) 11.7 - 17.7 g/dL    Hematocrit 31.6 (L) 35.0 - 53.0 %     78 - 100 fL    MCH 32.5 26.5 - 33.0 pg    MCHC 32.6 31.5 - 36.5 g/dL    RDW 13.0 10.0 - 15.0 %    Platelet Count 193 150 - 450 10e3/uL    Narrative    The sex of this patient cannot be reliably determined based on discrepancies in demographics (legal sex, sex assigned at birth, gender identity).  Both male and female reference ranges are provided where applicable.  Careful evaluation of the patient s results as compared to the gender specific reference intervals is required in this setting.    EKG 12-lead, tracing only     Status: None (Preliminary result)   Result Value Ref Range    Systolic Blood Pressure  mmHg    Diastolic Blood Pressure  mmHg    Ventricular Rate 94 BPM    Atrial Rate 94 BPM    AZ Interval 160 ms    QRS Duration 86 ms     ms    QTc  477 ms    P Axis 65 degrees    R AXIS 67 degrees    T Axis 65 degrees    Interpretation ECG       Sinus rhythm  Normal ECG  No previous ECGs available     Adult Type and Screen     Status: None   Result Value Ref Range    ABO/RH(D) O POS     Antibody Screen Negative Negative    SPECIMEN EXPIRATION DATE [id number]    ABO/Rh type and screen     Status: None    Narrative    The following orders were created for panel order ABO/Rh type and screen.  Procedure                               Abnormality         Status                     ---------                               -----------         ------                     Adult Type and Screen[[id number]]                            Final result                 Please view results for these tests on the individual orders.          Attestation:  I have reviewed today's vital signs, notes, medications, labs and imaging with Dr. Basilio.  Amount of time performed on this consult: 30 minutes.    Kerrie Rahman PA-C

## 2022-02-23 PROBLEM — S72.001A CLOSED DISPLACED FRACTURE OF RIGHT FEMORAL NECK (H): Status: ACTIVE | Noted: 2022-01-01

## 2022-02-23 NOTE — PROGRESS NOTES
Care Transitions Team: Following for CC, discharge planning, and disposition.       Per [facility] Trauma Liaison Handoff:   Writer spoke with patient's spouse Lola introduced myself and explained my role in Meir's plan of care. Discussed therapy recommendations for discharging home with assist of family. Lola is agreeable to this plan and had no additional concerns.     Living situation:   Support: Spouse and daughter  Prior Function level:   Ambulation independent    ADL's Independent   Current home care services: None  Family/patient discharge goal: Return to baseline level of function.  Barriers to Discharge: Medically stable   Discharge Plan of care: Home with assist    Orders needed for services: Post Op Plan of Care - home with assist.    Weight bearing status and Hip precautions: WBAT with walker for 6 weeks.  Transportation: Spouse will provide transport.     Will follow in collaboration with O PAL Dailey [phone] [facility] for discharge planning.

## 2022-02-23 NOTE — PLAN OF CARE
[id number]: Patient alert and oriented, slow to respond at times, pleasant. VSS on room air. Up with assist of 1, GB, walker. Tolerating regular diet. BS active, passing gas. Oxycodone effective for pain in R hip. Dressing CDI, CMS intact. Pt unable to void, Urology consulted and will be seeing patient today. Discharge plan pending urology consult, PT okay with pt to return home with help from wife.

## 2022-02-23 NOTE — PROGRESS NOTES
Received message from [facility] noting pt has a home care order in place.  PAL-RN contacted pt via room phone; his wife Lola (at bedside) answered.  CM-RN reviewed medicare.gov/care-compare tool and star ratings.  Pt & spouse provided choice: they are interested in [facility], [facility], and any higher-rated homecare agency.  Inquiries for availability sent to [facility], will follow up as needed.    ADDENDUEM 4:13 PM,  + Did not hear back from [facility] liaison   + Did hear back from [facility] liaison - they are able to accept    Pt does not yet have discharge orders.  CM RN 2/24 will need to followup on referrals and final choice.  Of note when PAL-RN inquired bedside RN notes it is possible that urology will place a melgar catheter today and if so pt has supportive spouse who would be able to assist in home cares.      Barbara Hyatt RN, BSN, PHN  [facility]  Inpatient Care Management - FLOAT  Mobile: [phone] 02/23/22 until 4pm  (after today's date, please call the patient's unit)

## 2022-02-23 NOTE — ADDENDUM NOTE
Addendum  created 02/23/22 0802 by Nieves Perkins APRN CRNA    Clinical Note Signed, Intraprocedure Blocks edited, LDA created via procedure documentation

## 2022-02-23 NOTE — PROGRESS NOTES
Hospitalist [facility] cross cover note:    Paged by nursing as pt refusing telemetry monitoring in setting of his baseline tremor requiring frequent replacement of EKG patches.  Pt A/Ox4, remains hemodynamically stable at this time, continues on capnography.  In setting of pt's PMH of orthostatic hypotension, be cautious with sitting pt up, monitor for any signs of presyncope.  Will adjust VS to Q4H monitoring.        MANUEL Kenyon, CNP  Hospitalist [facility]    No charge.

## 2022-02-23 NOTE — PROVIDER NOTIFICATION
Page out to Dr. Quijano    Patient unable to void more than 10 ml. Bladder scan is 590 ml. This would be the 2nd bladderscan, pt is a very difficult straight cath per night RN, do you want urology consult?    Urology consulted.

## 2022-02-23 NOTE — CONSULTS
"[facility] Inpatient Consultation Note    Meir Richardson MRN# [id number]   Age: 71 year old YOB: 1950     Date of Admission:  2/21/2022    Reason for consult: Post op retention       Requesting physician: Dr Quijano                   History of Present Illness:     Meir Richardson is a 70 yo male with h/o prostate cancer treated with IMRT and ADT, bilateral nephrolithiasis, Behcets disease, Parkinson's, and CKD who is now POD#1 open reduction and internal fixation of right hip following fracture. Pt follows with Dr Carrillo for prostate cancer and has seen Dr Amaro for kidney stones. Patient has been unable to void greater than 10cc at a time since surgery. He was straight cathed for 650 ml overnight, reportedly difficult straight cath per RN. Today with bladder scans of 358 then 590 ml and still unable to pass >10 ml at a time. Patient denies discomfort but reports he does feel like \"he should go soon\". Patient has previously been on Flomax but discontinued a year ago due to hypotension. Today patient is anxious to discharge home. Has required a stent in the past for stone but denies history of melgar catheter. Denies renal colic or stone-like symptom, fever, dysuria, hematuria. AVSS, Cr 1.44 (appears baseline).            Past Medical History:     Past Medical History:   Diagnosis Date     Behcet's syndrome (H)     diagnosed 1970's. Resolved     Benign essential tremor      Irregular heart beat     PVC's     Migraine      Parkinsons disease (H)      Paroxysmal atrial tachycardia (H)     s/p ablation     PONV (postoperative nausea and vomiting)      Scapular dyskinesis      Syncope      Vitamin B 12 deficiency              Past Surgical History:     Past Surgical History:   Procedure Laterality Date     AV NODE ABLATION  2018    for atrial tachycardia     CARDIAC SURGERY      ablation     COLONOSCOPY       HAND SURGERY Right 09/2020     HERNIA REPAIR       OPEN REDUCTION INTERNAL FIXATION TOE(S) Right " 05/11/2017    Procedure: OPEN REDUCTION INTERNAL FIXATION TOE(S);  RIGHT FIRST MTP FUSION, FOREFOOR RECONSTRUCTION;  Surgeon: Yfn Ulloa MD;  Location:  OR             Social History:     Social History     Socioeconomic History     Marital status:      Spouse name: Not on file     Number of children: Not on file     Years of education: Not on file     Highest education level: Not on file   Occupational History     Not on file   Tobacco Use     Smoking status: Never Smoker     Smokeless tobacco: Never Used   Vaping Use     Vaping Use: Never used   Substance and Sexual Activity     Alcohol use: No     Drug use: No     Sexual activity: Not on file   Other Topics Concern     Not on file   Social History Narrative     Not on file     Social Determinants of Health     Financial Resource Strain: Not on file   Food Insecurity: Not on file   Transportation Needs: Not on file   Physical Activity: Not on file   Stress: Not on file   Social Connections: Not on file   Intimate Partner Violence: Not on file   Housing Stability: Not on file             Family History:   No family history on file.          Immunizations:     There is no immunization history on file for this patient.          Allergies:     Allergies   Allergen Reactions     Erythromycin Hives     Other reaction(s): Angioedema  Tolerated azithromycin.        Dronedarone Other (See Comments)      shakes, difficulty sleeping and high anxiety.   shakes, difficulty sleeping and high anxiety.   shakes, difficulty sleeping and high anxiety.       Flecainide Other (See Comments)     Other reaction(s): Tremors  Reported after 2nd dose also mentioned concurrent migraine (has a hx of migraines prior to flecainide) CRUZ MORRISON   Reported after 2nd dose also mentioned concurrent migraine (has a hx of migraines prior to flecainide) CRUZ MORRISON   Reported after 2nd dose also mentioned concurrent migraine (has a hx of migraines prior to flecainide) CRUZ MORRISON  [Cephalexin] Rash     Penicillins Rash     Sulfa Drugs Rash     Tetracycline Rash             Medications:     Current Facility-Administered Medications   Medication     acetaminophen (TYLENOL) tablet 650 mg    Or     acetaminophen (TYLENOL) Suppository 650 mg     aspirin (ASA) EC tablet 325 mg     carbidopa (LODOSYN) tablet 25 mg     carbidopa-levodopa half-tab 12.5-50 mg     citalopram (celeXA) tablet 20 mg     cyclobenzaprine (FLEXERIL) tablet 10 mg     droxidopa (NORTHERA) capsule 200 mg     ergotamine-caffeine (CAFERGOT) 1-100 MG per tablet 2 tablet     HYDROmorphone (DILAUDID) injection 0.2 mg     lidocaine (LMX4) cream     lidocaine (XYLOCAINE) 2 % external gel     lidocaine (XYLOCAINE) 2 % external gel     lidocaine 1 % 0.1-1 mL     melatonin tablet 1 mg     midodrine (PROAMATINE) tablet 15 mg     naloxone (NARCAN) injection 0.2 mg    Or     naloxone (NARCAN) injection 0.4 mg    Or     naloxone (NARCAN) injection 0.2 mg    Or     naloxone (NARCAN) injection 0.4 mg     ondansetron (ZOFRAN-ODT) ODT tab 4 mg    Or     ondansetron (ZOFRAN) injection 4 mg     oxyCODONE (ROXICODONE) tablet 5 mg     sennosides (SENOKOT) tablet 1 tablet     sodium chloride (PF) 0.9% PF flush 3 mL     sodium chloride (PF) 0.9% PF flush 3 mL     sodium chloride (PF) 0.9% PF flush 3 mL     sodium chloride (PF) 0.9% PF flush 3 mL     sodium chloride 0.9% infusion     vitamin D3 (CHOLECALCIFEROL) tablet 50 mcg             Review of Systems:   Comprehensive review of systems from the Admission note dated 2/22/22 at [facility] was reviewed with no changes except per HPI.     Examination:  /81 (BP Location: Right arm)   Pulse 100   Temp 98.6  F (37  C) (Oral)   Resp 16   SpO2 95%   General: Alert and oriented, no distress  HEENT: Face symmetric, mucous membranes moist and pink  Eyes: No scleral icterus  Neck: Symmetric  Respiratory: Breathing unlabored, no audible wheezing  Cardiac: Extremities warm and well  perfused, no edema  Abdomen soft, non tender, non distended  Back: No CVA or flank tenderness  : See procedure note for melgar placement for clear urine  Extremities: No evidence of deformities or trauma  Neuro:Grossly non focal  Pysch: Normal mood, somewhat flat affect  Skin: No evident rashes or lesions            Data:     Lab Results   Component Value Date    WBC 3.1 02/21/2022     Lab Results   Component Value Date    RBC 3.17 02/21/2022     Lab Results   Component Value Date    HGB 10.0 02/23/2022     Lab Results   Component Value Date    HCT 31.6 02/21/2022     Lab Results   Component Value Date     02/21/2022     Creatinine   Date Value Ref Range Status   02/23/2022 1.44 (H) 0.52 - 1.25 mg/dL Final     Comment:     20 y and older Female0.52-1.04 mg/dL  20 y and older Male0.66-1.25 mg/dL    Varies with the amount of muscle mass present.    GICH  Female: 0.6-1.2 mg/dL  Male: 0.7-1.3 mg/dL     ]  Lab Results   Component Value Date    BUN 29 02/23/2022     Procedure:    Catheter insertion documentation on 2/23/2022:    Meir Lewisdestinydre presents for catheter insertion.  Reason for insertion: urinary retention  Catheter successfully inserted into the urethral meatus in the usual sterile fashion without immediate complication. Some tightness noted at bladder neck.   Type of catheter placed: 14 F coude indwelling catheter  Urine is yellow in color.  300+ cc's of urine output returned.  Balloon was filled with 10cc's of normal saline.  Securement device placed for the catheter.  The patient tolerated the procedure and was instructed to return or call for pain, fever, leakage or decreased urine flow    Maria Dolores Johnson PA-C      Impression: 70 yo male with h/o prostate cancer with post operative urinary retention    Plan:  - Urology placed indwelling cathter at bedside for return of at least 300ml yellow urine. Reasoning for placement includes difficult straight cath, persistent high bladder scans and very  little UOP, and patient readiness to discharge today.   - Plan for TOV in urology office, likely Monday. AVS updated.   - Will hold off on Flomax given hypotension  - Discussed plan with patient with family at bedside.     Maria Dolores Johnson PA-C  [facility]   [phone]

## 2022-02-23 NOTE — PLAN OF CARE
Goal Outcome Evaluation:    A/O x4, slow to respond at times. Wife at bedside. VSS on RA. Tolerating regular diet. Unable to void spontaneously, Urology placed melgar. Dressing CDI. Discharged home with wife today. Discharge meds and instructions given to pt and wife.       Plan of Care Reviewed With: patient       Outcome Evaluation: pt VSS in recovery, sleeping between cares

## 2022-02-23 NOTE — DISCHARGE SUMMARY
Discharge Summary  Hospitalist    Date of Admission:  2/21/2022  Date of Discharge:  2/23/2022  Discharging Provider: Anjelica Quijano MD  Date of Service (when I saw the patient): 02/23/22    Discharge Diagnoses      Incomplete right femoral neck fracture, nontraumatic, likely pathologic/insufficiency fracture  Acute blood loss anemia, expected postop    History of Present Illness   Please refer H & P for details.      Hospital Course   Meir Richardson is a 71 year old adult with PMH significant for Behcets disease, parkinson's disease, paroxysmal atrial tachycardia s/p ablation, CKD, orthostatic hypotension with recurrent syncope who was directly admitted on 2/21/22 by referral of orthopedics for nontraumatic incomplete femoral neck fracture requiring repair.      Incomplete right femoral neck fracture, nontraumatic, likely pathologic/insufficiency fracture  Acute blood loss anemia, expected postop  Hx Behcets disease and required steroids >40 years ago in the 1970s. He reports recently had a taper of steroids for prolonged headache. He was seen in [facility] 2/20 for acute onset hip pain/groin pain which presented abruptly with standing. No falls or trauma. CT day of admission showed incomplete femoral neck fracture. [facility] team is planning for OR 2/22 for percutaneous pin of R hip. [facility] thinks etiology is insufficiency fracture. Of note, he had a whole body scan 1 year ago for history of prostate cancer and showed no metastases.   - Admit to inpatient  - Orthopedics surgery consultation  - Bed rest, purewick as needed  - Hold NSAIDs  - Pain control   -Patient underwent ORIF right hip with no complications.  Started on aspirin for DVT prophylaxis by orthopedics.  -Cleared for discharge home on 2/23 per PT.  -Vitamin D level returned borderline low at 24.  Started on replacement.    Postop urinary retention  Noted to have postop urinary retention.  Difficult straight cath.  Urology was consulted.  Coker was placed.  Will  follow up in urology clinic in a week for trial of void.  -Did not prescribe Flomax given hypotension.     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 On midodrine. Follows with PCP and cardiology closely as well as neurology. Presyncope/syncope last 1 month ago. Lightheaded with ambulation and especially stairs.   * Receives 2L NS at infusion clinic Mondays, Wednesdays, and Fridays (Fri he has 1 L NS and 1L MVI)  - MIVF  - Continue PTA regimen including midodrine, droxidopa, Lodosyn, Sinemet  - Monitor closely     Hx PAT s/p ablation  Follows at [facility].   Last ECHO: 8/2021: EF 65-70%  Recent coronary CTA with calcium score of 1  - Telemetry  - F/u with cardiology as an outpatient     Parkinsons disease  Follows closely with neurology.  - Resume PTA regimen including Lodosyn, Sinemet, droxidopa     Chronic kidney disease, stage 3a  Baseline Cr 1.3-1.5. On admission, at baseline 1.34.  - Avoid nephrotoxins - contrast, NSAIDs  - Renally dose medications as able/needed  - Monitor     Anemia   Baseline appears 10-11 range.   - Monitor           Recent Labs   Lab 02/21/22  2149   HGB 10.3*         Hx Behcets disease  Historically with lesions to eyes, mouth, penile region. Currently inactive, over 40 years ago required long term steroids but has not for many years.      Other pertinent history and chronic stable diagnoses: Appropriate prior to admission medications will be resumed.   Vitamin B12 deficiency  Benign essential tremor  Migraines  Prostate cancer     Asymptomatic COVID-19 admission screening PCR:  Negative at admission  - COVID-19 vaccination status: Fully vaccinated and boosted with Pfizer.  - Influenza vaccination status: 11/2021             Anjelica Quijano MD, MD      Pending Results   These results will be followed up by Hospitalist team.  Unresulted  Labs Ordered in the Past 30 Days of this Admission     Date and Time Order Name Status Description    2/22/2022  1:21 PM 25 Hydroxyvitamin D2 and D3 In process           Code Status   DNR / DNI       Primary Care Physician   Fareed Webb    Follow-ups Needed After Discharge   Follow-up Appointments     Follow-up and recommended labs and tests      Follow-up with Dr Basilio/Christie ROY at [facility] 6 weeks following your   surgery. Can be seen at 2 weeks post op if concerns/questions   To arrange appointment or questions call: the care coordinator at   [phone]  [facility] [place] phone number: [phone]  [facility] phone number: [phone]         Follow-up and recommended labs and tests       Follow up with primary care provider, Fareed Webb, within 7 days for   hospital follow- up.  No follow up labs or test are needed.  Follow up in Urology clinic in 1 week for trial of void.             Physical Exam   Temp: 98.6  F (37  C) Temp src: Oral BP: 121/81 Pulse: 100   Resp: 16 SpO2: 95 % O2 Device: None (Room air) Oxygen Delivery: 2 LPM  There were no vitals filed for this visit.  Vital Signs with Ranges  Temp:  [98  F (36.7  C)-99  F (37.2  C)] 98.6  F (37  C)  Pulse:  [] 100  Resp:  [8-21] 16  BP: (105-142)/(70-97) 121/81  SpO2:  [94 %-98 %] 95 %  I/O last 3 completed shifts:  In: 740 [P.O.:540; I.V.:150; IV Piggyback:50]  Out: 1330 [Urine:1310; Blood:20]    Constitutional: Alert, appears comfortable, in no acute distress, resting tremors of upper and lower extremities noted  Respiratory: Non labored breathing, clear to auscultation bilaterally, no crackles or wheezes  Cardiovascular: Heart sounds regular rate and rhythm, no murmurs, no leg edema  GI: Abdomen is soft, non distended, non tender. Normal BS  Neuro: alert, converses appropriately, masked facies noted, fluent speech, no facial asymmetry  Psych: mood and affect appropriate        Discharge Disposition   Discharged to home  Condition at discharge:  Stable    Consultations This Hospital Stay   ORTHOPEDIC SURGERY IP CONSULT  PHYSICAL THERAPY ADULT IP CONSULT  OCCUPATIONAL THERAPY ADULT IP CONSULT  UROLOGY IP CONSULT  UROLOGY IP CONSULT  UROLOGY IP CONSULT  UROLOGY IP CONSULT    Time Spent on this Encounter   Anjelica ALEJANDRE MD, personally saw the patient today and spent greater than 30 minutes discharging this patient.    Discharge Orders      Home care nursing referral      Home Care Referral      Reason for your hospital stay    Right hip fracture: [facility]     Follow-up and recommended labs and tests    Follow-up with Dr Basilio/Christie ROY at [facility] 6 weeks following your surgery. Can be seen at 2 weeks post op if concerns/questions   To arrange appointment or questions call: the care coordinator at [phone]  [facility] [place] phone number: [phone]  [facility] [place] phone number: [phone]     Activity    Your activity upon discharge: WBAT Right LE with walker.  Rest as needed.     Reason for your hospital stay    Right hip fracture     Follow-up and recommended labs and tests     Follow up with primary care provider, Fareed Webb, within 7 days for hospital follow- up.  No follow up labs or test are needed.  Follow up in Urology clinic in 1 week for trial of void.     Crutches DME    DME Documentation: Describe the reason for need to support medical necessity: Impaired gait status post hip surgery. I, the undersigned, certify that the above prescribed supplies are medically necessary for this patient and is both reasonable and necessary in reference to accepted standards of medical practice in the treatment of this patient's condition and is not prescribed as a convenience.     Cane DME    DME Documentation: Describe the reason for need to support medical necessity: Impaired gait status post hip surgery. I, the undersigned, certify that the above prescribed supplies are medically necessary for this patient and is both reasonable and necessary in  reference to accepted standards of medical practice in the treatment of this patient's condition and is not prescribed as a convenience.     Walker DME    : DME Documentation: Describe the reason for need to support medical necessity: Impaired gait status post hip surgery. I, the undersigned, certify that the above prescribed supplies are medically necessary for this patient and is both reasonable and necessary in reference to accepted standards of medical practice in the treatment of this patient's condition and is not prescribed as a convenience.     Walker Order for DME - ONLY FOR DME    I, the undersigned, certify that the above prescribed supplies are medically necessary for this patient and is both reasonable and necessary in reference to accepted standards of medical and necessary in reference to accepted standards of medical practice in the treatment of this patient's condition and is not prescribed as a convenience.      Diet    Follow this diet upon discharge: Orders Placed This Encounter      Advance Diet as Tolerated: Regular Diet Adult     Discharge Medications   Current Discharge Medication List      START taking these medications    Details   aspirin (ASA) 325 MG EC tablet Take 1 tablet (325 mg) by mouth daily  Qty: 30 tablet, Refills: 0    Associated Diagnoses: Closed fracture of neck of right femur, initial encounter (H)      oxyCODONE (ROXICODONE) 5 MG tablet Take 0.5-1 tablets (2.5-5 mg) by mouth every 4 hours as needed for moderate to severe pain  Qty: 25 tablet, Refills: 0    Associated Diagnoses: Closed fracture of neck of right femur, initial encounter (H)      sennosides (SENOKOT) 8.6 MG tablet Take 1 tablet by mouth 2 times daily as needed for constipation  Qty: 20 tablet, Refills: 0    Associated Diagnoses: Closed fracture of neck of right femur, initial encounter (H)      vitamin D3 (CHOLECALCIFEROL) 50 mcg (2000 units) tablet Take 1 tablet (50 mcg) by mouth daily  Qty: 30 tablet, Refills:  0    Associated Diagnoses: Closed fracture of neck of right femur, initial encounter (H)         CONTINUE these medications which have NOT CHANGED    Details   acetaminophen (TYLENOL) 325 MG tablet Take 2 tablets (650 mg) by mouth every 6 hours as needed for mild pain  Qty: 60 tablet, Refills: 0    Associated Diagnoses: Displaced fracture of metatarsal bone of right foot, unspecified metatarsal, initial encounter      carbidopa (LODOSYN) 25 MG TABS tablet Take 25 mg by mouth 3 times daily      carbidopa-levodopa (SINEMET)  MG per tablet Take 1.5 tablets by mouth 3 times daily With food      citalopram (CELEXA) 20 MG tablet Take 20 mg by mouth daily      cyclobenzaprine (FLEXERIL) 10 MG tablet Take 10 mg by mouth daily      droxidopa (NORTHERA) 100 MG capsule Take 200 mg by mouth 3 times daily      ergotamine-caffeine (CAFERGOT) 1-100 MG tablet Take 2 tablets by mouth daily as needed for headaches or migraine Two tablets at onset of attack; then 1 tablet every 30 minutes as needed; maximum: 6 tablets per attack; do not exceed 10 tablets/week.      ibuprofen (ADVIL/MOTRIN) 600 MG tablet Take 1 tablet (600 mg) by mouth every 6 hours as needed for other (inflammatory pain)  Qty: 75 tablet, Refills: 0    Associated Diagnoses: Displaced fracture of metatarsal bone of right foot, unspecified metatarsal, initial encounter      isometheptene-dichloralphenazone-acetaminophen (MIDRIN) -325 MG per capsule Take 1 capsule by mouth 4 times daily as needed for headaches      midodrine (PROAMATINE) 5 MG tablet Take 15 mg by mouth 3 times daily      Probiotic Product (PROBIOTIC DAILY PO) Take 1 capsule by mouth every evening       SUMAtriptan Succinate (IMITREX PO) Take 50 mg by mouth daily as needed for migraine       SUMAtriptan Succinate Refill (IMITREX) 6 MG/0.5ML SOCT Inject 6 mg Subcutaneous daily as needed for migraine       order for DME ADJUSTABLE LIGHTWEIGHT WALKER  Qty: 1 Units, Refills: 0    Associated  Diagnoses: Displaced fracture of metatarsal bone of right foot, unspecified metatarsal, initial encounter         STOP taking these medications       oxyCODONE-acetaminophen (PERCOCET) 5-325 MG per tablet Comments:   Reason for Stopping:             Allergies   Allergies   Allergen Reactions     Erythromycin Hives     Other reaction(s): Angioedema  Tolerated azithromycin.        Dronedarone Other (See Comments)      shakes, difficulty sleeping and high anxiety.   shakes, difficulty sleeping and high anxiety.   shakes, difficulty sleeping and high anxiety.       Flecainide Other (See Comments)     Other reaction(s): Tremors  Reported after 2nd dose also mentioned concurrent migraine (has a hx of migraines prior to flecainide) TAMIKO, RN   Reported after 2nd dose also mentioned concurrent migraine (has a hx of migraines prior to flecainide) TAMIKO RN   Reported after 2nd dose also mentioned concurrent migraine (has a hx of migraines prior to flecainide) TAMIKO RN        Keflex [Cephalexin] Rash     Penicillins Rash     Sulfa Drugs Rash     Tetracycline Rash     Data   Most Recent 3 CBC's:  Recent Labs   Lab Test 02/23/22  0806 02/21/22  2149   WBC  --  3.1*   HGB 10.0* 10.3*   MCV  --  100   PLT  --  193      Most Recent 3 BMP's:  Recent Labs   Lab Test 02/23/22  0806 02/21/22  2149    135   POTASSIUM 4.2 4.2   CHLORIDE 104 103   CO2 29 30   BUN 29 32*   CR 1.44* 1.34*   ANIONGAP 2* 2*   SAMUEL 9.0 9.2   GLC 93 110*     Most Recent 2 LFT's:No lab results found.  Most Recent INR's and Anticoagulation Dosing History:  Anticoagulation Dose History     Recent Dosing and Labs Latest Ref Rng & Units 2/21/2022    INR 0.85 - 1.15 1.04        Most Recent 3 Troponin's:No lab results found.  Most Recent Cholesterol Panel:No lab results found.  Most Recent 6 Bacteria Isolates From Any Culture (See EPIC Reports for Culture Details):No lab results found.  Most Recent TSH, T4 and A1c Labs:No lab results found.    Results for orders placed  or performed during the hospital encounter of 02/21/22   XR Surgery TENA Fluoro L/T 5 Min w Stills    Narrative    SURGERY C-ARM FLUORO LESS THAN 5 MINUTES WITH STILLS 2/22/2022 4:15 PM      HISTORY: Open reduction internal fixation, right hip.    NUMBER OF IMAGES ACQUIRED: 3    FLUOROSCOPY TIME: 0.6 minute      Impression    IMPRESSION: Three intraoperative fluoroscopic spot images are provided  for review during nailing of the right proximal femur. See operative  summary if further details are needed.

## 2022-02-23 NOTE — PROGRESS NOTES
Orthopedic Surgery  Meir Richardson  2022  Admit Date:  2022  POD: 1 Day Post-Op   Procedure(s):  OPEN REDUCTION INTERNAL FIXATION, RIGHT HIP.    Alert and oriented.   Patient resting comfortably in bed.    Pain controlled.  Tolerating oral intake.    Denies nausea or vomiting  Denies chest pain or shortness of breath    Vital Sign Ranges  Temperature Temp  Av.7  F (37.1  C)  Min: 98  F (36.7  C)  Max: 99.1  F (37.3  C)   Blood pressure Systolic (24hrs), Av , Min:111 , Max:142        Diastolic (24hrs), Av, Min:70, Max:97      Pulse Pulse  Av.5  Min: 82  Max: 105   Respirations Resp  Av.1  Min: 8  Max: 21   Pulse oximetry SpO2  Av.3 %  Min: 94 %  Max: 100 %       Dressing is clean, dry, and intact.   Minimal erythema of the surrounding skin.   Bilateral calves are soft, non-tender.  Right lower extremity is NVI.  Sensation intact bilateral lower extremities  Patient able to resist dorsi and plantar flexion bilaterally  +Dp pulse    Labs:  Recent Labs   Lab Test 22  2149   WBC 3.1*     Recent Labs   Lab Test 22  0806 22  2149   HGB 10.0* 10.3*     Recent Labs   Lab Test 22  2149   INR 1.04     Recent Labs   Lab Test 22  2149          1. PLAN:   Continue ASA for DVT prophylaxis.     Mobilize with PT/OT    WBAT Right LE with walker.     Continue current pain regiment.   Dressings: Keep intact.  Change if >60% saturated or peeling off.    Follow-up: 6 weeks if doing well.      2. Disposition   Anticipate d/c to home with home PT later today or tomorrow when medically cleared and progressing in PT.    Shameka Zacarias PA-C

## 2022-02-23 NOTE — ANESTHESIA POSTPROCEDURE EVALUATION
Patient: Meir Richardson    Procedure: Procedure(s):  OPEN REDUCTION INTERNAL FIXATION, RIGHT HIP.       Anesthesia Type:  General    Note:  Disposition: Inpatient   Postop Pain Control: Uneventful            Sign Out: Well controlled pain   PONV: No   Neuro/Psych: Uneventful            Sign Out: Acceptable/Baseline neuro status   Airway/Respiratory: Uneventful            Sign Out: Acceptable/Baseline resp. status   CV/Hemodynamics: Uneventful            Sign Out: Acceptable CV status   Other NRE: NONE   DID A NON-ROUTINE EVENT OCCUR? No           Last vitals:  Vitals Value Taken Time   /84 02/22/22 1810   Temp 37.2  C (99  F) 02/22/22 1800   Pulse 105 02/22/22 1820   Resp 14 02/22/22 1820   SpO2 97 % 02/22/22 1812   Vitals shown include unvalidated device data.    Electronically Signed By: Grzegorz Thapa MD  February 22, 2022  8:35 PM

## 2022-02-23 NOTE — PROGRESS NOTES
Patient transferred from PACU @ 1830. A&O x4. Rating pain 6/10, given IV dilaudid x1. External catheter in place. IVF infusing, denies nausea, given courtesy meal, tolerating well. Will continue to monitor.

## 2022-02-23 NOTE — PROGRESS NOTES
02/23/22 1210   Quick Adds   Type of Visit Initial Occupational Therapy Evaluation   Living Environment   People in Home spouse   Current Living Arrangements house   Home Accessibility stairs to enter home;stairs within home   Number of Stairs, Main Entrance 5   Stair Railings, Main Entrance none   Number of Stairs, Within Home, Primary ten   Stair Railings, Within Home, Primary railings on both sides of stairs   Transportation Anticipated family or friend will provide   Living Environment Comments Pt reports living in a house with his spouse. Pt reports needing to negotiate stairs to enter and within the home. Pt reports his spouse will pick him up upon discharge. Pt reports his spouse and his daughter will be able to provide 24/7 assist as needed. Pt's spouse reports he can get a hospital bed if needed.   Self-Care   Usual Activity Tolerance moderate   Current Activity Tolerance fair   Regular Exercise No   Equipment Currently Used at Home grab bar, toilet;raised toilet seat;shower chair;walker, standard   Fall history within last six months yes   Number of times patient has fallen within last six months 2   Activity/Exercise/Self-Care Comment Pt reports being IND at baseline with all ADLs. Pt reports being able to ambulate without an AD at baseline but has a FWW at home if needed. Pt reports stairs are not difficult for him. Pt reports being able to ambulate ~200' w/o an AD before needing a rest break. Pt's spouse performs errands as needed. Pt is currently receiving OP PT.   Instrumental Activities of Daily Living (IADL)   IADL Comments Wife completes   General Information   Onset of Illness/Injury or Date of Surgery 02/22/22   Referring Physician Ronak De La Fuente   Patient/Family Therapy Goal Statement (OT) home   Additional Occupational Profile Info/Pertinent History of Current Problem 71 year old adult with PMH significant for Behcets disease, parkinson's disease, paroxysmal atrial tachycardia s/p ablation,  CKD, orthostatic hypotension with recurrent syncope who was directly admitted on 2/21/22 by referral of orthopedics for nontraumatic incomplete femoral neck fracture requiring repair.    Existing Precautions/Restrictions fall   Left Lower Extremity (Weight-bearing Status) full weight-bearing (FWB)   Right Lower Extremity (Weight-bearing Status) weight-bearing as tolerated (WBAT)   General Observations and Info Activity order: ambulate with assist 3x daily   Cognitive Status Examination   Orientation Status orientation to person, place and time   Affect/Mental Status (Cognitive) WFL   Follows Commands does not follow one-step commands;over 90% accuracy   Pain Assessment   Patient Currently in Pain Yes, see Vital Sign flowsheet   Integumentary/Edema   Integumentary/Edema Comments post-op dressing on surgical site   Posture   Posture protracted shoulders;forward head position   Range of Motion Comprehensive   Comment, General Range of Motion pt demonstrated functional BUE AROM during dressing task; RLE limited by surgical pain, LLE WFL   Strength Comprehensive (MMT)   Comment, General Manual Muscle Testing (MMT) Assessment pt demonstrated functional BUE strength during transfers; RLE limited by surgical pain, LLE WFL   Coordination   Coordination Comments mild tremor   Bed Mobility   Bed Mobility supine-sit;sit-supine   Supine-Sit [place] (Bed Mobility) contact guard;verbal cues   Sit-Supine [place] (Bed Mobility) contact guard;verbal cues   Comment (Bed Mobility) HOB flat   Transfers   Transfers sit-stand transfer;toilet transfer;shower transfer   Sit-Stand Transfer   Sit-Stand [place] (Transfers) contact guard;verbal cues   Assistive Device (Sit-Stand Transfers) walker, standard   Shower Transfer   Shower Transfer Comments has walk-in shower with shower seat if needed   Toilet Transfer   Type (Toilet Transfer) sit-stand;stand-sit   [place] Level (Toilet Transfer) contact guard;verbal  cues;supervision   Assistive Device (Toilet Transfer) commode chair;walker, standard   Toilet Transfer Comments pt has raised toilet with TSF on upper level toilet; LL toilet regular height, no support   Balance   Balance Comments good seated; mildly unsteady ambulating in room with 2WW   Activities of Daily Living   BADL Assessment/Intervention lower body dressing   Lower Body Dressing Assessment/Training   [place] Level (Lower Body Dressing) minimum assist (75% patient effort)   Position (Lower Body Dressing) edge of bed sitting   Clinical Impression   Criteria for Skilled Therapeutic Interventions Met (OT) Yes, treatment indicated   OT Diagnosis impaired ADLs   OT Problem List-Impairments impacting ADL problems related to;activity tolerance impaired;range of motion (ROM);strength;pain   Assessment of Occupational Performance 3-5 Performance Deficits   Identified Performance Deficits dressing, bathing, toileting   Planned Therapy Interventions (OT) ADL retraining;transfer training   Clinical Decision Making Complexity (OT) moderate complexity   Risk & Benefits of therapy have been explained evaluation/treatment results reviewed;care plan/treatment goals reviewed;participants included;patient;spouse/significant other   OT Discharge Planning   OT Discharge Recommendation (DC Rec) home with assist   OT Rationale for DC Rec Pt below baseline, limited by pain and decreased ROM.  He has 24/7 assist at home, and is progressing well.  Anticipate he will be able to discharge to home with family to assist once medically stable.     OT Brief overview of current status Koby LE dressing, CGA toilet transfer to commode overlay   Total Evaluation Time (Minutes)   Total Evaluation Time (Minutes) 5

## 2022-02-23 NOTE — PROVIDER NOTIFICATION
Voicemail left with Urology Rebecca Johnson    Wondering when you will be coming to see patient today, pt needs to be straight cath'd but if you were coming soon and if melgar needed to be placed then I wanted to wait. Pt has been a difficult cath and it was painful for him. Thank you!    Order:

## 2022-02-23 NOTE — PROGRESS NOTES
"   02/23/22 0800   Quick Adds   Type of Visit Initial PT Evaluation       Present no   Living Environment   People in Home spouse   Current Living Arrangements house   Home Accessibility stairs to enter home;stairs within home   Number of Stairs, Main Entrance 5   Stair Railings, Main Entrance none   Number of Stairs, Within Home, Primary ten   Stair Railings, Within Home, Primary railings on both sides of stairs   Transportation Anticipated family or friend will provide   Living Environment Comments Pt reports living in a house with his spouse. Pt reports needing to negotiate stairs to enter and within the home. Pt reports his spouse will pick him up upon discharge. Pt reports his spouse and his daughter will be able to provide 24/7 assist as needed. Pt's spouse reports he can get a hospital bed if needed.   Self-Care   Usual Activity Tolerance moderate   Current Activity Tolerance fair   Regular Exercise No   Equipment Currently Used at Home walker, standard   Fall history within last six months yes   Number of times patient has fallen within last six months 2   Activity/Exercise/Self-Care Comment Pt reports being IND at baseline with all ADLs. Pt reports being able to ambulate without an AD at baseline but has a FWW at home if needed. Pt reports stairs are not difficult for him. Pt reports being able to ambulate ~200' w/o an AD before needing a rest break. Pt's spouse performs errands as needed.   General Information   Onset of Illness/Injury or Date of Surgery 02/23/22   Referring Physician Ronak De La Fuente PA-C   Patient/Family Therapy Goals Statement (PT) \"To go home\"   Pertinent History of Current Problem (include personal factors and/or comorbidities that impact the POC) s/p R Hip ORIF POD # 1   Existing Precautions/Restrictions fall   Weight-Bearing Status - LLE full weight-bearing   Weight-Bearing Status - RLE weight-bearing as tolerated   Cognition   Orientation Status (Cognition) " oriented x 3   Pain Assessment   Patient Currently in Pain No   Posture    Posture Forward head position;Protracted shoulders   Range of Motion (ROM)   Range of Motion ROM is WFL;ROM deficits secondary to surgical procedure;ROM deficits secondary to pain;ROM deficits secondary to swelling;ROM deficits secondary to weakness   ROM Comment L LE ROM WFL   Strength (Manual Muscle Testing)   Strength (Manual Muscle Testing) strength is WFL;Able to perform L SLR   Bed Mobility   Comment, (Bed Mobility) Supine>sit w/ CGA   Transfers   Comment, (Transfers) Sit>stand w/ FWW and CGA   Gait/Stairs (Locomotion)   [place] Level (Gait) contact guard   Assistive Device (Gait) walker, front-wheeled   Distance in Feet (Required for LE Total Joints) 100' x 2  (10' eval)   Comment, (Gait/Stairs) Pt ambulated ~10' w/ FWW and CGA for eval. Pt was steady throughout and had no LOB.   Balance   Balance Comments Pt able to sit at EOB unsupported without LOB. Pt ambulates using a FWW for added stability and support.   Sensory Examination   Sensory Perception patient reports no sensory changes   Clinical Impression   Criteria for Skilled Therapeutic Intervention Yes, treatment indicated   PT Diagnosis (PT) Impaired gait   Influenced by the following impairments Decreased activity tolerance; decreased balance; decreased strength; Increased pain   Functional limitations due to impairments Impaired functional mobility   Clinical Presentation (PT Evaluation Complexity) Stable/Uncomplicated   Clinical Presentation Rationale Clinical Judgement   Clinical Decision Making (Complexity) low complexity   Planned Therapy Interventions (PT) balance training;bed mobility training;gait training;home exercise program;patient/family education;stair training;strengthening;transfer training   Risk & Benefits of therapy have been explained evaluation/treatment results reviewed;care plan/treatment goals reviewed;risks/benefits reviewed;current/potential  barriers reviewed;participants voiced agreement with care plan;participants included;patient   PT Discharge Planning   PT Discharge Recommendation (DC Rec) home with assist   PT Rationale for DC Rec Pt is below baseline but is moving well. Anticipate at time of discharge pt will be SBA for bed mobility, functional transfers, gait w/ FWW and min A x 1 for stairs. Pt will have 24/7 assist from wife as needed and also has access to daughter who can assist as well.   PT Brief overview of current status Supine>sit w/ CGA; sit>stand w/ FWW and CGA; gait w/ FWW and CGA; stairs w/ bilat rails and CGA   Plan of Care Review   Plan of Care Reviewed With patient   Total Evaluation Time   Total Evaluation Time (Minutes) 10   Physical Therapy Goals   PT Frequency Daily   PT Predicated Duration/Target Date for Goal Attainment 02/28/22   PT Goals Bed Mobility;Transfers;Gait;Stairs   PT: Bed Mobility Supervision/stand-by assist;Supine to/from sit   PT: Transfers Supervision/stand-by assist;Sit to/from stand;Assistive device   PT: Gait Supervision/stand-by assist;Assistive device;100 feet   PT: Stairs Minimal assist;10 stairs;Rail on both sides

## 2022-02-23 NOTE — PROGRESS NOTES
PT a;/ o x 4. Pt refused tele monitor. Pt on 2 liters of oxygen. O2 sats greater then 94 % all shift.   Bladder scanned A/O at 459, straight cath for 650 ml  Bedrest, repositioned q 2 hours. Vital signs stable. IV fluids running continuously. Hemoglobin 10.3. Bladder scan at 0600 359. Pt has not voided on his own during shift.

## 2022-02-24 NOTE — PROGRESS NOTES
Writer received e-mail from Zulema, Liaison from [facility] informing that they can accept the Home Care Referral (PT and RN). Patient discharged to Home yesterday. Writer called patient to inform of Home Care Agency. Phone number also given to patient.

## 2022-02-24 NOTE — PLAN OF CARE
Physical Therapy Discharge Summary    Reason for therapy discharge:    Discharged to home.    Progress towards therapy goal(s). See goals on Care Plan in [facility] electronic health record for goal details.  Goals partially met.  Barriers to achieving goals:   discharge from facility.    Therapy recommendation(s):    Continue home exercise program.

## 2022-02-24 NOTE — PROGRESS NOTES
Clinic Care Coordination Contact  [facility]/Voicemail       Clinical Data: Care Coordinator Outreach  Outreach attempted x 1.   Wife Lola answered and said he (Mr. Richardson)was not available and they have a nurse coming, I let her know we will try him back tomorrow   Plan:  Care Coordinator will try to reach patient again in 1-2 business days.    Veronique Rich  Care Transitions Assistant  [facility]

## 2022-02-25 NOTE — PROGRESS NOTES
Clinic Care Coordination Contact  [facility]/Voicemail       Clinical Data: Care Coordinator Outreach  Outreach attempted x 2.  Left message on patient's voicemail with call back information and requested return call.  Plan:  Care Coordinator will do no further outreaches at this time.      Corie Boo  Community Health Worker  [facility]  Ph:[phone]

## 2022-03-24 NOTE — OP NOTE
Posterior Total Hip Arthroplasty -     Meir Richardson MRN# [id number]   YOB: 1950  Procedure Date:  3/24/2022  Age: 71 year old       PREOPERATIVE DIAGNOSIS:    1.  Failed Femoral Neck Fracture fixation, right hip.    POSTOPERATIVE DIAGNOSIS:    1.  Failed Femoral Neck Fracture fixation, right hip.    PROCEDURE PERFORMED:    1.  Conversion of previous surgery to right total hip arthroplasty.  Posterior lateral   approach.  2.  Autologous Bone grafting right femur    SURGEON:  Lito Basilio M.D.    FIRST ASSISTANT:  KOKO Kelley OPA, whose was critical for positioning, retraction during exposure, placement of implants, and closure.    ANESTHESIA:  General    INDICATIONS:  Meir Richardson  is a 71 year old-year-old with displaced femoral neck fracture right hip s/p fixation last month who has gone on to rapid collapse of his femoral head and neck.  Discussed both operative and nonoperative management.  Risks of surgery discussed included but not limited to bleeding, infection, damage to surrounding neurovascular structures, leg length inequality, dislocation, periprosthetic fracture, need for revision surgery, blood clots, pulmonary embolus, stroke, anesthetic complications and even death.  No guarantees given or implied. Patient thoughtfully acknowledges risks and wishes to proceed with conversion to total hip arthroplasty.    IMPLANTS:  Filemno [place] N1 50 offset 150 mm stem, 58 mm Trident II cup, MDM liner, and +4 28 mm Biolox head 46 MDM liner X3    PROCEDURE:  After obtaining informed surgical consent, and marking patient operative site the patient was brought to the operating room.  Tranexamic acid 1 g given prior to surgery and additional gram at closure.  2 grams of Ancef was administered intravenously within one hour prior to surgery and will be discontinued within 24 hours.  General anesthetic.  Placed in the lateral decubitus position with axillary roll and all prominences  well padded.  Chlorohexidine prescrub and the right hip was prepped with Chloroprep and draped in the usual sterile fashion.  A posterolateral incision was made.  Dissection was carried through the IT band and tensor fascia.  Split fascia of vastus, identified and removed the Synthes FNS system without difficulty.  Placed device back while we dislocated the hip and then removed.  The external rotators and capsule were identified, tagged, divided, and preserved for later repair.  The hip was dislocated.  Displaced femoral neck fracture that was subsided.  The femoral head and neck were resected approximately 15mm proximal to lesser trochanter.  The acetabulum was exposed and advanced osteoarthritis present.  A labrectomy was performed.  The pulvinar was excised.  The pulvinar was excised.  The acetabulum was sequentially reamed from 49 to 58 mm.  A 58 mm Tritanium cup was implanted with slightly increased anteversion reduced pelvic abduction of approximately 40 degrees.  MDM liner placed.      The femoral canal was then opened with box osteotome, canal finder, and then lateralizer.  Sequentially broached.  After multiple trial reductions, leg lengths were equal by palpation.   Appropriate shuck.  The hip was stable throughout a full range of motion using a +4 DM 46 head including full extension external rotation, sleeping position, and flexed to 90 deg with over 75 degrees internal rotation.  The stem was implanted with cement restrictor and modern cement technique in approximately 5 degrees of increased anteversion as trialed for stability.  After again trialing the DM construct implanted after stability exam repeated on cleaned and dried srivastava taper.  The hip was relocated.  The wound was was washed with Rush betadine protocol and then irrigated with antibiotic irrigating solution.  The rotators and capsule were reattached to the trochanter through drill holes.  The wound was then irrigated thoroughly and closed in  layers over drained with #1 Vicryl, 2-0 Vicryl, 2-0 Stratafix and exofin glue.  A sterile dressing was applied.  There were no intraoperative complications.  Blood loss was 300 cc.  Sponge and needle counts were correct and the patient was returned to the recovery room in stable condition.     Post Op Plan:  1.)  WBAT with Posterior hip precautions  2.)  Ancef x 24 hours  3.)  DVT prophylaxis SCDs with ASA EC 325mg PO qday x 6 weeks   4.)  Keep dressing c/d/i x 2 weeks, OK to shower  5.)  Hip abduction pillow  6.)  PACU x-rays  7.)  Follow biopsy results     Follow Up:  1.)  2 week wound check with AP pelvis and cross table lateral hip  2.)  8 weeks with AP pelvis and cross table lateral hip     [facility]  [facility]     Monday 1-5 PM  and Thursday 7:30-12:00 AM\  [address] [place] [address]  [phone]     [phone]     Or     Tuesday 7:30-5 PM  [address]   [address]  [place], [place]        Lito Basilio M.D.

## 2022-03-24 NOTE — ANESTHESIA POSTPROCEDURE EVALUATION
Patient: Meir Richardson    Procedure: Procedure(s):  removal of hardware right femoral neck to right total hip arthroplasty ; posterior approach       Anesthesia Type:  General    Note:  Disposition: Inpatient   Postop Pain Control: Uneventful            Sign Out: Well controlled pain   PONV: No   Neuro/Psych: Uneventful            Sign Out: Acceptable/Baseline neuro status   Airway/Respiratory: Uneventful            Sign Out: Acceptable/Baseline resp. status   CV/Hemodynamics: Uneventful            Sign Out: Acceptable CV status   Other NRE:    DID A NON-ROUTINE EVENT OCCUR? No           Last vitals:  Vitals Value Taken Time   /70 03/24/22 1720   Temp     Pulse 84 03/24/22 1725   Resp 9 03/24/22 1725   SpO2 100 % 03/24/22 1725   Vitals shown include unvalidated device data.    Electronically Signed By: Shameka Merrill  March 24, 2022  5:26 PM

## 2022-03-24 NOTE — OR NURSING
"LUIS ALFREDO Merrill stopped by to assess pt and said that \"he should get his parkinsons med as soon as possible so his tremors do not get worse\". This RN researching to obtain this med for when pt is alert enough to take the dose. Charge Nurse aware.  "

## 2022-03-24 NOTE — ANESTHESIA PROCEDURE NOTES
Airway       Patient location during procedure: OR  Staff -        Anesthesiologist:  Jac Gold MD       CRNA: Edelmira Ag APRN CRNA       Performed By: CRNA  Consent for Airway        Urgency: elective  Indications and Patient Condition       Indications for airway management: gama-procedural       Induction type:intravenous       Mask difficulty assessment: 1 - vent by mask    Final Airway Details       Final airway type: endotracheal airway       Successful airway: ETT - single  Endotracheal Airway Details        ETT size (mm): 8.0       Cuffed: yes       Successful intubation technique: direct laryngoscopy       DL Blade Type: Perkins 2       Grade View of Cords: 1       Adjucts: stylet       Position: Right       Measured from: lips       Secured at (cm): 24       Bite block used: None    Post intubation assessment        Placement verified by: capnometry, equal breath sounds and chest rise        Number of attempts at approach: 1       Number of other approaches attempted: 0       Secured with: pink tape       Ease of procedure: easy       Dentition: Intact and Unchanged

## 2022-03-24 NOTE — OR NURSING
LUIS ALFREDO Merrill gave OK for pt to go to inpt bed 2305 from PACU. Pt has tolerated ice chips, ice water, and apple sauce to take his parkinsons med.

## 2022-03-24 NOTE — ANESTHESIA PREPROCEDURE EVALUATION
Anesthesia Pre-Procedure Evaluation    Patient: Meir Richardson   MRN: [id number] : 1950        Procedure : Procedure(s):  removal of hardware right femoral neck to right total hip arthroplasty ; posterior approach          Past Medical History:   Diagnosis Date     Behcet's syndrome (H)     diagnosed . Resolved     Benign essential tremor      Irregular heart beat     PVC's     Migraine      Parkinsons disease (H)      Paroxysmal atrial tachycardia (H)     s/p ablation     PONV (postoperative nausea and vomiting)      Scapular dyskinesis      Syncope      Vitamin B 12 deficiency       Past Surgical History:   Procedure Laterality Date     AV NODE ABLATION  2018    for atrial tachycardia     CARDIAC SURGERY      ablation     COLONOSCOPY       HAND SURGERY Right 2020     HERNIA REPAIR       OPEN REDUCTION INTERNAL FIXATION HIP NAILING Right 2022    Procedure: OPEN REDUCTION INTERNAL FIXATION, RIGHT HIP.;  Surgeon: Lito Basilio MD;  Location: [facility] OR     OPEN REDUCTION INTERNAL FIXATION TOE(S) Right 2017    Procedure: OPEN REDUCTION INTERNAL FIXATION TOE(S);  RIGHT FIRST MTP FUSION, FOREFOOR RECONSTRUCTION;  Surgeon: Yfn Ulloa MD;  Location: [facility] OR      Allergies   Allergen Reactions     Erythromycin Hives     Other reaction(s): Angioedema  Tolerated azithromycin.        Dronedarone Other (See Comments)      shakes, difficulty sleeping and high anxiety.   shakes, difficulty sleeping and high anxiety.   shakes, difficulty sleeping and high anxiety.       Flecainide Other (See Comments)     Other reaction(s): Tremors  Reported after 2nd dose also mentioned concurrent migraine (has a hx of migraines prior to flecainide) CRUZ MORRISON   Reported after 2nd dose also mentioned concurrent migraine (has a hx of migraines prior to flecainide) CRUZ MORRISON   Reported after 2nd dose also mentioned concurrent migraine (has a hx of migraines prior to flecainide) CRUZ MORRISON        Keflex [Cephalexin] Rash      Penicillins Rash     Sulfa Drugs Rash     Tetracycline Rash      Social History     Tobacco Use     Smoking status: Never Smoker     Smokeless tobacco: Never Used   Substance Use Topics     Alcohol use: No      Wt Readings from Last 1 Encounters:   05/11/17 77.4 kg (170 lb 11.2 oz)        Prior to Admission medications    Medication Sig Start Date End Date Taking? Authorizing Provider   acetaminophen (TYLENOL) 325 MG tablet Take 2 tablets (650 mg) by mouth every 6 hours as needed for mild pain 5/11/17   Yfn Ulloa MD   aspirin (ASA) 325 MG EC tablet Take 1 tablet (325 mg) by mouth daily 2/24/22   Shameka Zacarias PA-C   carbidopa (LODOSYN) 25 MG TABS tablet Take 25 mg by mouth 3 times daily    Unknown, Entered By History   carbidopa-levodopa (SINEMET)  MG per tablet Take 1.5 tablets by mouth 3 times daily With food    Reported, Patient   citalopram (CELEXA) 20 MG tablet Take 20 mg by mouth daily    Unknown, Entered By History   cyclobenzaprine (FLEXERIL) 10 MG tablet Take 10 mg by mouth daily    Unknown, Entered By History   droxidopa (NORTHERA) 100 MG capsule Take 200 mg by mouth 3 times daily    Unknown, Entered By History   ergotamine-caffeine (CAFERGOT) 1-100 MG tablet Take 2 tablets by mouth daily as needed for headaches or migraine Two tablets at onset of attack; then 1 tablet every 30 minutes as needed; maximum: 6 tablets per attack; do not exceed 10 tablets/week.    Unknown, Entered By History   ibuprofen (ADVIL/MOTRIN) 600 MG tablet Take 1 tablet (600 mg) by mouth every 6 hours as needed for other (inflammatory pain) 5/11/17   Yfn Ulloa MD   isometheptene-dichloralphenazone-acetaminophen (MIDRIN) -325 MG per capsule Take 1 capsule by mouth 4 times daily as needed for headaches    Reported, Patient   midodrine (PROAMATINE) 5 MG tablet Take 15 mg by mouth 3 times daily    Unknown, Entered By History   order for DME ADJUSTABLE LIGHTWEIGHT WALKER 5/12/17   Yfn Ulloa,  MD   oxyCODONE (ROXICODONE) 5 MG tablet Take 0.5-1 tablets (2.5-5 mg) by mouth every 4 hours as needed for moderate to severe pain 2/23/22   Shameka Zacarias PA-C   Probiotic Product (PROBIOTIC DAILY PO) Take 1 capsule by mouth every evening     Reported, Patient   sennosides (SENOKOT) 8.6 MG tablet Take 1 tablet by mouth 2 times daily as needed for constipation 2/23/22   Shameka Zacarias PA-C   SUMAtriptan Succinate (IMITREX PO) Take 50 mg by mouth daily as needed for migraine     Reported, Patient   SUMAtriptan Succinate Refill (IMITREX) 6 MG/0.5ML SOCT Inject 6 mg Subcutaneous daily as needed for migraine     Reported, Patient   vitamin D3 (CHOLECALCIFEROL) 50 mcg (2000 units) tablet Take 1 tablet (50 mcg) by mouth daily 2/23/22   Anjelica Quijano MD     No current Epic-ordered facility-administered medications on file.     Current Outpatient Medications Ordered in Epic   Medication     acetaminophen (TYLENOL) 325 MG tablet     aspirin (ASA) 325 MG EC tablet     carbidopa (LODOSYN) 25 MG TABS tablet     carbidopa-levodopa (SINEMET)  MG per tablet     citalopram (CELEXA) 20 MG tablet     cyclobenzaprine (FLEXERIL) 10 MG tablet     droxidopa (NORTHERA) 100 MG capsule     ergotamine-caffeine (CAFERGOT) 1-100 MG tablet     ibuprofen (ADVIL/MOTRIN) 600 MG tablet     isometheptene-dichloralphenazone-acetaminophen (MIDRIN) -325 MG per capsule     midodrine (PROAMATINE) 5 MG tablet     order for DME     oxyCODONE (ROXICODONE) 5 MG tablet     Probiotic Product (PROBIOTIC DAILY PO)     sennosides (SENOKOT) 8.6 MG tablet     SUMAtriptan Succinate (IMITREX PO)     SUMAtriptan Succinate Refill (IMITREX) 6 MG/0.5ML SOCT     vitamin D3 (CHOLECALCIFEROL) 50 mcg (2000 units) tablet     Recent Results (from the past 744 hour(s))   XR Surgery TENA Fluoro L/T 5 Min w Stills    Narrative    SURGERY C-ARM FLUORO LESS THAN 5 MINUTES WITH STILLS 2/22/2022 4:15 PM      HISTORY: Open reduction internal fixation, right  hip.    NUMBER OF IMAGES ACQUIRED: 3    FLUOROSCOPY TIME: 0.6 minute      Impression    IMPRESSION: Three intraoperative fluoroscopic spot images are provided  for review during nailing of the right proximal femur. See operative  summary if further details are needed.    KENYA DURBIN MD         SYSTEM ID:  [id number]       Anesthesia Evaluation   Pt has had prior anesthetic. Type: General (Glidescope 4 = Grade 1 View previously).    History of anesthetic complications  - PONV.      ROS/MED HX  ENT/Pulmonary:    (-) asthma and sleep apnea   Neurologic: Comment: Tremor    (+) Parkinson's disease,  (-) no seizures and no CVA   Cardiovascular: Comment: Irregular heart beat PVC's  hx PAT s/p ablation.  Orthostatic hypotension with recurrent syncope.    Echo:  Final Impressions:    1. Normal LV size, normal wall thickness, normal function with an estimated EF of 65 - 70%.    2. Right ventricular cavity size is normal, global systolic RV function is normal.    3. No significant valve disease detected.    4. Normal estimated PA pressure.    5. IVC geometry suggests normal to low CVP.        (+) -----fainting (syncope). dysrhythmias (s/p Ablation), Other, Irregular Heartbeat/Palpitations, Previous cardiac testing   Echo: Date: Results:    Stress Test: Date: Results:    ECG Reviewed: Date: Results:  NSR, no significant abnormalities  Cath: Date: Results:   (-) angina, hypertension, CAD, angina, murmur and wheezes   METS/Exercise Tolerance:     Hematologic:     (+) anemia,     Musculoskeletal: Comment: Behcet's syndrome (H) diagnosed 1970's. Resolved.  Scapular dyskinesis.  Incomplete femoral neck fracture, nontraumatic.      GI/Hepatic:     (+) GERD (diet-controlled), Other,  (-) liver disease   Renal/Genitourinary: Comment: Cr. 1.34    (+) renal disease, type: CRI,     Endo: Comment: Vit. B12   (-) Type II DM, thyroid disease and chronic steroid usage   Psychiatric/Substance Use:  - neg psychiatric ROS     Infectious  Disease:  - neg infectious disease ROS     Malignancy:  - neg malignancy ROS     Other:  - neg other ROS          Physical Exam    Airway        Mallampati: II   TM distance: > 3 FB   Neck ROM: full   Mouth opening: > 3 cm    Respiratory Devices and Support         Dental  no notable dental history         Cardiovascular          Rhythm and rate: regular and normal (-) no murmur    Pulmonary           breath sounds clear to auscultation   (-) no rhonchi and no wheezes        OUTSIDE LABS:  CBC:   Lab Results   Component Value Date    WBC 3.1 (L) 02/21/2022    HGB 10.0 (L) 02/23/2022    HGB 10.3 (L) 02/21/2022    HCT 31.6 (L) 02/21/2022     02/21/2022     BMP:   Lab Results   Component Value Date     02/23/2022     02/21/2022    POTASSIUM 4.2 02/23/2022    POTASSIUM 4.2 02/21/2022    CHLORIDE 104 02/23/2022    CHLORIDE 103 02/21/2022    CO2 29 02/23/2022    CO2 30 02/21/2022    BUN 29 02/23/2022    BUN 32 (H) 02/21/2022    CR 1.44 (H) 02/23/2022    CR 1.34 (H) 02/21/2022    GLC 93 02/23/2022     (H) 02/21/2022     COAGS:   Lab Results   Component Value Date    INR 1.04 02/21/2022     POC:   Lab Results   Component Value Date    BGM 99 05/12/2017     OTHER:   Lab Results   Component Value Date    SAMUEL 9.0 02/23/2022       Anesthesia Plan    ASA Status:  3   NPO Status:  NPO Appropriate    Anesthesia Type: General.     - Airway: ETT   Induction: Propofol, Intravenous.   Maintenance: Balanced.        Consents    Anesthesia Plan(s) and associated risks, benefits, and realistic alternatives discussed. Questions answered and patient/representative(s) expressed understanding.    - Discussed:     - Discussed with:  Patient         Postoperative Care    Pain management: Multi-modal analgesia.   PONV prophylaxis: Ondansetron (or other 5HT-3), Dexamethasone or Solumedrol, Background Propofol Infusion     Comments:                Fareed Shelton MD

## 2022-03-24 NOTE — ANESTHESIA CARE TRANSFER NOTE
Patient: Meir Richardson    Procedure: Procedure(s):  removal of hardware right femoral neck to right total hip arthroplasty ; posterior approach       Diagnosis: Hip fracture, right (H) [S72.001A]  Diagnosis Additional Information: No value filed.    Anesthesia Type:   General     Note:    Oropharynx: oropharynx clear of all foreign objects  Level of Consciousness: awake  Oxygen Supplementation: face mask  Level of Supplemental Oxygen (L/min / FiO2): 10  Independent Airway: airway patency satisfactory and stable  Dentition: dentition unchanged  Vital Signs Stable: post-procedure vital signs reviewed and stable  Report to RN Given: handoff report given  Patient transferred to: PACU    Handoff Report: Identifed the Patient, Identified the Reponsible Provider, Reviewed the pertinent medical history, Discussed the surgical course, Reviewed Intra-OP anesthesia mangement and issues during anesthesia, Set expectations for post-procedure period and Allowed opportunity for questions and acknowledgement of understanding      Vitals:  Vitals Value Taken Time   BP     Temp     Pulse 86 03/24/22 1645   Resp 12 03/24/22 1645   SpO2 100 % 03/24/22 1645   Vitals shown include unvalidated device data.    Electronically Signed By: MANUEL Jasso CRNA  March 24, 2022  4:46 PM

## 2022-03-25 NOTE — PROGRESS NOTES
03/25/22 0946   Quick Adds   Type of Visit Initial PT Evaluation   Living Environment   People in Home spouse   Current Living Arrangements house   Home Accessibility stairs to enter home;stairs within home   Number of Stairs, Main Entrance   (13)   Stair Railings, Main Entrance none   Number of Stairs, Within Home, Primary greater than 10 stairs  (13)   Stair Railings, Within Home, Primary railings on both sides of stairs   Transportation Anticipated family or friend will provide   Living Environment Comments Pt lives w/ his wife in a house, lots of stairs to enter. Pt reports his wife can provide minimal assist   Self-Care   Usual Activity Tolerance moderate   Current Activity Tolerance poor   Equipment Currently Used at Home walker, standard;cane, straight;grab bar, toilet;grab bar, tub/shower;shower chair   Fall history within last six months yes   Number of times patient has fallen within last six months 2   Activity/Exercise/Self-Care Comment Pt IND at baseline, but has been using a FWW since his hip fx in Feb. Pt reports his wife helps him get out of bed and is always w/ him when he takes the stairs   General Information   Onset of Illness/Injury or Date of Surgery 03/24/22   Referring Physician Lito Basilio MD   Patient/Family Therapy Goals Statement (PT) Return home with his wife   Pertinent History of Current Problem (include personal factors and/or comorbidities that impact the POC) Pt is a 70 y/o male POD #1 following R GILDARDO due to Incomplete right femoral neck fracture, nontraumatic, likely pathologic/insufficiency fracture s/p percutaneous pin of right hip (2/22/22) with failed fixation. PMH including Behcets disease, parkinson's disease, paroxysmal atrial tachycardia s/p ablation, CKD, orthostatic hypotension with recurrent syncope.    Existing Precautions/Restrictions no hip IR;no hip ADD past midline;90 degree hip flexion   Weight-Bearing Status - RLE weight-bearing as tolerated   General  Observations Pt supine in bed upon therapist arrival   Cognition   Affect/Mental Status (Cognition) WNL   Orientation Status (Cognition) oriented x 3   Pain Assessment   Patient Currently in Pain   (8/10 R hip)   Integumentary/Edema   Integumentary/Edema Comments Incision not observed, covered by bandage   Posture    Posture Forward head position;Protracted shoulders   Range of Motion (ROM)   Range of Motion ROM deficits secondary to surgical procedure   Strength (Manual Muscle Testing)   Strength (Manual Muscle Testing) Deficits observed during functional mobility   Strength Comments Unable to perform B SLR   Bed Mobility   Comment, (Bed Mobility) Supine to sit Max A x2   Transfers   Comment, (Transfers) NT   Gait/Stairs (Locomotion)   Comment, (Gait/Stairs) NT   Balance   Balance Comments Moderate standing balance   Sensory Examination   Sensory Perception patient reports no sensory changes   Clinical Impression   Criteria for Skilled Therapeutic Intervention Yes, treatment indicated   PT Diagnosis (PT) Impaired functional mobility   Influenced by the following impairments Pain, decreased strength, decreased ROM   Functional limitations due to impairments Limited functional mobility requiring assist   Clinical Presentation (PT Evaluation Complexity) Stable/Uncomplicated   Clinical Presentation Rationale Based on PMH, current status, and social support   Clinical Decision Making (Complexity) low complexity   Planned Therapy Interventions (PT) balance training;bed mobility training;gait training;home exercise program;stair training;strengthening;transfer training   Risk & Benefits of therapy have been explained patient   PT Discharge Planning   PT Rationale for DC Rec Pt is significantly below baseline, currently requires Max A x2 for bed mobility and use of a lift w/ A x2 for transfers. Pt has many stairs to navigate at home and his wife can only provide minimal assist. Recommmend TCU to increase strength and  improve functional mobility.   PT Brief overview of current status Bed mob Max Ax2, transfers w/ lift and A x2   Plan of Care Review   Plan of Care Reviewed With patient   Total Evaluation Time   Total Evaluation Time (Minutes) 10   Physical Therapy Goals   PT Frequency 6x/week   PT Predicated Duration/Target Date for Goal Attainment 03/27/22   PT: Bed Mobility Supine to/from sit;Minimal assist   PT: Transfers Sit to/from stand;Minimal assist   PT: Gait Minimal assist;Assistive device;50 feet   PT: Stairs Moderate assist;Greater than 10 stairs

## 2022-03-25 NOTE — PROVIDER NOTIFICATION
Notified hospitalist that BP has been 80s/50s. Gave midodrine as ordered, rechecked an hour later, still 80s/50s. No complaints of dizziness or other symptoms.  Pt laying in bed.

## 2022-03-25 NOTE — PROGRESS NOTES
[facility]    Hospitalist Progress Note    Assessment & Plan   Meir Richardson is a 71 year old adult who was admitted on 3/24/2022.     Past medical history significant for Behcets disease, parkinson's disease, paroxysmal atrial tachycardia s/p ablation, CKD, orthostatic hypotension with recurrent syncope and recent incomplete right femoral neck fracture (nontraumatic, pathologic/insufficiency fracture) s/p percutaneous pin of right hip (2/22/22) who was admitted by Ortho 3/24/22 for failed fixation and underwent conversion to right total hip arthroplasty with Dr. Basilio. Hospitalist service consulted for medical co-management.      Incomplete right femoral neck fracture, nontraumatic, likely pathologic/insufficiency fracture s/p percutaneous pin of right hip (2/22/22) with failed fixation s/p conversion to right total hip arthroplasty (3/24/22): Surgery per Dr. Basilio. General anesthesia used.   * From most recent admission: Hx Behcets disease and required steroids >40 years ago in the 1970s. He reports recently had a taper of steroids for prolonged headache. He was seen in [facility] 2/20 for acute onset hip pain/groin pain which presented abruptly with standing. No falls or trauma. Suspect etiology is insufficiency fracture. Of note, he had a whole body scan 1 year ago for history of prostate cancer and showed no metastases.   -- Defer routine post-operative cares, IVF, DVT prophylaxis and pain control to primary service    - WBAT with Posterior hip precautions   - Ancef x 24 hours   - DVT prophylaxis SCDs with ASA EC 325mg PO qday x 6 weeks    - Keep dressing c/d/i x 2 weeks, OK to shower   - Hip abduction pillow   - Follow biopsy results   - Follow Up: 2 week wound check with AP pelvis and cross table lateral hip. 8 weeks with AP pelvis and cross table lateral hip  -- Encourage incentive spirometer  -- Bowel regimen in place while on narcotics   -- PT and OT in the AM   -- CBC  and BMP ordered today     Acute on chronic anemia  Suspected multifactorial (surgical blood loss and dilutional effect from IV fluids).    *HGB: 9.8-->7.9.    - Monitor.      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 On midodrine. Follows with PCP and cardiology closely as well as neurology. Presyncope/syncope last 1 month ago. Lightheaded with ambulation and especially stairs.   * Receives 2L NS at infusion clinic Mondays, Wednesdays, and Fridays (Fri he has 1 L NS and 1L MVI)  - MIVF  - Continue PTA regimen including midodrine, droxidopa (wife will need to bring from home), Lodosyn, Sinemet  - Monitor closely  - Fall precautions      Hx PAT s/p ablation  Follows at [facility].   Last ECHO: 8/2021: EF 65-70%  Recent coronary CTA with calcium score of 1  - F/u with cardiology as an outpatient     Parkinsons disease  Follows closely with neurology.  - Resume PTA regimen including Lodosyn, Sinemet, droxidopa     Chronic kidney disease, stage 3a  Baseline Cr 1.3-1.5.   - Avoid nephrotoxins - contrast, NSAIDs  - Renally dose medications as able/needed  - Monitor     Anemia   Baseline appears 10-11 range.   - Monitor    Hx Behcets disease  Historically with lesions to eyes, mouth, penile region. Currently inactive, over 40 years ago required long term steroids but has not for many years.      Other pertinent history and chronic stable diagnoses: Appropriate prior to admission medications will be resumed.   Vitamin B12 deficiency  Benign essential tremor  Migraines  Prostate cancer     Asymptomatic COVID-19 admission screening PCR:  Negative at admission  - COVID-19 vaccination status: Fully vaccinated and boosted with Pfizer.  - Influenza vaccination status: 11/2021      Diet: Advance Diet as Tolerated: Regular Diet Adult     DVT Prophylaxis: Defer to primary service  (PCDs and ASA  mg/d for 6 weeks)   Coker Catheter: PRESENT, indication: Anesthesia  Code Status: No CPR- Do NOT Intubate     Disposition Plan    Per Ortho.       Entered: Conrado Beaulieu PA-C 03/25/2022, 11:34 AM        The patient's care was discussed with the Patient.      The patient has been discussed with Dr. Hines, who agrees with the assessment and plan at this time.    Conrado Beaulieu PA-C  [facility]  Securely message with the Vocera Web Console (learn more here)  Text page via [facility] Paging/Directory      Interval History   Patient was resting in bed upon arrival.  He complained of pain in the right lower extremity that seems to be worse with the PCD's.  He stated he just received some pain medications.      He denied fever, chills, chest pain, shortness of breath.  He had no questions at this time.      -Data reviewed today: I reviewed all new labs and imaging results over the last 24 hours. I personally reviewed no images or EKG's today.    Physical Exam   /79 (BP Location: Right arm)   Pulse 99   Temp 98.4  F (36.9  C) (Oral)   Resp 16   Ht 1.829 m (6')   Wt 65.8 kg (145 lb)   SpO2 94%   BMI 19.67 kg/m      Vital Signs with Ranges  Temp:  [97.7  F (36.5  C)-98.7  F (37.1  C)] 98  F (36.7  C)  Pulse:  [] 94  Resp:  [10-19] 16  BP: ()/(61-94) 106/73  SpO2:  [96 %-100 %] 98 %  I/O last 3 completed shifts:  In: 1730 [P.O.:180; I.V.:1300]  Out: 850 [Urine:550; Blood:300]      Constitutional: Patient was awake, alert, cooperative.  He was seen grimacing out in pain.    ENT: Normocephalic, without obvious abnormality, atraumatic, oral pharynx with moist mucus membranes, tonsils without erythema or exudates.  Neck: Supple, symmetrical, trachea midline, no adenopathy.  Pulmonary: No increased work of breathing, fair air exchange, clear to auscultation bilaterally, no crackles or wheezing.  Cardiovascular: Regular rate and rhythm, normal  S1 and S2, no S3 or S4, and no murmur noted.  GI: Hypoactive bowel sounds, soft, non-distended, non-tender.  Skin/Integumen: Visualized skin appeared clear.  Neuro: CN II-XII grossly intact.  Psych:  Alert and oriented x 3. Normal affect.  Extremities: No lower extremity edema noted, and calves are non-TTP bilaterally. PCD's in place bilaterally.    : Coker catheter in place with urine present in the bag.        Medications     lactated ringers 100 mL/hr at 03/25/22 0111       acetaminophen  975 mg Oral Q8H     aspirin  325 mg Oral Daily     carbidopa  25 mg Oral TID     carbidopa-levodopa  1 tablet Oral TID     carbidopa-levodopa  1 half-tab Oral TID     citalopram  20 mg Oral QAM     midodrine  15 mg Oral TID     polyethylene glycol  17 g Oral Daily     senna-docusate  1 tablet Oral BID     sodium chloride (PF)  3 mL Intracatheter Q8H     vancomycin  1,000 mg Intravenous Once     vitamin D3  50 mcg Oral Daily       Data   Recent Labs   Lab 03/25/22  0732 03/25/22  0544 03/24/22  1140   HGB 7.9*  --  9.8*   GLC  --  100*  --        Recent Results (from the past 24 hour(s))   XR Pelvis w Hip Port Right 1 View    Narrative    EXAM: XR PELVIS AD HIP PORTABLE RIGHT 1 VIEW  LOCATION: [facility]  DATE/TIME: 3/24/2022 5:04 PM    INDICATION: Status post right GILDARDO.    COMPARISON: None.      Impression    IMPRESSION: Postoperative changes of right total hip arthroplasty. The components appear well-seated. Postprocedural air within the joint and surrounding soft tissues. Degenerative changes left hip joint. No evidence for fracture. Diffuse   demineralization. Pelvis negative for fracture.

## 2022-03-25 NOTE — PLAN OF CARE
OT: Orders received. Chart reviewed and discussed with care team.  OT not indicated due to pt needing A of 2 for transfers, will discharge to TCU. Will defer OT to next level of care to allow time for medical healing and progress mobility prior to initiation of OT..  Defer discharge recommendations to medical team.  Will complete orders.

## 2022-03-25 NOTE — CONSULTS
[facility]  Consult Note - Hospitalist Service  Date of Admission:  3/24/2022  Consult Requested by: Dr. Basilio  Reason for Consult: Post-op medical co-mangement     Assessment & Plan   Meir Richardson is a 71 year old adult with PMH significant for Behcets disease, parkinson's disease, paroxysmal atrial tachycardia s/p ablation, CKD, orthostatic hypotension with recurrent syncope and recent incomplete right femoral neck fracture (nontraumatic, pathologic/insufficiency fracture) s/p percutaneous pin of right hip (2/22/22) who was admitted by Ortho 3/24/22 for failed fixation and underwent conversion to right total hip arthroplasty with Dr. Basilio. Hospitalist service consulted for medical co-management.      Incomplete right femoral neck fracture, nontraumatic, likely pathologic/insufficiency fracture s/p percutaneous pin of right hip (2/22/22) with failed fixation s/p conversion to right total hip arthroplasty (3/24/22): Surgery per Dr. Basilio. General anesthesia used.   * From most recent admission: Hx Behcets disease and required steroids >40 years ago in the 1970s. He reports recently had a taper of steroids for prolonged headache. He was seen in [facility] 2/20 for acute onset hip pain/groin pain which presented abruptly with standing. No falls or trauma. Suspect etiology is insufficiency fracture. Of note, he had a whole body scan 1 year ago for history of prostate cancer and showed no metastases.   -- Defer routine post-operative cares, IVF, DVT prophylaxis and pain control to primary service    - WBAT with Posterior hip precautions   - Ancef x 24 hours   - DVT prophylaxis SCDs with ASA EC 325mg PO qday x 6 weeks    - Keep dressing c/d/i x 2 weeks, OK to shower   - Hip abduction pillow   - Follow biopsy results   - Follow Up: 2 week wound check with AP pelvis and cross table lateral hip. 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 On midodrine. Follows with PCP and cardiology closely as well as neurology. Presyncope/syncope last 1 month ago. Lightheaded with ambulation and especially stairs.   * Receives 2L NS at infusion clinic Mondays, Wednesdays, and Fridays (Fri he has 1 L NS and 1L MVI)  - MIVF  - Continue PTA regimen including midodrine, droxidopa (wife will need to bring from home), Lodosyn, Sinemet  - Monitor closely  - Fall precautions      Hx PAT s/p ablation  Follows at [facility].   Last ECHO: 8/2021: EF 65-70%  Recent coronary CTA with calcium score of 1  - F/u with cardiology as an outpatient     Parkinsons disease  Follows closely with neurology.  - Resume PTA regimen including Lodosyn, Sinemet, droxidopa     Chronic kidney disease, stage 3a  Baseline Cr 1.3-1.5.   - Avoid nephrotoxins - contrast, NSAIDs  - Renally dose medications as able/needed  - Monitor     Anemia   Baseline appears 10-11 range.   - Monitor    Recent Labs   Lab 03/24/22  1140   HGB 9.8*      Hx Behcets disease  Historically with lesions to eyes, mouth, penile region. Currently inactive, over 40 years ago required long term steroids but has not for many years.      Other pertinent history and chronic stable diagnoses: Appropriate prior to admission medications will be resumed.   Vitamin B12 deficiency  Benign essential tremor  Migraines  Prostate cancer     Asymptomatic COVID-19 admission screening PCR:  Negative at admission  - COVID-19 vaccination status: Fully vaccinated and boosted with Pfizer.  - Influenza vaccination status: 11/2021     The patient's care was discussed with the Attending Physician, Dr. Todd, Bedside Nurse,  Patient and Patient's Family.    Tiffany Bowers PA-C  [facility]  Securely message with the [employer] Web Console (learn more here)  Text page via [employer] Paging/Directory   _________________________________________________________________    Chief Complaint   Hip fracture    History is obtained from the patient, pt's family, and chart review    History of Present Illness   Meir Richardson is a 71 year old adult with PMH significant for Behcets disease, parkinson's disease, paroxysmal atrial tachycardia s/p ablation, CKD, orthostatic hypotension with recurrent syncope and recent incomplete right femoral neck fracture (nontraumatic, pathologic/insufficiency fracture) s/p percutaneous pin of right hip (2/22/22) who was admitted by Ortho 3/24/22 for failed fixation and underwent conversion to right total hip arthroplasty with Dr. Basilio. Hospitalist service consulted for medical co-management.     Surgery per Dr. Basilio. General anesthesia used. In the post-op period, pt complaining of significant pain. No other acute sx. No recent medication changes. Wife and daughter at beside, supply hx. Discharge summary from recent hospitalization reviewed.     Review of Systems   The 10 point Review of Systems is negative other than noted in the HPI .    Past Medical History    I have reviewed this patient's medical history and updated it with pertinent information if needed.   Past Medical History:   Diagnosis Date     Behcet's syndrome (H)     diagnosed 1970's. Resolved     Benign essential tremor      Irregular heart beat     PVC's     Migraine      Parkinsons disease (H)      Paroxysmal atrial tachycardia (H)     s/p ablation     PONV (postoperative nausea and vomiting)      Scapular dyskinesis      Syncope      Vitamin B 12 deficiency      Past Surgical History   I have reviewed this patient's surgical history and updated it with pertinent information if needed.  Past  Surgical History:   Procedure Laterality Date     AV NODE ABLATION  2018    for atrial tachycardia     CARDIAC SURGERY      ablation     COLONOSCOPY       HAND SURGERY Right 09/2020     HERNIA REPAIR       OPEN REDUCTION INTERNAL FIXATION HIP NAILING Right 2/22/2022    Procedure: OPEN REDUCTION INTERNAL FIXATION, RIGHT HIP.;  Surgeon: Lito Basilio MD;  Location:  OR     OPEN REDUCTION INTERNAL FIXATION TOE(S) Right 05/11/2017    Procedure: OPEN REDUCTION INTERNAL FIXATION TOE(S);  RIGHT FIRST MTP FUSION, FOREFOOR RECONSTRUCTION;  Surgeon: Yfn Ulloa MD;  Location:  OR     Social History   I have reviewed this patient's social history and updated it with pertinent information if needed.  Social History     Tobacco Use     Smoking status: Never Smoker     Smokeless tobacco: Never Used   Vaping Use     Vaping Use: Never used   Substance Use Topics     Alcohol use: No     Drug use: No     Family History   Reviewed and non-contributory to current chief complaint.     Medications   Medications Prior to Admission   Medication Sig Dispense Refill Last Dose     acetaminophen (TYLENOL) 500 MG tablet Take 500-1,000 mg by mouth every 6 hours as needed for mild pain   3/23/2022 at pm     aspirin (ASA) 81 MG EC tablet Take 81 mg by mouth daily   3/23/2022 at am     carbidopa (LODOSYN) 25 MG TABS tablet Take 25 mg by mouth 3 times daily   3/24/2022 at am     carbidopa-levodopa (SINEMET)  MG per tablet Take 1.5 tablets by mouth 3 times daily With food   3/24/2022 at am     carbidopa-levodopa (SINEMET)  MG tablet Take 1 tablet by mouth every evening as needed    at prn     citalopram (CELEXA) 20 MG tablet Take 20 mg by mouth every morning    3/24/2022 at am     cyclobenzaprine (FLEXERIL) 10 MG tablet Take 10 mg by mouth every morning    3/24/2022 at am     droxidopa (NORTHERA) 100 MG capsule Take 200 mg by mouth 3 times daily   3/24/2022 at am     ergotamine-caffeine (CAFERGOT) 1-100 MG tablet Take 2  tablets by mouth daily as needed for headaches or migraine Two tablets at onset of attack; then 1 tablet every 30 minutes as needed; maximum: 6 tablets per attack; do not exceed 10 tablets/week.    at prn     ibuprofen (ADVIL/MOTRIN) 600 MG tablet Take 1 tablet (600 mg) by mouth every 6 hours as needed for other (inflammatory pain) 75 tablet 0  at prn     LEUPROLIDE ACETATE, 6 MONTH, SC Inject Subcutaneous every 6 months   Past Week at Unknown time     midodrine (PROAMATINE) 5 MG tablet Take 15 mg by mouth 3 times daily   3/24/2022 at am     oxyCODONE (ROXICODONE) 5 MG tablet Take 0.5-1 tablets (2.5-5 mg) by mouth every 4 hours as needed for moderate to severe pain 25 tablet 0  at prn     Probiotic Product (PROBIOTIC DAILY PO) Take 1 capsule by mouth every evening    3/22/2022 at pm     sennosides (SENOKOT) 8.6 MG tablet Take 1 tablet by mouth 2 times daily as needed for constipation (Patient taking differently: Take 1 tablet by mouth 2 times daily ) 20 tablet 0 3/23/2022 at pm     SUMAtriptan (IMITREX) 50 MG tablet Take 50 mg by mouth daily as needed for migraine     at prn     SUMAtriptan Succinate Refill (IMITREX) 6 MG/0.5ML SOCT Inject 6 mg Subcutaneous daily as needed for migraine     at prn     vitamin D3 (CHOLECALCIFEROL) 50 mcg (2000 units) tablet Take 1 tablet (50 mcg) by mouth daily 30 tablet 0 3/23/2022 at am     order for DME ADJUSTABLE LIGHTWEIGHT WALKER 1 Units 0        Allergies   Allergies   Allergen Reactions     Erythromycin Hives     Other reaction(s): Angioedema  Tolerated azithromycin.        Dronedarone Other (See Comments)      shakes, difficulty sleeping and high anxiety.   shakes, difficulty sleeping and high anxiety.   shakes, difficulty sleeping and high anxiety.       Flecainide Other (See Comments)     Other reaction(s): Tremors  Reported after 2nd dose also mentioned concurrent migraine (has a hx of migraines prior to flecainide) LW, RN   Reported after 2nd dose also mentioned  concurrent migraine (has a hx of migraines prior to flecainide) CRUZ MORRISON   Reported after 2nd dose also mentioned concurrent migraine (has a hx of migraines prior to flecainide) CRUZ MORRISON        Keflex [Cephalexin] Rash     Penicillins Rash     Sulfa Drugs Rash     Tetracycline Rash       Physical Exam   Vital Signs: Temp: 98  F (36.7  C) Temp src: Oral BP: 106/73 Pulse: 94   Resp: 16 SpO2: 98 % O2 Device: Nasal cannula Oxygen Delivery: 1 LPM  Weight: 145 lbs 0 oz    CONSTITUTIONAL: Pt laying in bed, dressed in hospital garb. Appears comfortable. Cooperative with interview. Accompanied by wife and daughter at bedside.   HEENT: Normocephalic, atraumatic.    CARDIOVASCULAR: RRR, no murmurs, rubs, or extra heart sounds appreciated. Pulses +2/4 and regular in upper and lower extremities, bilaterally.   RESPIRATORY: No increased work of breathing.  Supplemental oxygenation via NC at 1 LPM. CTA, bilat; no wheezes, rales, or rhonchi appreciated.  GASTROINTESTINAL:  Abdomen soft, non-distended. BS auscultated in all four quadrants. Negative for tenderness to  palpation.  No masses or organomegaly noted.  MUSCULOSKELETAL: Surgical site bandaged. No gross deformities noted. Normal muscle tone.   HEMATOLOGIC/LYMPHATIC/IMMUNOLOGIC: Negative for lower extremity edema, bilaterally.  NEUROLOGIC: Alert and oriented to person, place, and time.  strength intact.   SKIN: Warm, dry, intact.     Data   Results for orders placed or performed during the hospital encounter of 03/24/22 (from the past 24 hour(s))   Hemoglobin   Result Value Ref Range    Hemoglobin 9.8 (L) 11.7 - 17.7 g/dL    Narrative    The sex of this patient cannot be reliably determined based on discrepancies in demographics (legal sex, sex assigned at birth, gender identity).  Both male and female reference ranges are provided where applicable.  Careful evaluation of the patient s results as compared to the gender specific reference intervals is required in this setting.     ABO/Rh type and screen    Narrative    The following orders were created for panel order ABO/Rh type and screen.  Procedure                               Abnormality         Status                     ---------                               -----------         ------                     Adult Type and Screen[[id number]]                            Final result                 Please view results for these tests on the individual orders.   Adult Type and Screen   Result Value Ref Range    ABO/RH(D) O POS     Antibody Screen Negative Negative    SPECIMEN EXPIRATION DATE [id number]    XR Pelvis w Hip Port Right 1 View    Narrative    EXAM: XR PELVIS AD HIP PORTABLE RIGHT 1 VIEW  LOCATION: [facility]  DATE/TIME: 3/24/2022 5:04 PM    INDICATION: Status post right GILDARDO.    COMPARISON: None.      Impression    IMPRESSION: Postoperative changes of right total hip arthroplasty. The components appear well-seated. Postprocedural air within the joint and surrounding soft tissues. Degenerative changes left hip joint. No evidence for fracture. Diffuse   demineralization. Pelvis negative for fracture.

## 2022-03-25 NOTE — CONSULTS
Care Management Initial Consult    General Information  Assessment completed with:  SpouseLola       Primary Care Provider verified and updated as needed:   Yes, Fareed Webb  Readmission within the last 30 days:   yes        Advance Care Planning:   No ACP Documents on File        Communication Assessment  Patient's communication style: spoken language (English or Bilingual)    Hearing Difficulty or Deaf: no   Wear Glasses or Blind: yes    Cognitive  Cognitive/Neuro/Behavioral: WDL  Level of Consciousness: alert  Arousal Level: arouses to voice  Orientation: oriented x 4             Living Environment:   People in home: spouse     Current living Arrangements: home       Able to return to prior arrangements:  Questionable. Awaiting how he does with PT. Pre-op plan is home per spouse.       Family/Social Support:  Care provided by: spouseLola  Provides care for:  No one                Description of Support System:    SpouseLola-supportive, involved, caring  Has one daughter who lives locally and helps out as needed.     Current Resources:   Patient receiving home care services:  Yes, Open to [facility] (PT/RN)-this was from his last surgery.     Community Resources:  none  Equipment currently used at home: walker, standard, cane, straight, grab bar, toilet, grab bar, tub/shower, shower chair (Wife is requesting a Wheelchair and a new Walker)  Supplies currently used at home:  none    Employment/Financial:  Employment Status:    Retired      Financial Concerns:  No, has Medical Insurance  Medicare/[employer]          Lifestyle & Psychosocial Needs:  Social Determinants of Health     Tobacco Use: Low Risk      Smoking Tobacco Use: Never Smoker     Smokeless Tobacco Use: Never Used   Alcohol Use: Not on file   Financial Resource Strain: Not on file   Food Insecurity: Not on file   Transportation Needs: Not on file   Physical Activity: Not on file   Stress: Not on file   Social  Connections: Not on file   Intimate Partner Violence: Not on file   Depression: Not on file   Housing Stability: Not on file       Functional Status:  Prior to admission patient needed assistance: No,  per wife, is very independent with all ADL's and mobility. He still drives.             Mental Health Status:    Parkinson's (per wife she feels the only thing that has affected him so far is his blood pressure-hypotension)    Chemical Dependency Status:   No concerns             Values/Beliefs:  Spiritual, Cultural Beliefs, Religion Practices, Values that affect care:   No Judaism noted              Additional Information:  Writer spoke with wife on phone this afternoon regarding discharge planning. Spouse wants patient to return home at discharge as she has discussed with [facility] pre-op. She wants to make sure he can walk and is safe to come home. If he has to go to a TCU she has asked for a referral to go to [place]. Writer checked for [facility] Pre-Op Care Plan and there was none found.  According to PT today 3/25, patient is below baseline and requiring assist of 2 /lift. Case Management will continue to follow along for discharge planning. Will await further direction from ortho. surgeon before referral to a TCU is sent.     Addendum: Spouse wanted to make sure MD knew that her  gets 2 liters of Saline Transfusions through [facility] every Monday/Wednesday/Friday. Writer will let the Hospitalist know. (This is to treat his Hypotension/dehydration).        Nanda Butt, RN  Care Coordinator  [phone]

## 2022-03-25 NOTE — PROGRESS NOTES
Up from PACU at 6:30 pm. Pt is drowsy. VSS on 1L o2 nc. LR infusing at 100 ml/hr. Dressing CDI, Abductor pillow in place. Coker in place for retention.

## 2022-03-25 NOTE — PLAN OF CARE
VSS, CMS intact. Up with 2 and lift. Baseline tremor BUE. Pain managed with oxycodone, robaxin and tylenol. Dressing C/D/I. Coker patent draining adequate amount.  A&OX4.

## 2022-03-26 NOTE — PROGRESS NOTES
Care Management Follow Up    Length of Stay (days): 2    Expected Discharge Date: 03/27/2022     Concerns to be Addressed:    Discharge Planning   Patient plan of care discussed at interdisciplinary rounds: Yes    Anticipated Discharge Disposition:  TCU placement vs home with homecare     Anticipated Discharge Services:  therapy  Anticipated Discharge DME:  N/A    Patient/family educated on Medicare website which has current facility and service quality ratings:  no  Education Provided on the Discharge Plan:  yes  Patient/Family in Agreement with the Plan:  yes    Referrals Placed by CM/SW:  TCU referrals  Private pay costs discussed: Not applicable    Additional Information:  Call placed to patient's wife to discuss discharge plans.  Patient's wife is asking for TCU referrals to be sent to [facility].  Patient's wife is asking for transport to be arranged.  Explained that there is a private room amenity fee of any where from $20-$49 per day and patient's wife is in agreement.  Referrals sent, via discharge on the double, to check bed availability.      Will continue to follow.      ADRIANA Crump, [facility] [place]    [phone]  [facility]

## 2022-03-26 NOTE — PROGRESS NOTES
Orthopedic Surgery  Meir Richardson  3/26/2022  Admit Date:  3/24/2022  POD 2 Days Post-Op  S/P Procedure(s):  removal of hardware right femoral neck to right total hip arthroplasty ; posterior approach    Patient is feeling good.  Pain controlled.  Tolerating oral intake.  No events overnight. Still with melgar, to come out this am.  Would prefer not to go to a TCU but needs to discuss with his wife.  Therapy recommending TCU    Alert and orient to person, place, and time.  Vital Sign Ranges  Temperature Temp  Av.1  F (36.7  C)  Min: 97.7  F (36.5  C)  Max: 98.4  F (36.9  C)   Blood pressure Systolic (24hrs), Av , Min:81 , Max:113        Diastolic (24hrs), Av, Min:57, Max:79      Pulse Pulse  Av.2  Min: 84  Max: 101   Respirations Resp  Av  Min: 16  Max: 18   Pulse oximetry SpO2  Av.5 %  Min: 93 %  Max: 97 %       Right hip dressing is clean, dry, and intact. Minimal erythema of the surrounding skin.  Bilateral calves are soft, non-tender.  right lower extremity is NVI.    Labs:  Recent Labs   Lab Test 22  1202 22  0806 22  2149   POTASSIUM 4.1 4.2 4.2     Recent Labs   Lab Test 22  0844 22  1202 22  0732   HGB 7.9* 7.8* 7.9*     Recent Labs   Lab Test 22  2149   INR 1.04     Recent Labs   Lab Test 22  1202 22  2149    193       A/P  1. S/p revision failed femoral neck fixation to GILDARDO (PL A)    Continue aspirin 325 for DVT prophylaxis.     Mobilize with PT/OT WBAT with PL hip precautions.     Continue current pain regimen.    2. Disposition   Anticipate d/c to TCU pending conversation with wife    Kjerstin L Foss, PA-C

## 2022-03-26 NOTE — PLAN OF CARE
Pod2 R total hip. A&Ox4, confused at times, slow speech. A2 with the yoan lo. PT today. Up in chair x1. R hip pain, controlled with ice, scheduled tylenol, robaxin x1, and oxy x1. Lower Bps, pressor given. Hgb 7.9, MD notified. Regular diet, tolerating well, denies N/V. Coker removed around 1215, due to void. No BM, passing gas, +BS, senna and miralax given. R-hip dressing CDI. Hip precautions maintained.

## 2022-03-26 NOTE — PLAN OF CARE
Date/Time: 3/25/22 5170-3267    Trauma/Ortho/Medical (Choose one): Ortho    Diagnosis: Right total hip  POD#: 2  Mental Status: A&O  Activity/dangle: up with lift  Diet: Regular   Pain: Scheduled Tylenol, PRN Oxy  Coker/Voiding: Coker   Tele/Restraints/Iso:  02/LDA:  D/C Date: Pending progress with PT  Other Info: Hx Parkinson's, tremors to hands and feet, speech is slow and quiet, bolus given for low BP last evening, BP okay overnight, abd firm (pt states this is baseline), positive bowel sounds, passing flatus, gauze dressing to right hip, hip precautions in place

## 2022-03-26 NOTE — PROGRESS NOTES
[facility]    Hospitalist Progress Note    Assessment & Plan   Meir Richardson is a 71 year old adult who was admitted on 3/24/2022.     Past medical history significant for Behcets disease, parkinson's disease, paroxysmal atrial tachycardia s/p ablation, CKD, orthostatic hypotension with recurrent syncope and recent incomplete right femoral neck fracture (nontraumatic, pathologic/insufficiency fracture) s/p percutaneous pin of right hip (2/22/22) who was admitted by Ortho 3/24/22 for failed fixation and underwent conversion to right total hip arthroplasty with Dr. Basilio. Hospitalist service consulted for medical co-management.      Incomplete right femoral neck fracture, nontraumatic, likely pathologic/insufficiency fracture s/p percutaneous pin of right hip (2/22/22) with failed fixation s/p conversion to right total hip arthroplasty (3/24/22): Surgery per Dr. Basilio. General anesthesia used.   * From most recent admission: Hx Behcets disease and required steroids >40 years ago in the 1970s. He reports recently had a taper of steroids for prolonged headache. He was seen in [facility] 2/20 for acute onset hip pain/groin pain which presented abruptly with standing. No falls or trauma. Suspect etiology is insufficiency fracture. Of note, he had a whole body scan 1 year ago for history of prostate cancer and showed no metastases.   -- Defer routine post-operative cares, IVF, DVT prophylaxis and pain control to primary service    - WBAT with Posterior hip precautions   - Ancef x 24 hours   - DVT prophylaxis SCDs with ASA EC 325mg PO qday x 6 weeks    - Keep dressing c/d/i x 2 weeks, OK to shower   - Hip abduction pillow   - Follow biopsy results   - Follow Up: 2 week wound check with AP pelvis and cross table lateral hip. 8 weeks with AP pelvis and cross table lateral hip  -- Encourage incentive spirometer  -- Bowel regimen in place while on narcotics   -- remove Coker today  -- PT/OT-  rec TCU but he wants to go home     Acute on chronic anemia  -- Hn around 10 in 2/2022  Suspected multifactorial (surgical blood loss and dilutional effect from IV fluids).    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 On midodrine. Follows with PCP and cardiology closely as well as neurology. Presyncope/syncope last 1 month ago. Lightheaded with ambulation and especially stairs.   * Receives 2L NS at infusion clinic Mondays, Wednesdays, and Fridays (Fri he has 1 L NS and 1L MVI)  - MIVF  - Continue PTA regimen including midodrine, droxidopa (wife will need to bring from home), Lodosyn, Sinemet  - Monitor closely  - Fall precautions      Hx PAT s/p ablation  Follows at [facility] heart [place].   Last ECHO: 8/2021: EF 65-70%  Recent coronary CTA with calcium score of 1  - F/u with cardiology as an outpatient     Parkinsons disease  Follows closely with neurology.  - Resume PTA regimen including Lodosyn, Sinemet, droxidopa     Chronic kidney disease, stage 3a  Baseline Cr 1.3-1.5.   - Avoid nephrotoxins - contrast, NSAIDs  - Renally dose medications as able/needed  - Monitor    Hx Behcets disease  Historically with lesions to eyes, mouth, penile region. Currently inactive, over 40 years ago required long term steroids but has not for many years.      Other pertinent history and chronic stable diagnoses: Appropriate prior to admission medications will be resumed.   Vitamin B12 deficiency  Benign essential tremor  Migraines  Prostate cancer     Asymptomatic COVID-19 admission screening PCR:  Negative at admission  - COVID-19 vaccination status: Fully vaccinated and boosted with Pfizer.  - Influenza vaccination status: 11/2021      Diet: Advance Diet as Tolerated: Regular Diet Adult     DVT Prophylaxis: Defer to primary service (PCDs and ASA   mg/d for 6 weeks)   Coker Catheter: PRESENT, indication: Retention  Code Status: No CPR- Do NOT Intubate     Disposition Plan    Per Ortho.       Entered: Misty Hines MD 03/26/2022, 9:09 AM        The patient's care was discussed with the Bedside Nurse and Patient.    Misty Hines MD  [facility]  Securely message with the Vocera Web Console (learn more here)  Text page via [employer] Paging/Directory      Interval History   Doing fine, pain controlled  - denies chest pain, no SOB  - no N/V, no abd pain; last BM on Tuesday; states he has some chronic constipation related to Parkinson's disease  - asks when he can do home; discussed about PT recommendation for TCU; states that today he feels better than yesterday and does not want to go to TCU.       -Data reviewed today: I reviewed all new labs and imaging results over the last 24 hours. I personally reviewed no images or EKG's today.    Physical Exam   BP (!) 88/58 (BP Location: Right arm)   Pulse 86   Temp 97.7  F (36.5  C) (Oral)   Resp 18   Ht 1.829 m (6')   Wt 65.8 kg (145 lb)   SpO2 97%   BMI 19.67 kg/m      Vital Signs with Ranges  Temp:  [97.7  F (36.5  C)-98.4  F (36.9  C)] 97.7  F (36.5  C)  Pulse:  [] 86  Resp:  [16-18] 18  BP: ()/(57-79) 88/58  SpO2:  [93 %-97 %] 97 %  I/O last 3 completed shifts:  In: -   Out: 975 [Urine:975]      Constitutional: Patient was awake, alert, cooperative, NAD  ENT: Normocephalic, without obvious abnormality, atraumatic, oral pharynx with moist mucus membranes, tonsils without erythema or exudates.  Neck: Supple, symmetrical, trachea midline, no adenopathy.  Pulmonary: bilateral air entry, no increased work of breathing, no crackles or wheezing.  Cardiovascular: Regular rate and rhythm, normal S1 and S2, no S3 or S4, and no murmur noted.  GI: Hypoactive bowel sounds, soft, non-distended, non-tender.  Skin/Integumen: Visualized skin appeared clear.  Neuro: resting tremor  b/l LE noted; CN II-XII grossly intact.  Psych:  Alert and oriented x 3. Normal affect.  Extremities: No lower extremity edema noted, and calves are non-TTP bilaterally. PCD's in place bilaterally.    : Coker catheter in place with urine present in the bag.        Medications     lactated ringers Stopped (03/25/22 2130)       acetaminophen  975 mg Oral Q8H     aspirin  325 mg Oral Daily     carbidopa  25 mg Oral TID     carbidopa-levodopa  1 tablet Oral TID     carbidopa-levodopa  1 half-tab Oral TID     citalopram  20 mg Oral QAM     midodrine  15 mg Oral TID     polyethylene glycol  17 g Oral Daily     senna-docusate  1 tablet Oral BID     sodium chloride (PF)  3 mL Intracatheter Q8H     vitamin D3  50 mcg Oral Daily       Data   Recent Labs   Lab 03/25/22  1202 03/25/22  0732 03/25/22  0544 03/24/22  1140   WBC 4.2  --   --   --    HGB 7.8* 7.9*  --  9.8*     --   --   --      --   --   --      --   --   --    POTASSIUM 4.1  --   --   --    CHLORIDE 101  --   --   --    CO2 31  --   --   --    BUN 31*  --   --   --    CR 1.51*  --   --   --    ANIONGAP 3  --   --   --    SAMUEL 8.9  --   --   --    *  --  100*  --        No results found for this or any previous visit (from the past 24 hour(s)).

## 2022-03-27 NOTE — PROGRESS NOTES
MD Notification    Notified Person: MD    Notified Person Name: Ranulfo Zuniga    Notification Date/Time: 3/27/22, 0157    Notification Interaction: Paged    Purpose of Notification: Requested urojet for straight-cath    Orders Received: yes    Comments:

## 2022-03-27 NOTE — PROGRESS NOTES
Care Management Follow Up    Length of Stay (days): 3    Expected Discharge Date: 03/28/2022     Concerns to be Addressed:    Discharge planning   Patient plan of care discussed at interdisciplinary rounds: Yes    Anticipated Discharge Disposition:  TCU placement     Anticipated Discharge Services:  therapy  Anticipated Discharge DME:  N/A    Patient/family educated on Medicare website which has current facility and service quality ratings:  N/A  Education Provided on the Discharge Plan:  yes  Patient/Family in Agreement with the Plan:  yes    Referrals Placed by CM/SW:  TCU referrals  Private pay costs discussed: private room/amenity fees    Additional Information:  Calls placed and messages left for Jay Arreola to follow up on the referrals from yesterday.  Awaiting return calls.      Will continue to follow.      ADRIANA Crump, [facility]    [phone]  [facility]

## 2022-03-27 NOTE — PROGRESS NOTES
Vitally stable, monitoring for soft Bps. AxOx4, up with sera-steady 2 assist (however was not up, slept [id number]). Was due to void, only able to void 50 mL with >477 mL bladder scan, straight cathed for approximately 475 with some spill, urethral lidocaine for comfort. Pain managed with robaxin, scheduled tylenol, oxycodone.

## 2022-03-27 NOTE — PROGRESS NOTES
[facility]  Orthopaedics/Foot and Ankle Surgery  Daily Post-Op Note    03/27/2022          Assessment and Plan:    Assessment:   Post-operative day #3  Procedure(s):  removal of hardware right femoral neck to right total hip arthroplasty ; posterior approach (Right)     Doing well.  No immediate surgical complications identified.  No excessive bleeding  Pain well-controlled.      Plan:    Continue aspirin 325 for DVT prophylaxis.     Mobilize with PT/OT WBAT with PL hip precautions.     Continue current pain regimen.     Disposition   Anticipate d/c to TCU when bed available and medically stable              Interval History:   Doing well.  Continues to improve.  Pain is well-controlled.  No fevers. Patient reports still having urine retention that has required straight cath. No events overnight. The patient is in agreement with going to a TCU.               Physical Exam:   Blood pressure (!) 89/60, pulse 87, temperature 98.2  F (36.8  C), temperature source Oral, resp. rate 16, height 1.829 m (6'), weight 65.8 kg (145 lb), SpO2 95 %.  I/O last 3 completed shifts:  In: 3 [I.V.:3]  Out: 525 [Urine:525]    Right hip dressing is clean, dry, and intact. Minimal erythema of the surrounding skin.  Bilateral calves are soft, non-tender.  right lower extremity is NVI.           Data:   All laboratory data related to this surgery reviewed  Recent Labs   Lab Test 03/26/22  0844 03/25/22  1202 03/25/22  0732 03/24/22  1140 02/23/22  0806   HGB 7.9* 7.8* 7.9* 9.8* 10.0*     Recent Labs   Lab Test 02/21/22  2149   INR 1.04      Recent Labs   Lab Test 03/26/22  0844 03/25/22  1202   WBC  --  4.2   PLT  --  180   POTASSIUM 3.9 4.1   CR 1.38* 1.51*     Avtar Feliz PA-C

## 2022-03-27 NOTE — PLAN OF CARE
Goal Outcome Evaluation:    Plan of Care Reviewed With: patient     POD#2 Right GILDARDO  A&O x4 w/ some confusion upon awakening.   CMS Intact. Tremors bilat hands and Right foot d/t hx Parkinson's  VSS on RA.   Up with Ax2 w/ Sera Stedy. Up to chair x1 during shift.  Wedge in place for hip precautions  Voiding in urinal small amounts. Bladder scanned 330mL @2100.   Pain controlled with Oxycodone, Robaxin, Tylenol.     Planned discharge 03/27/2022 to TCU pending placement.  Continue to monitor.

## 2022-03-27 NOTE — PROGRESS NOTES
[facility]    Hospitalist Progress Note    Assessment & Plan   Meir Richardson is a 71 year old adult who was admitted on 3/24/2022.     Past medical history significant for Behcets disease, parkinson's disease, paroxysmal atrial tachycardia s/p ablation, CKD, orthostatic hypotension with recurrent syncope and recent incomplete right femoral neck fracture (nontraumatic, pathologic/insufficiency fracture) s/p percutaneous pin of right hip (2/22/22) who was admitted by Ortho 3/24/22 for failed fixation and underwent conversion to right total hip arthroplasty with Dr. Basilio. Hospitalist service consulted for medical co-management.      Incomplete right femoral neck fracture, nontraumatic, likely pathologic/insufficiency fracture s/p percutaneous pin of right hip (2/22/22) with failed fixation s/p conversion to right total hip arthroplasty (3/24/22): Surgery per Dr. Basilio. General anesthesia used.   * From most recent admission: Hx Behcets disease and required steroids >40 years ago in the 1970s. He reports recently had a taper of steroids for prolonged headache. He was seen in [facility] 2/20 for acute onset hip pain/groin pain which presented abruptly with standing. No falls or trauma. Suspect etiology is insufficiency fracture. Of note, he had a whole body scan 1 year ago for history of prostate cancer and showed no metastases.   -- Defer routine post-operative cares, IVF, DVT prophylaxis and pain control to primary service    - WBAT with Posterior hip precautions   - Ancef x 24 hours   - DVT prophylaxis SCDs with ASA EC 325mg PO qday x 6 weeks    - Keep dressing c/d/i x 2 weeks, OK to shower   - Hip abduction pillow   - Follow biopsy results   - Follow Up: 2 week wound check with AP pelvis and cross table lateral hip. 8 weeks with AP pelvis and cross table lateral hip  -- Encourage incentive spirometer  -- Bowel regimen in place while on narcotics   -- PT/OT- rec TCU   -- SW  consult     Acute on chronic anemia  -- Hn around 10 in 2/2022  Suspected multifactorial (surgical blood loss and dilutional effect from IV fluids).    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 On midodrine. Follows with PCP and cardiology closely as well as neurology. Presyncope/syncope last 1 month ago. Lightheaded with ambulation and especially stairs.   * Receives 2L NS at infusion clinic Mondays, Wednesdays, and Fridays (Fri he has 1 L NS and 1L MVI)  - MIVF  - Continue PTA regimen including midodrine, droxidopa (wife will need to bring from home), Lodosyn, Sinemet  - Monitor closely  - Fall precautions      Hx PAT s/p ablation  Follows at [facility] heart [place].   Last ECHO: 8/2021: EF 65-70%  Recent coronary CTA with calcium score of 1  - F/u with cardiology as an outpatient     Parkinsons disease  Follows closely with neurology.  - Resume PTA regimen including Lodosyn, Sinemet, droxidopa     Chronic kidney disease, stage 3a  Baseline Cr 1.3-1.5.   - Avoid nephrotoxins - contrast, NSAIDs  - Renally dose medications as able/needed  - Monitor     Urinary retention  - Coker cath was removed yesterday but needed to be straight cath twice  - leave the Coker in  - check UA/UC  - cannot start Flomax or other alpha agonist receptor blockers because of orthostatic hypotension   - I anticipate he will be discharged to TCU with Coker and voiding trial when more ambulatory    Hyponatremia  - Na 135--132  - BMP in am    Hx Behcets disease  Historically with lesions to eyes, mouth, penile region. Currently inactive, over 40 years ago required long term steroids but has not for many years.      Other pertinent history and chronic stable diagnoses: Appropriate prior to admission medications will be resumed.   Vitamin B12  deficiency  Benign essential tremor  Migraines  Prostate cancer     Asymptomatic COVID-19 admission screening PCR:  Negative at admission  - COVID-19 vaccination status: Fully vaccinated and boosted with Pfizer.  - Influenza vaccination status: 11/2021      Diet: Advance Diet as Tolerated: Regular Diet Adult  Diet     DVT Prophylaxis: Defer to primary service (PCDs and ASA  mg/d for 6 weeks)   Coker Catheter: Not present  Code Status: No CPR- Do NOT Intubate     Disposition Plan    Per Ortho, needs TCU.       Entered: Misty Hines MD 03/27/2022, 2:46 PM        The patient's care was discussed with the Bedside Nurse and Patient.    Misty Hines MD  [facility]  Securely message with the Vocera Web Console (learn more here)  Text page via [employer] Paging/Directory      Interval History    Coker removed yesterday but needed straight cathX2 for urinary retention  - otherwise- doing fine, pain controlled  - denies chest pain, no SOB  - no N/V, no abd pain.    -Data reviewed today: I reviewed all new labs and imaging results over the last 24 hours. I personally reviewed no images or EKG's today.    Physical Exam   BP (!) 89/60 (BP Location: Right arm)   Pulse 87   Temp 98.2  F (36.8  C) (Oral)   Resp 16   Ht 1.829 m (6')   Wt 65.8 kg (145 lb)   SpO2 95%   BMI 19.67 kg/m      Vital Signs with Ranges  Temp:  [98.1  F (36.7  C)-98.5  F (36.9  C)] 98.2  F (36.8  C)  Pulse:  [85-91] 87  Resp:  [14-18] 16  BP: (83-96)/(57-62) 89/60  SpO2:  [95 %-96 %] 95 %  I/O last 3 completed shifts:  In: 3 [I.V.:3]  Out: 525 [Urine:525]      Constitutional: Patient was awake, alert, cooperative, NAD  ENT: Normocephalic, without obvious abnormality, atraumatic, oral pharynx with moist mucus membranes, tonsils without erythema or exudates.  Neck: Supple, symmetrical, trachea midline, no adenopathy.  Pulmonary: bilateral air entry, no increased work of breathing, no crackles or  wheezing.  Cardiovascular: Regular rate and rhythm, normal S1 and S2, no S3 or S4, and no murmur noted.  GI: Hypoactive bowel sounds, soft, non-distended, non-tender.  Skin/Integumen: Visualized skin appeared clear.  Neuro: resting tremor b/l LE noted; CN II-XII grossly intact.  Psych:  Alert and oriented x 3. Normal affect.  Extremities: No lower extremity edema noted, and calves are non-TTP bilaterally. PCD's in place bilaterally.    : Coker catheter in place.      Medications     lactated ringers Stopped (03/25/22 2130)       aspirin  325 mg Oral Daily     carbidopa  25 mg Oral TID     carbidopa-levodopa  1 tablet Oral TID     carbidopa-levodopa  1 half-tab Oral TID     citalopram  20 mg Oral QAM     midodrine  15 mg Oral TID     polyethylene glycol  17 g Oral Daily     senna-docusate  1 tablet Oral BID     sodium chloride (PF)  3 mL Intracatheter Q8H     vitamin D3  50 mcg Oral Daily       Data   Recent Labs   Lab 03/26/22  0844 03/25/22  1202 03/25/22  0732 03/25/22  0544   WBC  --  4.2  --   --    HGB 7.9* 7.8* 7.9*  --    MCV  --  100  --   --    PLT  --  180  --   --    * 135  --   --    POTASSIUM 3.9 4.1  --   --    CHLORIDE 101 101  --   --    CO2 27 31  --   --    BUN 34* 31*  --   --    CR 1.38* 1.51*  --   --    ANIONGAP 4 3  --   --    SAMUEL 9.0 8.9  --   --    GLC 96 104*  --  100*       No results found for this or any previous visit (from the past 24 hour(s)).

## 2022-03-27 NOTE — PROGRESS NOTES
MD Notification    Notified Person: MD    Notified Person Name: Ranulfo Zuniga    Notification Date/Time: 3/27/2022 1203    Notification Interaction: Paged    Purpose of Notification:    Orders Received:    Patient second night difficulty sleeping due to irritability (hx of dementia) anything we can give for sleep/irritability other than melatonin?     Comments:

## 2022-03-27 NOTE — PROGRESS NOTES
Care Management Follow Up    Length of Stay (days): 3    Expected Discharge Date: 03/28/2022     Concerns to be Addressed:   Discharge planning   Patient plan of care discussed at interdisciplinary rounds: Yes    Anticipated Discharge Disposition:  TCU placement     Anticipated Discharge Services:  therapy  Anticipated Discharge DME:      Patient/family educated on Medicare website which has current facility and service quality ratings:  N/A  Education Provided on the Discharge Plan:  yes  Patient/Family in Agreement with the Plan:  yes    Referrals Placed by CM/SW:  TCU referrals  Private pay costs discussed: private room/amenity fees    Additional Information:  Calls placed x 3 and messages left at [facility] to follow up on the referral.  Awaiting a return call.  Call placed to update patient's wife who states that [facility] is now their first choice.  Also updated her as to transportation options and explained that wheelchair transport is private pay.      Will continue to follow.      ADRIANA Crump, [facility]    [phone]  [facility]

## 2022-03-27 NOTE — PLAN OF CARE
Goal Outcome Evaluation:    Plan of Care Reviewed With: patient, spouse      Pt. A&o, vss, up with 1-2 assist and walker, taking tylenol for pain, dressing CDI, unable to void and melgar placed, pt. Ambulated with nursing staff 190 ft this evening, plan: discharge to TCU when bed available, Will continue to monitor.

## 2022-03-28 NOTE — PROGRESS NOTES
[facility]    Medicine Progress Note - Hospitalist Service    Date of Admission:  3/24/2022    Assessment & Plan            Active Problems:  1)   Closed displaced fracture of right femoral neck (H)    Assessment: Patient is s/p right total hip arthroplasty on 3/24/2022.  He had a failed percutaneous pinning of the right hip on 2/22 which was converted to right total hip arthroplasty on 3/24.  Has tolerated the procedure well.  Is currently postop.   - Management per Ortho.  Weightbearing as tolerated with hip precautions.    -Currently on DVT prophylaxis with aspirin 325 mg daily for 6 weeks.  -Follow Up: 2 week wound check with AP pelvis and cross table lateral hip. 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  He did have a recent burst of steroid treatment for migraine.    2.  Acute on chronic anemia-currently hemoglobin stable at 7.9.  Most likely postoperative blood loss.  Could be some dilutional effect from IV fluids as well.  Repeat hemoglobin in the morning.    3.  Orthostatic hypotension-secondary to dysautonomia from Parkinson's.  Patient is on Midodrin regularly.  Patient also gets 3 times a week IV fluid infusion set on outside facility.  He is currently significantly orthostatic.  His lying blood pressure was 131/81 and standing blood pressure was 77/55.  Did order a one-time bolus of 500 cc IV fluid.  He has had history of syncope and presyncope with lightheadedness on ambulation.  Continue on Midodrin 15 mg 3 times a day and droxidopa, lodosyn 25mg 3 times a day and Sinemet.  Fall precautions.    4.  History of paroxysmal atrial tachycardia s/p [id number] with cardiology as an outpatient.  Follows  at [facility].    5.  Chronic kidney disease stage III-creatinine stable between 1.3-1.5.  Avoid contrast and NSAID.  Monitor.    #6 persistent urinary retention-failed catheter removal trial yesterday.  Currently has indwelling Coker.  A urinalysis yesterday shows moderate amount of blood and leukocyte esterase but patient has no symptoms of urinary tract infection.  For now we will continue to monitor.  Might have to be discharged with Coker and a voiding trail as an outpatient once he is more stable.    7.  Hyponatremia resolved.  Sodium of 137 this morning.    8.  History of Behcet's disease-currently inactive.  Not on any treatment.       Diet: Advance Diet as Tolerated: Regular Diet Adult  Diet    DVT Prophylaxis: Defer to primary service  Coker Catheter: PRESENT, indication: Retention, Retention  Central Lines: None  Cardiac Monitoring: None  Code Status: No CPR- Do NOT Intubate      Disposition Plan   Expected Discharge: 03/28/2022     Anticipated discharge location: home    Delays:     Placement - TCU            The patient's care was discussed with the Bedside Nurse.    Dara Odom MD  Hospitalist Service  [facility]  Securely message with the Vocera Web Console (learn more here)  Text page via "[employer] Paging/Directory         Clinically Significant Risk Factors Present on Admission                    ______________________________________________________________________    Interval History   Patient awake and alert.  No acute events overnight.  Denies any chest pain or weakness.  Does tend to get slightly lightheaded when he is up.  Nutrioso that he pushed  himself too much yesterday he went for a walk.  Denied any pain over the suture site.  Denies any abdominal pain, nausea or vomiting.    Data reviewed today: I reviewed all medications, new labs and imaging results over the last 24 hours. I personally reviewed no images or EKG's today.    Physical Exam   Vital Signs:  Temp: 98  F (36.7  C) Temp src: Oral BP: 123/81 Pulse: 89   Resp: 16 SpO2: 97 % O2 Device: None (Room air)    Weight: 145 lbs 0 oz  Physical Exam  Constitutional:       General: Meir Richardson is not in acute distress.     Appearance: Meir Richardson is well-developed.   HENT:      Right Ear: Tympanic membrane and external ear normal.      Left Ear: Tympanic membrane and external ear normal.      Nose: Nose normal.      Mouth/Throat:      Mouth: No oral lesions.      Pharynx: No oropharyngeal exudate.   Eyes:      General:         Right eye: No discharge.         Left eye: No discharge.      Conjunctiva/sclera: Conjunctivae normal.      Pupils: Pupils are equal, round, and reactive to light.   Neck:      Thyroid: No thyromegaly.      Trachea: No tracheal deviation.   Cardiovascular:      Rate and Rhythm: Normal rate and regular rhythm.      Pulses: Normal pulses.      Heart sounds: Normal heart sounds, S1 normal and S2 normal. No murmur heard.    No S3 or S4 sounds.   Pulmonary:      Effort: Pulmonary effort is normal. No respiratory distress.      Breath sounds: Normal breath sounds. No wheezing or rales.   Abdominal:      General: Bowel sounds are normal.      Palpations: Abdomen is soft. There is no mass.      Tenderness: There is no abdominal tenderness.   Musculoskeletal:         General: No deformity. Normal range of motion.      Cervical back: Neck supple.      Comments: Has a surgical site incision on the over the right hip with dressing in place.  Appears stable.   Lymphadenopathy:      Cervical: No cervical adenopathy.   Skin:     General: Skin is warm and dry.      Findings: No lesion or rash.   Neurological:      Mental Status: Meir Richardson is alert and oriented to person, place, and time.      Motor: No abnormal muscle tone.      Deep Tendon Reflexes: Reflexes are normal and symmetric.      Comments: Has baseline resting tremor in both his arms and legs.   Psychiatric:         Speech: Speech normal.          Thought Content: Thought content normal.         Judgment: Judgment normal.           Data   Recent Labs   Lab 03/28/22  0632 03/26/22  0844 03/25/22  1202   WBC  --   --  4.2   HGB 7.9* 7.9* 7.8*   MCV  --   --  100   PLT  --   --  180    132* 135   POTASSIUM 3.8 3.9 4.1   CHLORIDE 103 101 101   CO2 31 27 31   BUN 30 34* 31*   CR 1.37* 1.38* 1.51*   ANIONGAP 3 4 3   SAMUEL 9.3 9.0 8.9   GLC 96 96 104*

## 2022-03-28 NOTE — PLAN OF CARE
Goal Outcome Evaluation:Patient vital signs are at baseline: Yes  Patient able to ambulate as they were prior to admission or with assist devices provided by therapies during their stay:  Yes  Patient MUST void prior to discharge:  No,melgar in for retention.  Patient able to tolerate oral intake:  Yes  Pain has adequate pain control using Oral analgesics:  Yes  Does patient have an identified :  Yes  Has goal D/C date and time been discussed with patient:  Yes   A/OX4, baseline BLE tremors. Up with 1 assist/walker. Denies pain. Dreg CDI. Pt is dischargign to TCU at 1415.Denies pain.Folay in place for retention.

## 2022-03-28 NOTE — PROGRESS NOTES
Care Management Follow Up    Length of Stay (days): 4    Expected Discharge Date: 03/28/2022     Concerns to be Addressed:       Patient plan of care discussed at interdisciplinary rounds: Yes    Anticipated Discharge Disposition:       Anticipated Discharge Services:    Anticipated Discharge DME:      Patient/family educated on Medicare website which has current facility and service quality ratings:    Education Provided on the Discharge Plan:    Patient/Family in Agreement with the Plan:      Referrals Placed by CM/SW:    Private pay costs discussed: Not applicable    Additional Information:  [facility] and buffy left messages asking if there are any tcu beds available for patient.    [facility] in [facility] private room. Called Lola (spouse) and let her know. She said she would like writer to go there.     Will continue to follow.    MELISA Maharaj

## 2022-03-28 NOTE — PROVIDER NOTIFICATION
Spouse spoke to writer this morning for update on discharge planning. She also wanted to make sure MD knew that her  gets 2 liters of Saline Transfusions through [facility] every Monday/Wednesday/Friday. Writer will let the Hospitalist know. He has been getting this for his hypotension/dehydration.

## 2022-03-28 NOTE — PROGRESS NOTES
Care Management Discharge Note    Discharge Date: 03/28/2022       Discharge Disposition:      Discharge Services:      Discharge DME:      Discharge Transportation: family or friend will provide    Private pay costs discussed:Private room    PAS Confirmation Code:  [id number]  Patient/family educated on Medicare website which has current facility and service quality ratings:      Education Provided on the Discharge Plan:  yes  Persons Notified of Discharge Plans:patient  Patient/Family in Agreement with the Plan:  yes    Handoff Referral Completed: Yes    Additional Information:  Bed available at [facility] (private room-spouse is aware of cost). Yifan from [facility] is picking patient up at 1415. Completed PAS, put in chart and sent to facility.    PAS-RR    D: Per DHS regulation, SW completed and submitted PAS-RR to [place] [employer]ging Direct Connect via the Senior LinkAge Line.  PAS-RR confirmation # is : [id number]    I: SW spoke with patient and they are aware a PAS-RR has been submitted.  SW reviewed with patient that they may be contacted for a follow up appointment within 10 days of hospital discharge if their SNF stay is < 30 days.  Contact information for Senior [employer] Line was also provided.    A: Patient verbalized understanding.    P: Further questions may be directed to Senior LinkAge Line at #[phone], option #4 for PAS-RR staff.      MELISA Maharaj

## 2022-03-28 NOTE — PLAN OF CARE
Physical Therapy Discharge Summary    Reason for therapy discharge:    Discharged to transitional care facility.    Progress towards therapy goal(s). See goals on Care Plan in [facility] electronic health record for goal details.  Goals partially met.  Barriers to achieving goals:   discharge from facility.    Therapy recommendation(s):    Continued therapy is recommended.  Rationale/Recommendations:  Skilled Pt to increase strength and functional mobility.

## 2022-03-28 NOTE — PLAN OF CARE
Goal Outcome Evaluation:    A&O x4. VS going back up and stable on RA. Denies dizziness w/ exertion. IV saline lock. Up w/ A1-2 GB/walker. Tolerating meds w/ applesauce. Denies pain for this shift. Coker patent & voiding adequately. Encouraged PO intake. CMS intact. Dressing CDI. TCU pending. Cont' to monitor.

## 2022-03-29 PROBLEM — I95.1 ORTHOSTATIC HYPOTENSION: Status: ACTIVE | Noted: 2019-08-22

## 2022-03-29 PROBLEM — M24.341: Status: ACTIVE | Noted: 2020-09-24

## 2022-03-29 PROBLEM — C61 MALIGNANT TUMOR OF PROSTATE (H): Status: ACTIVE | Noted: 2021-03-27

## 2022-03-29 PROBLEM — G43.019 INTRACTABLE COMMON MIGRAINE: Status: ACTIVE | Noted: 2019-08-22

## 2022-03-29 PROBLEM — I49.3 PVC (PREMATURE VENTRICULAR CONTRACTION): Status: ACTIVE | Noted: 2022-01-01

## 2022-03-29 PROBLEM — G43.909 MIGRAINE: Status: ACTIVE | Noted: 2022-01-01

## 2022-03-29 PROBLEM — I47.19 PAT (PAROXYSMAL ATRIAL TACHYCARDIA) (H): Status: ACTIVE | Noted: 2018-01-25

## 2022-03-29 PROBLEM — D64.9 ANEMIA: Status: ACTIVE | Noted: 2022-01-01

## 2022-03-29 PROBLEM — G20.A1 PARKINSON'S DISEASE (H): Status: ACTIVE | Noted: 2017-05-09

## 2022-03-29 PROBLEM — R53.83 FATIGUE: Status: ACTIVE | Noted: 2022-01-01

## 2022-03-29 PROBLEM — R94.31 NONSPECIFIC ABNORMAL ELECTROCARDIOGRAM (ECG) (EKG): Status: ACTIVE | Noted: 2022-01-01

## 2022-03-29 PROBLEM — M54.2 CERVICALGIA: Status: ACTIVE | Noted: 2021-09-03

## 2022-03-29 PROBLEM — R63.4 ABNORMAL WEIGHT LOSS: Status: ACTIVE | Noted: 2019-08-22

## 2022-03-29 PROBLEM — N20.0 NEPHROLITHIASIS: Status: ACTIVE | Noted: 2021-03-16

## 2022-03-29 PROBLEM — R00.2 PALPITATIONS: Status: ACTIVE | Noted: 2022-01-01

## 2022-03-29 PROBLEM — F41.9 ANXIETY: Status: ACTIVE | Noted: 2021-03-16

## 2022-03-29 PROBLEM — R06.09 DYSPNEA ON EXERTION: Status: ACTIVE | Noted: 2020-05-28

## 2022-03-29 PROBLEM — M24.349: Status: ACTIVE | Noted: 2019-07-12

## 2022-03-29 NOTE — PROGRESS NOTES
[facility] GERIATRICS    PRIMARY CARE PROVIDER AND CLINIC:  Fareed Webb MD, [facility] [address] [place] [address]  / KAYLA BAUER [id number]  Chief Complaint   Patient presents with     Hospital F/U      [facility] Medical Record Number:  [id number]  Place of Service where encounter took place:  [facility] (TCU) [[id number]]    Meir Richardson  is a 71 year old  (1950), admitted to the above facility from  [facility]. Hospital stay 3/24/22 through 3/28/22.  HPI:    71 year old male with PMH significant for Behcets disease, parkinson's disease, paroxysmal atrial tachycardia s/p ablation, CKD, orthostatic hypotension with recurrent syncope and recent incomplete right femoral neck fracture (nontraumatic, pathologic/insufficiency fracture) s/p percutaneous pin of right hip (2/22/22) who was admitted by Ortho 3/24/22 for failed fixation and underwent conversion to right total hip arthroplasty. WBAT, on ASA x 6 wks for DVT prophylaxis. Acute on chronic anemia with Hgb 7.9. On midodrine due to orthostatic hypotension. CKD3 Cr 1.3-1.5. Urinary retention, Coker in place. Hyponatremia resolved. To TCU for therapies.      Patient seen for initial TCU visit. Reports pain not well managed - discussed scheduling tylenol, scheduling low dose oxycodone at this time. No headaches. Occasionally dizzy when first up. No chest pain, dyspnea. Constipated - no stool x 5 days. Coker remains in place due to retention. Per home routine received IV fluids three times weekly due to orthostasis. BP range 107-127/73-81 daily. Sats 95% room air. Reviewed code status - DNR/DNI confirmed.     CODE STATUS/ADVANCE DIRECTIVES DISCUSSION:  Prior  DNR / DNI  ALLERGIES:   Allergies   Allergen Reactions     Erythromycin Angioedema and Hives     Has tolerated Azithromycin in the past     Flecainide Headache     Tremors - Reported after 2nd dose also mentioned concurrent migraine (has a hx of migraines prior to flecainide)      Dronedarone Anxiety and Dizziness      Also gets the shakes and has difficulty sleeping     Keflex [Cephalexin] Rash     Penicillins Rash     Sulfa Drugs Rash     Tetracycline Rash      PAST MEDICAL HISTORY:   Past Medical History:   Diagnosis Date     Behcet's syndrome (H)     diagnosed 1970's. Resolved     Benign essential tremor      Irregular heart beat     PVC's     Migraine      Parkinsons disease (H)      Paroxysmal atrial tachycardia (H)     s/p ablation     PONV (postoperative nausea and vomiting)      Scapular dyskinesis      Syncope      Vitamin B 12 deficiency       PAST SURGICAL HISTORY:   has a past surgical history that includes colonoscopy; Cardiac surgery; Open reduction internal fixation toe(s) (Right, 05/11/2017); Av Node Ablation (2018); Hand surgery (Right, 09/2020); hernia repair; Open reduction internal fixation hip nailing (Right, 2/22/2022); and Arthroplasty revision hip (Right, 3/24/2022).  FAMILY HISTORY: family history is not on file.  SOCIAL HISTORY:   reports that Meir Richardson has never smoked. Meir Richardson has never used smokeless tobacco. Meir Richardson reports that Meir Richardson does not drink alcohol and does not use drugs.  Patient's living condition: lives with spouse    Post Discharge Medication Reconciliation Status: discharge medications reconciled and changed, per note/orders  Current Outpatient Medications   Medication Sig     acetaminophen (TYLENOL) 500 MG tablet Take 1,000 mg by mouth 3 times daily     aspirin (ASA) 325 MG EC tablet Take 1 tablet (325 mg) by mouth daily     carbidopa (LODOSYN) 25 MG TABS tablet Take 25 mg by mouth 3 times daily     carbidopa-levodopa (SINEMET)  MG per tablet Take 1.5 tablets by mouth 3 times daily With food     carbidopa-levodopa (SINEMET)  MG tablet Take 1 tablet by mouth every evening as needed     citalopram (CELEXA) 20 MG tablet Take 20 mg by mouth every morning      cyclobenzaprine (FLEXERIL) 10 MG tablet Take  10 mg by mouth every morning      droxidopa (NORTHERA) 100 MG capsule Take 200 mg by mouth 3 times daily     ergotamine-caffeine (CAFERGOT) 1-100 MG tablet Take 2 tablets by mouth daily as needed for headaches or migraine Two tablets at onset of attack; then 1 tablet every 30 minutes as needed; maximum: 6 tablets per attack; do not exceed 10 tablets/week.     midodrine (PROAMATINE) 5 MG tablet Take 15 mg by mouth 3 times daily     oxyCODONE (ROXICODONE) 5 MG tablet Take 1 tablet (5 mg) by mouth 2 times daily And BID PRN     Probiotic Product (PROBIOTIC DAILY PO) Take 1 capsule by mouth every evening      senna-docusate (SENOKOT-S/PERICOLACE) 8.6-50 MG tablet Take 1 tablet by mouth 2 times daily     SUMAtriptan (IMITREX) 50 MG tablet Take 50 mg by mouth daily as needed for migraine      vitamin D3 (CHOLECALCIFEROL) 50 mcg (2000 units) tablet Take 1 tablet (50 mcg) by mouth daily     LEUPROLIDE ACETATE, 6 MONTH, SC Inject Subcutaneous every 6 months (Patient not taking: Reported on 3/29/2022)     order for DME ADJUSTABLE LIGHTWEIGHT WALKER (Patient not taking: Reported on 3/29/2022)     No current facility-administered medications for this visit.       ROS:  10 point ROS of systems including Constitutional, Eyes, Respiratory, Cardiovascular, Gastroenterology, Genitourinary, Integumentary, Musculoskeletal, Psychiatric were all negative except for pertinent positives noted in my HPI.    Vitals:  /73   Pulse 107   Temp 98.6  F (37  C)   Resp 18   SpO2 97%   Exam:  GENERAL APPEARANCE:  Alert, in no distress, pleasant, cooperative, oriented x 3  EYES:  EOM, lids, pupils and irises normal, sclera clear and conjunctiva normal, no discharge or mattering on lids or lashes noted  ENT:  Mouth normal, moist mucous membranes, nose normal without drainage or crusting, external ears without lesions, hearing acuity intact  NECK: supple, symmetrical, trachea midline  RESP:  respiratory effort normal, no chest wall  tenderness, no respiratory distress, Lung sounds clear, patient is on room air  CV:  Auscultation of heart done, rate and rhythm controlled and regular, no murmur, no rub or gallop. Edema none bilateral lower extremities.   ABDOMEN:  Hypoactive bowel sounds, soft, nontender, no palpable masses.  M/S:   Gait and station not assessed, no tenderness or swelling of the joints; able to move all extremities, digits normal  NEURO: cranial nerves 2-12 grossly intact, no facial asymmetry, no speech deficits and able to follow directions, moves all extremities symmetrically  PSYCH:  insight and judgement and memory appear at baseline, affect and mood normal     Lab/Diagnostic data:  Most Recent 3 CBC's:  Recent Labs   Lab Test 03/28/22  0632 03/26/22  0844 03/25/22  1202 02/23/22  0806 02/21/22  2149   WBC  --   --  4.2  --  3.1*   HGB 7.9* 7.9* 7.8*   < > 10.3*   MCV  --   --  100  --  100   PLT  --   --  180  --  193    < > = values in this interval not displayed.     Most Recent 3 BMP's:  Recent Labs   Lab Test 03/28/22  0632 03/26/22  0844 03/25/22  1202    132* 135   POTASSIUM 3.8 3.9 4.1   CHLORIDE 103 101 101   CO2 31 27 31   BUN 30 34* 31*   CR 1.37* 1.38* 1.51*   ANIONGAP 3 4 3   SAMUEL 9.3 9.0 8.9   GLC 96 96 104*       ASSESSMENT/PLAN:  Closed displaced fracture of right femoral neck (H)  Physical deconditioning   Acute on chronic, s/p surgery as above. Change Oxycodone to 5 mg PO BID and BID PRN, tylenol change to 1000 mg TID, asa 325 mg daily x 6 weeks for DVT prophylaxis, vit D 2000 units daily. Therapies as ordered - f/u with progress next visit. Ortho f/u as planned.     Acute on chronic anemia  Acute with surgery. Hgb 7.9 on 3/28. Recheck CBC 4/1.     Orthostatic hypotension  Chronic, managed with midodrine 15 mg TID, IV fluids three times weekly. Monitor vs, f/u with ranges next visit.     PAT (paroxysmal atrial tachycardia) (H)  Monitor HR, review range next visit.     Stage 3 chronic kidney disease,  unspecified whether stage 3a or 3b CKD (H)  Chronic, appears stable last check. Avoid nephrotoxins. BMP check 4/1. IV fluids as ordered below.     Urinary retention  Ongoing, melgar in place. Consider voiding trial once more mobile.     Hyponatremia  Resolved last check. BMP this week and f/u with results.     Behcet's disease (H)  Not an active problem currently, no treatment.     Parkinson's disease (H)  Chronic, continue PTA carbidopa 25 mg TID, sinemet  mg 1.5 mg TID and 1 tab HS PRN, Celexa 20 mg daily, flexeril 10 mg daily, droxidopa 200 mg TID. Monitor. Neurology f/u per home routine.     Slow transit constipation  Acute on chronic. Continue probiotic daily, senna s 1 tab BID. Dulcolax supp 10 mg NJ today. Staff to update provider if not effective.     Advanced directives, counseling/discussion  Reviewed and completed POLST - DNR/DNI desired.     Orders:  1. DNR/DNI  2. Dulcolax supp 10 mg NJ today diagnosis constipation  3. CBC, BMP on 4/1 diagnosis weakness  4. Change tylenol to 1000 mg TID diagnosis pain  5. Change oxycodone to 5 mg BID and BID PRN diagnosis pain  6. Place IV Saline Lock. Infuse  ml at 250 ml/hr three times per week MWF, then saline lock IV.     Total unit/floor time of 38 minutes spent consisted of the following: examination of patient, reviewing the record including pertinent labs and imaging. More than 50% of this time was spent in coordination of care with the patient, nursing staff, and other healthcare providers. This time was spent on discussing the care plan including labs, discharge/safe placement, and specialty follow up. Additional specific discussion included review and completion of POLST, management of pain, management of constipation, management of IV fluids for orthostasis, expected TCU course/routine/discharge planning and clarification of meds and orders with nursing staff for a total of over 20 min.     Electronically signed by:  MANUEL Velasco CNP

## 2022-03-29 NOTE — PROGRESS NOTES
[place] [facility]  INITIAL VISIT NOTE  March 30, 2022    PRIMARY CARE PROVIDER AND CLINIC:  Jon Fareed [address]  / [place] BAUER [address]    CHIEF COMPLAINT:  Hospital follow-up/Initial visit    HPI:    Meir Richardson is a 71 year old  (1950) adult who was seen at [place] [facility] on March 30, 2022 for an initial visit.     Medical history is notable for Parkinson's disease, orthostatic hypotension with recurrent syncope, CKD, chronic anemia, migraine, Behcet's syndrome, melanoma, prostate cancer, frequent PVCs, s/p ablation, depression, anxiety, and recent open reduction and internal fixation of right femoral neck fracture on February 22, 2022.    Summary of hospital course:  Patient was hospitalized at [facility] from March 24 through March 28, 2022 for right total hip arthroplasty due to failed right hip femoral neck fracture fixation.  Surgery was performed on March 24, 2022.  Postop hemoglobin dropped to 7.9 from initial 9.8.  Notably, UA was abnormal on March 27, however culture grew no organism.  TCU was recommended per therapies.    Patient is admitted to this facility for medical management, nursing care, and rehab.     Of note, history was obtained from patient, facility RN, and extensive review of the chart.    Today's visit:  Patient was seen in his room, while lying in bed.  He appears frail but in no acute distress.  Surgical pain is fairly controlled.  He has a Coker catheter, placed in the hospital after surgery.  He denies fever, chills, chest pain, palpitation, dyspnea, nausea, vomiting, abdominal pain, or urinary symptoms.  He reports that he had a bowel movement last night.  Occasionally he feels lightheaded upon standing.      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 Resolved     Benign essential tremor      Irregular heart beat     PVC's     Migraine      Parkinsons disease (H)      Paroxysmal atrial tachycardia (H)     s/p ablation     PONV (postoperative nausea and vomiting)      Scapular dyskinesis      Syncope      Vitamin B 12 deficiency        PAST SURGICAL HISTORY:   Past Surgical History:   Procedure Laterality Date     ARTHROPLASTY REVISION HIP Right 3/24/2022    Procedure: removal of hardware right femoral neck to right total hip arthroplasty ; posterior approach;  Surgeon: Lito Basilio MD;  Location:  OR     AV NODE ABLATION  2018    for atrial tachycardia     CARDIAC SURGERY      ablation     COLONOSCOPY       HAND SURGERY Right 2020     HERNIA REPAIR       OPEN REDUCTION INTERNAL FIXATION HIP NAILING Right 2022    Procedure: OPEN REDUCTION INTERNAL FIXATION, RIGHT HIP.;  Surgeon: Lito Basilio MD;  Location:  OR     OPEN REDUCTION INTERNAL FIXATION TOE(S) Right 2017    Procedure: OPEN REDUCTION INTERNAL FIXATION TOE(S);  RIGHT FIRST MTP FUSION, FOREFOOR RECONSTRUCTION;  Surgeon: Yfn Ulloa MD;  Location:  OR       FAMILY HISTORY:   Father had lymphoma, colon cancer, and RA who  at age 92 after a fall and brain bleed.  Mother  of brain hemorrhage at age 70.  One brother  of ALS at age 63.    SOCIAL HISTORY:  Social History     Tobacco Use     Smoking status: Never Smoker     Smokeless tobacco: Never Used   Substance Use Topics     Alcohol use: No       MEDICATIONS:  Current  Outpatient Medications   Medication Sig Dispense Refill     acetaminophen (TYLENOL) 500 MG tablet Take 1,000 mg by mouth 3 times daily       aspirin (ASA) 325 MG EC tablet Take 1 tablet (325 mg) by mouth daily 45 tablet 0     carbidopa (LODOSYN) 25 MG TABS tablet Take 25 mg by mouth 3 times daily       carbidopa-levodopa (SINEMET)  MG per tablet Take 1.5 tablets by mouth 3 times daily With food       carbidopa-levodopa (SINEMET)  MG tablet Take 1 tablet by mouth every evening as needed       citalopram (CELEXA) 20 MG tablet Take 20 mg by mouth every morning        cyclobenzaprine (FLEXERIL) 10 MG tablet Take 10 mg by mouth every morning        droxidopa (NORTHERA) 100 MG capsule Take 200 mg by mouth 3 times daily       ergotamine-caffeine (CAFERGOT) 1-100 MG tablet Take 2 tablets by mouth daily as needed for headaches or migraine Two tablets at onset of attack; then 1 tablet every 30 minutes as needed; maximum: 6 tablets per attack; do not exceed 10 tablets/week.       LEUPROLIDE ACETATE, 6 MONTH, SC Inject Subcutaneous every 6 months (Patient not taking: Reported on 3/29/2022)       midodrine (PROAMATINE) 5 MG tablet Take 15 mg by mouth 3 times daily       order for DME ADJUSTABLE LIGHTWEIGHT WALKER (Patient not taking: Reported on 3/29/2022) 1 Units 0     oxyCODONE (ROXICODONE) 5 MG tablet Take 1 tablet (5 mg) by mouth 2 times daily And BID PRN 30 tablet 0     Probiotic Product (PROBIOTIC DAILY PO) Take 1 capsule by mouth every evening        senna-docusate (SENOKOT-S/PERICOLACE) 8.6-50 MG tablet Take 1 tablet by mouth 2 times daily 40 tablet 0     SUMAtriptan (IMITREX) 50 MG tablet Take 50 mg by mouth daily as needed for migraine        vitamin D3 (CHOLECALCIFEROL) 50 mcg (2000 units) tablet Take 1 tablet (50 mcg) by mouth daily 30 tablet 0       Post Discharge Medication Reconciliation Status: discharge medications reconciled, continue medications without change.        ALLERGIES:  Allergies    Allergen Reactions     Erythromycin Angioedema and Hives     Has tolerated Azithromycin in the past     Flecainide Headache     Tremors - Reported after 2nd dose also mentioned concurrent migraine (has a hx of migraines prior to flecainide)     Dronedarone Anxiety and Dizziness      Also gets the shakes and has difficulty sleeping     Keflex [Cephalexin] Rash     Penicillins Rash     Sulfa Drugs Rash     Tetracycline Rash       ROS:  10 point ROS were negative other than the symptoms noted above in the HPI.    PHYSICAL EXAM:  Vital signs were reviewed in the chart.  Vital Signs: /87   Pulse 91   Temp 97.8  F (36.6  C)   Resp 18   Ht 1.829 m (6')   Wt 68.2 kg (150 lb 6.4 oz)   SpO2 98%   BMI 20.40 kg/m    General: Frail appearing but comfortable and in no acute distress  HEENT: Conjunctival pallor  Cardiovascular: Normal S1, S2, RRR  Respiratory: Lungs clear to auscultation bilaterally  GI: Abdomen soft, non-tender, non-distended, +BS  Extremities: No LE edema  Neuro: CX II-XII grossly intact; ROM in all four extremities grossly intact; right hand tremor noted  Psych: Alert and oriented x3; normal affect  Skin: Right hip surgical wound is dressed and clean.    LABORATORY/IMAGING DATA:  All relevant labs and imaging data were reviewed personally today.      Most Recent 3 CBC's:Recent Labs   Lab Test 03/28/22  0632 03/26/22  0844 03/25/22  1202 02/23/22  0806 02/21/22  2149   WBC  --   --  4.2  --  3.1*   HGB 7.9* 7.9* 7.8*   < > 10.3*   MCV  --   --  100  --  100   PLT  --   --  180  --  193    < > = values in this interval not displayed.     Most Recent 3 BMP's:Recent Labs   Lab Test 03/28/22  0632 03/26/22  0844 03/25/22  1202    132* 135   POTASSIUM 3.8 3.9 4.1   CHLORIDE 103 101 101   CO2 31 27 31   BUN 30 34* 31*   CR 1.37* 1.38* 1.51*   ANIONGAP 3 4 3   SAMUEL 9.3 9.0 8.9   GLC 96 96 104*         ASSESSMENT/PLAN:  Failed right hip femoral neck fracture fixation, s/p conversion to right total  hip arthroplasty on March 24, 2022,  Physical deconditioning.  Patient is hemodynamically stable.  Surgical pain is controlled.  Plan:  Fall precautions  WBAT with posterior hip precautions  Continue pain management with scheduled acetaminophen 1000 mg p.o. 3 times daily and both scheduled and as needed oxycodone as ordered  Continue DVT prophylaxis with aspirin 325 mg p.o. for total of 6 weeks  Mobilize with PT/OT  Follow-up with Ortho as recommended    Acute blood loss anemia,  Chronic anemia.  Baseline hemoglobin in the range of 10-12.  Drop in hemoglobin due to blood loss of orthopedic surgeries.  Last hemoglobin was stable at 7.9 on March 28.  Plan:  Monitor hemoglobin periodically  Next CBC is scheduled for April 1    History of frequent PVCs, s/p ablation of RVOT PVC's in June 2016.  Plan:  Monitor heart rate    Parkinson's disease.  Stable.  Plan:  Fall precautions  Staff to assist with mobility  Continue carbidopa-levodopa  mg, 1.5 mg X p.o. 3 times daily and 1 tablet p.o. every evening as needed  Continue carbidopa 25 mg p.o. 3 times daily    Orthostatic hypotension with history of recurrent syncope.  Occasional lightheadedness upon standing.  Plan:  Continue midodrine 15 mg p.o. 3 times daily and droxidopa 200 mg p.o. 3 times daily  Continue with IV fluids 3 times a week  Standard orthostatic hypotension precautions    CKD stage IIIa.  Baseline creatinine around 1.3-1.5.  Last creatinine was stable at 1.37 on March 28.  Plan:  Avoid NSAIDs and nephrotoxins  Monitor renal function periodically  Next BMP is scheduled for April 1    Urinary retention.  Coker in place.  Plan  Continue Coker catheter  Voiding trial once patient is more ambulatory    Depression,  Anxiety.  Chronic and is stable.  Plan:  Continue citalopram 20 mg p.o. daily    History of prostate cancer.  Plan:  Follow-up as outpatient          Orders written by provider at facility:  None.          Electronically signed by:  Francisco PATTERSON  MD M[place]

## 2022-03-29 NOTE — PROGRESS NOTES
Clinic Care Coordination Contact    Background: Care Coordination referral placed from [facility] discharge report for reason of patient meeting criteria for a TCM outreach call by [facility] team.    Assessment: Upon chart review, [facility] Team member will cancel/close the referral for TCM outreach due to reason below:    Patient is not established within [facility]. Upon chart review, [facility] Team member noted patient discharged to TCU    Plan: Care Coordination referral for TCM outreach canceled.    Agnieszka Chapin MA  [facility], [facility]

## 2022-03-30 NOTE — LETTER
3/30/2022        RE: Meir Richardson  [address]  [facility] [address]        [place] [facility]  INITIAL VISIT NOTE  March 30, 2022    PRIMARY CARE PROVIDER AND CLINIC:  Fareed [facility] [address] [place] [address]  / [place] BAU[place] [id number]    CHIEF COMPLAINT:  Hospital follow-up/Initial visit    HPI:    Meir Richardson is a 71 year old  (1950) adult who was seen at [place] [facility] on March 30, 2022 for an initial visit.     Medical history is notable for Parkinson's disease, orthostatic hypotension with recurrent syncope, CKD, chronic anemia, migraine, Behcet's syndrome, melanoma, prostate cancer, frequent PVCs, s/p ablation, depression, anxiety, and recent open reduction and internal fixation of right femoral neck fracture on February 22, 2022.    Summary of hospital course:  Patient was hospitalized at [facility] from March 24 through March 28, 2022 for right total hip arthroplasty due to failed right hip femoral neck fracture fixation.  Surgery was performed on March 24, 2022.  Postop hemoglobin dropped to 7.9 from initial 9.8.  Notably, UA was abnormal on March 27, however culture grew no organism.  TCU was recommended per therapies.    Patient is admitted to this facility for medical management, nursing care, and rehab.     Of note, history was obtained from patient, facility RN, and extensive review of the chart.    Today's visit:  Patient was seen in his room, while lying in bed.  He appears frail but in no acute distress.  Surgical pain is fairly controlled.  He has a Coker catheter, placed in the hospital after surgery.  He denies fever, chills, chest pain, palpitation, dyspnea, nausea, vomiting, abdominal pain, or urinary symptoms.  He reports that he had a bowel movement last night.  Occasionally he feels lightheaded upon standing.      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 Resolved     Benign essential tremor      Irregular heart beat     PVC's     Migraine      Parkinsons disease (H)      Paroxysmal atrial tachycardia (H)     s/p ablation     PONV (postoperative nausea and vomiting)      Scapular dyskinesis      Syncope      Vitamin B 12 deficiency        PAST SURGICAL HISTORY:   Past Surgical History:   Procedure Laterality Date     ARTHROPLASTY REVISION HIP Right 3/24/2022    Procedure: removal of hardware right femoral neck to right total hip arthroplasty ; posterior approach;  Surgeon: Lito Basilio MD;  Location: [facility] OR     AV NODE ABLATION  2018    for atrial tachycardia     CARDIAC SURGERY      ablation     COLONOSCOPY       HAND SURGERY Right 2020     HERNIA REPAIR       OPEN REDUCTION INTERNAL FIXATION HIP NAILING Right 2022    Procedure: OPEN REDUCTION INTERNAL FIXATION, RIGHT HIP.;  Surgeon: Lito Basilio MD;  Location:  OR     OPEN REDUCTION INTERNAL FIXATION TOE(S) Right 2017    Procedure: OPEN REDUCTION INTERNAL FIXATION TOE(S);  RIGHT FIRST MTP FUSION, FOREFOOR RECONSTRUCTION;  Surgeon: Yfn Ulloa MD;  Location:  OR       FAMILY HISTORY:   Father had lymphoma, colon cancer, and RA who  at age 92 after a fall and brain bleed.  Mother  of brain hemorrhage at age 70.  One brother  of ALS at age 63.    SOCIAL HISTORY:  Social History     Tobacco Use     Smoking status: Never Smoker     Smokeless tobacco:  Never Used   Substance Use Topics     Alcohol use: No       MEDICATIONS:  Current Outpatient Medications   Medication Sig Dispense Refill     acetaminophen (TYLENOL) 500 MG tablet Take 1,000 mg by mouth 3 times daily       aspirin (ASA) 325 MG EC tablet Take 1 tablet (325 mg) by mouth daily 45 tablet 0     carbidopa (LODOSYN) 25 MG TABS tablet Take 25 mg by mouth 3 times daily       carbidopa-levodopa (SINEMET)  MG per tablet Take 1.5 tablets by mouth 3 times daily With food       carbidopa-levodopa (SINEMET)  MG tablet Take 1 tablet by mouth every evening as needed       citalopram (CELEXA) 20 MG tablet Take 20 mg by mouth every morning        cyclobenzaprine (FLEXERIL) 10 MG tablet Take 10 mg by mouth every morning        droxidopa (NORTHERA) 100 MG capsule Take 200 mg by mouth 3 times daily       ergotamine-caffeine (CAFERGOT) 1-100 MG tablet Take 2 tablets by mouth daily as needed for headaches or migraine Two tablets at onset of attack; then 1 tablet every 30 minutes as needed; maximum: 6 tablets per attack; do not exceed 10 tablets/week.       LEUPROLIDE ACETATE, 6 MONTH, SC Inject Subcutaneous every 6 months (Patient not taking: Reported on 3/29/2022)       midodrine (PROAMATINE) 5 MG tablet Take 15 mg by mouth 3 times daily       order for DME ADJUSTABLE LIGHTWEIGHT WALKER (Patient not taking: Reported on 3/29/2022) 1 Units 0     oxyCODONE (ROXICODONE) 5 MG tablet Take 1 tablet (5 mg) by mouth 2 times daily And BID PRN 30 tablet 0     Probiotic Product (PROBIOTIC DAILY PO) Take 1 capsule by mouth every evening        senna-docusate (SENOKOT-S/PERICOLACE) 8.6-50 MG tablet Take 1 tablet by mouth 2 times daily 40 tablet 0     SUMAtriptan (IMITREX) 50 MG tablet Take 50 mg by mouth daily as needed for migraine        vitamin D3 (CHOLECALCIFEROL) 50 mcg (2000 units) tablet Take 1 tablet (50 mcg) by mouth daily 30 tablet 0       Post Discharge Medication Reconciliation Status: discharge medications  reconciled, continue medications without change.        ALLERGIES:  Allergies   Allergen Reactions     Erythromycin Angioedema and Hives     Has tolerated Azithromycin in the past     Flecainide Headache     Tremors - Reported after 2nd dose also mentioned concurrent migraine (has a hx of migraines prior to flecainide)     Dronedarone Anxiety and Dizziness      Also gets the shakes and has difficulty sleeping     Keflex [Cephalexin] Rash     Penicillins Rash     Sulfa Drugs Rash     Tetracycline Rash       ROS:  10 point ROS were negative other than the symptoms noted above in the HPI.    PHYSICAL EXAM:  Vital signs were reviewed in the chart.  Vital Signs: /87   Pulse 91   Temp 97.8  F (36.6  C)   Resp 18   Ht 1.829 m (6')   Wt 68.2 kg (150 lb 6.4 oz)   SpO2 98%   BMI 20.40 kg/m    General: Frail appearing but comfortable and in no acute distress  HEENT: Conjunctival pallor  Cardiovascular: Normal S1, S2, RRR  Respiratory: Lungs clear to auscultation bilaterally  GI: Abdomen soft, non-tender, non-distended, +BS  Extremities: No LE edema  Neuro: CX II-XII grossly intact; ROM in all four extremities grossly intact; right hand tremor noted  Psych: Alert and oriented x3; normal affect  Skin: Right hip surgical wound is dressed and clean.    LABORATORY/IMAGING DATA:  All relevant labs and imaging data were reviewed personally today.      Most Recent 3 CBC's:Recent Labs   Lab Test 03/28/22  0632 03/26/22  0844 03/25/22  1202 02/23/22  0806 02/21/22  2149   WBC  --   --  4.2  --  3.1*   HGB 7.9* 7.9* 7.8*   < > 10.3*   MCV  --   --  100  --  100   PLT  --   --  180  --  193    < > = values in this interval not displayed.     Most Recent 3 BMP's:Recent Labs   Lab Test 03/28/22  0632 03/26/22  0844 03/25/22  1202    132* 135   POTASSIUM 3.8 3.9 4.1   CHLORIDE 103 101 101   CO2 31 27 31   BUN 30 34* 31*   CR 1.37* 1.38* 1.51*   ANIONGAP 3 4 3   SAMUEL 9.3 9.0 8.9   GLC 96 96 104*          ASSESSMENT/PLAN:  Failed right hip femoral neck fracture fixation, s/p conversion to right total hip arthroplasty on March 24, 2022,  Physical deconditioning.  Patient is hemodynamically stable.  Surgical pain is controlled.  Plan:  Fall precautions  WBAT with posterior hip precautions  Continue pain management with scheduled acetaminophen 1000 mg p.o. 3 times daily and both scheduled and as needed oxycodone as ordered  Continue DVT prophylaxis with aspirin 325 mg p.o. for total of 6 weeks  Mobilize with PT/OT  Follow-up with Ortho as recommended    Acute blood loss anemia,  Chronic anemia.  Baseline hemoglobin in the range of 10-12.  Drop in hemoglobin due to blood loss of orthopedic surgeries.  Last hemoglobin was stable at 7.9 on March 28.  Plan:  Monitor hemoglobin periodically  Next CBC is scheduled for April 1    History of frequent PVCs, s/p ablation of RVOT PVC's in June 2016.  Plan:  Monitor heart rate    Parkinson's disease.  Stable.  Plan:  Fall precautions  Staff to assist with mobility  Continue carbidopa-levodopa  mg, 1.5 mg X p.o. 3 times daily and 1 tablet p.o. every evening as needed  Continue carbidopa 25 mg p.o. 3 times daily    Orthostatic hypotension with history of recurrent syncope.  Occasional lightheadedness upon standing.  Plan:  Continue midodrine 15 mg p.o. 3 times daily and droxidopa 200 mg p.o. 3 times daily  Continue with IV fluids 3 times a week  Standard orthostatic hypotension precautions    CKD stage IIIa.  Baseline creatinine around 1.3-1.5.  Last creatinine was stable at 1.37 on March 28.  Plan:  Avoid NSAIDs and nephrotoxins  Monitor renal function periodically  Next BMP is scheduled for April 1    Urinary retention.  Coker in place.  Plan  Continue Coker catheter  Voiding trial once patient is more ambulatory    Depression,  Anxiety.  Chronic and is stable.  Plan:  Continue citalopram 20 mg p.o. daily    History of prostate cancer.  Plan:  Follow-up as  outpatient          Orders written by provider at facility:  None.          Electronically signed by:  Francisco Banegas MD                        Sincerely,        Francisco Banegas MD     warm

## 2022-04-05 NOTE — PROGRESS NOTES
[facility] GERIATRICS DISCHARGE SUMMARY  PATIENT'S NAME: Meir Richardson  YOB: 1950  MEDICAL RECORD NUMBER:  [id number]  Place of Service where encounter took place:  [facility] ([facility] [[id number]]    PRIMARY CARE PROVIDER AND CLINIC RESPONSIBLE AFTER TRANSFER:   Fareed Webb MD, [address]  / [place] [address]    Non-FMG Provider     Transferring providers: MANUEL Velasco CNP, Dr. Makenzie MD  Recent Hospitalization/ED:  [facility] stay 3/24/22 to 3/28/22.  Date of SNF Admission: 3/28/22  Date of SNF (anticipated) Discharge: 4/6/22  Discharged to: previous independent home  Cognitive Scores: SLUMS: 23/30  Physical Function: Ambulating 150 ft with 2ww  DME: No new DME needed    CODE STATUS/ADVANCE DIRECTIVES DISCUSSION:  Prior DNR/DNI  ALLERGIES: Erythromycin, Flecainide, Dronedarone, Keflex [cephalexin], Penicillins, Sulfa drugs, and Tetracycline    NURSING FACILITY COURSE   Medication Changes/Rationale:     None     Per recent TCU provider progress notes:   71 year old male with PMH significant for Behcets disease, parkinson's disease, paroxysmal atrial tachycardia s/p ablation, CKD, orthostatic hypotension with recurrent syncope and recent incomplete right femoral neck fracture (nontraumatic, pathologic/insufficiency fracture) s/p percutaneous pin of right hip (2/22/22) who was admitted by Ortho 3/24/22 for failed fixation and underwent conversion to right total hip arthroplasty. WBAT, on ASA x 6 wks for DVT prophylaxis. Acute on chronic anemia with Hgb 7.9. On midodrine due to orthostatic hypotension. CKD3 Cr 1.3-1.5. Urinary retention, Melgar in place. Hyponatremia resolved. To TCU for therapies.     Patient seen for discharge visit. Attempting voiding trial today - replace melgar and f/u urology if fails. Pain managed adequately. Mobility improved. Has been receiving IV fluids 3 x week while at TCU, resume outpatient visits  for fluids per home routine on discharge. Home with home care as ordered below. Recent BP range /60-75 and sats 96% room air.     Summary of nursing facility stay:   Failed right hip femoral neck fracture fixation, s/p conversion to right total hip arthroplasty  Physical deconditioning  S/P surgical repair, mobility improved and WBAT. Continue pain management with scheduled acetaminophen 1000 mg p.o. 3 times daily and PRN oxycodone. Continue DVT prophylaxis with aspirin 325 mg p.o. for total of 6 weeks. Home with home care as below. Ortho f/u 6 weeks as recommended.     Acute blood loss anemia  Chronic anemia  Baseline hemoglobin in the range of 10-12. Hgb 7.9 at discharge, 7.4 on 3/31/22. Recheck at PCP follow up. No s/s bleeding.     Parkinson's disease  Stable. Fall precautions. Continue carbidopa-levodopa  mg, 1.5 mg X p.o. 3 times daily and 1 tablet p.o. every evening as needed and carbidopa 25 mg p.o. 3 times daily. Neurology f/u per home routine.     Orthostatic hypotension with history of recurrent syncope  Chronic. Continue midodrine 15 mg p.o. 3 times daily and droxidopa 200 mg p.o. 3 times daily. Continue with IV fluids 3 times a week as outpatient.     CKD stage IIIa  Baseline creatinine around 1.3-1.5. Last creatinine was stable at 1.37 on March 28. Avoid NSAIDs and nephrotoxins.     Urinary retention  Voiding trial today, str cath if retains over 300 ml PVR. Urology consult if fails voiding trial.     Depression  Anxiety  Chronic and is stable. Continue citalopram 20 mg p.o. daily.     Discharge Medications:  Current Outpatient Medications   Medication Sig Dispense Refill     acetaminophen (TYLENOL) 500 MG tablet Take 1,000 mg by mouth 3 times daily       aspirin (ASA) 325 MG EC tablet Take 1 tablet (325 mg) by mouth daily 45 tablet 0     carbidopa (LODOSYN) 25 MG TABS tablet Take 25 mg by mouth 3 times daily       carbidopa-levodopa (SINEMET)  MG per tablet Take 1.5 tablets by mouth  3 times daily With food       carbidopa-levodopa (SINEMET)  MG tablet Take 1 tablet by mouth every evening as needed       citalopram (CELEXA) 20 MG tablet Take 20 mg by mouth every morning        cyclobenzaprine (FLEXERIL) 10 MG tablet Take 10 mg by mouth every morning        droxidopa (NORTHERA) 100 MG capsule Take 200 mg by mouth 3 times daily       ergotamine-caffeine (CAFERGOT) 1-100 MG tablet Take 2 tablets by mouth daily as needed for headaches or migraine Two tablets at onset of attack; then 1 tablet every 30 minutes as needed; maximum: 6 tablets per attack; do not exceed 10 tablets/week.       midodrine (PROAMATINE) 5 MG tablet Take 15 mg by mouth 3 times daily       oxyCODONE (ROXICODONE) 5 MG tablet Take 1 tablet (5 mg) by mouth 4 times daily as needed for severe pain 10 tablet 0     Probiotic Product (PROBIOTIC DAILY PO) Take 1 capsule by mouth every evening        senna-docusate (SENOKOT-S/PERICOLACE) 8.6-50 MG tablet Take 1 tablet by mouth 2 times daily 40 tablet 0     SUMAtriptan (IMITREX) 50 MG tablet Take 1 tablet (50 mg) by mouth daily as needed for migraine 10 tablet 0     vitamin D3 (CHOLECALCIFEROL) 50 mcg (2000 units) tablet Take 1 tablet (50 mcg) by mouth daily 30 tablet 0     LEUPROLIDE ACETATE, 6 MONTH, SC Inject Subcutaneous every 6 months (Patient not taking: Reported on 3/29/2022)       order for DME ADJUSTABLE LIGHTWEIGHT WALKER (Patient not taking: Reported on 3/29/2022) 1 Units 0     Controlled medications:   oxycodone 5 mg tabs #10 no refills sent to pharmacy     Past Medical History:   Past Medical History:   Diagnosis Date     Behcet's syndrome (H)     diagnosed 1970's. Resolved     Benign essential tremor      Irregular heart beat     PVC's     Migraine      Parkinsons disease (H)      Paroxysmal atrial tachycardia (H)     s/p ablation     PONV (postoperative nausea and vomiting)      Scapular dyskinesis      Syncope      Vitamin B 12 deficiency      Physical Exam:   Vitals:  /69   Pulse 86   Temp 97.1  F (36.2  C)   Resp 16   Wt 67.3 kg (148 lb 6.4 oz)   SpO2 96%   BMI 20.13 kg/m    BMI: Body mass index is 20.13 kg/m .  Exam is limited due to COVID-19 precautions  GENERAL APPEARANCE:  Alert, in no distress, cooperative  ENT:  Mouth normal, moist mucous membranes, normal hearing acuity  EYES:  Conjunctiva and lids normal  RESP:  no respiratory distress  NEURO:   No facial asymmetry, speech clear  PSYCH:  oriented X 3, flat affect, mood normal     SNF labs:   3/31/22: WBC 3.4, Hgb 7.4,   Most Recent 3 CBC's:  Recent Labs   Lab Test 03/28/22  0632 03/26/22  0844 03/25/22  1202 02/23/22  0806 02/21/22  2149   WBC  --   --  4.2  --  3.1*   HGB 7.9* 7.9* 7.8*   < > 10.3*   MCV  --   --  100  --  100   PLT  --   --  180  --  193    < > = values in this interval not displayed.     3/31/22: Na 137, K 3.9, Cr 1.34, BUN 37  Most Recent 3 BMP's:  Recent Labs   Lab Test 03/28/22  0632 03/26/22  0844 03/25/22  1202    132* 135   POTASSIUM 3.8 3.9 4.1   CHLORIDE 103 101 101   CO2 31 27 31   BUN 30 34* 31*   CR 1.37* 1.38* 1.51*   ANIONGAP 3 4 3   SAMUEL 9.3 9.0 8.9   GLC 96 96 104*       DISCHARGE PLAN:    Follow up labs: Hgb recheck at PCP follow up     Medical Follow Up:      Follow up with primary care provider in 2-3 weeks  Follow up with specialist ortho as recommended   Follow up with consultant neurology per home routine, UROLOGY PRN ongoing retention    Current [facility] scheduled appointments:   No future appointments.     Discharge Services: Home Care:  Occupational Therapy, Physical Therapy, Registered Nurse, Home Health Aide and From:  [facility] Patient also requires home infusion services for  ml IV three times weekly infuse at 250 ml/hr due to hypotension    Discharge Instructions Verbalized to Patient at Discharge:     DO NOT DRIVE while taking narcotic pain medications.     TOTAL DISCHARGE TIME:   Greater than 30 minutes  Electronically signed  by:  MANUEL Velasco CNP     Home care Face to Face documentation done in [facility] attached to Home care orders for [facility].

## 2022-04-11 NOTE — PROGRESS NOTES
This is a recent snapshot of the patient's [facility] medical record.  For current drug dose and complete information and questions, call [phone] or In Basket pool, fv home infusion ([id number])  CSN Number:  [id number]

## 2022-04-14 NOTE — DISCHARGE SUMMARY
Discharge Summary    Meir Richardson MRN# [id number]   YOB: 1950 Age: 71 year old     Date of Admission:  3/24/2022  Date of Discharge:  4/15/22  Admitting Physician:  Lito Basilio MD  Discharge Physician:  Lito Basilio MD     Primary Provider: Donnell Webb          Admission Diagnoses:   Failed Femoral Neck Fracture fixation, right hip          Discharge Diagnosis:   Same           Surgical Procedure:   1.  Conversion of previous surgery to right total hip arthroplasty.  Posterior lateral   approach.  2.  Autologous Bone grafting right femur           Discharge Disposition:     Discharged to short-term care facility           Medications Prior to Admission:     No medications prior to admission.             Discharge Medications:     Discharge Medication List as of 3/28/2022  1:29 PM      START taking these medications    Details   senna-docusate (SENOKOT-S/PERICOLACE) 8.6-50 MG tablet Take 1 tablet by mouth 2 times daily, Disp-40 tablet, R-0, E-Prescribe         CONTINUE these medications which have CHANGED    Details   aspirin (ASA) 325 MG EC tablet Take 1 tablet (325 mg) by mouth daily, Disp-45 tablet, R-0, E-Prescribe      acetaminophen (TYLENOL) 325 MG tablet Take 2 tablets (650 mg) by mouth every 6 hours as needed for other (For optimal non-opioid multimodal pain management to improve pain control.), Disp-60 tablet, R-0, E-Prescribe      oxyCODONE (ROXICODONE) 5 MG tablet Take 0.5-1 tablets (2.5-5 mg) by mouth every 4 hours as needed, Disp-30 tablet, R-0, Local PrintTake 1/2 tablet for pain rated 1-5 and 1 tablets for pain rated 6-10         CONTINUE these medications which have NOT CHANGED    Details   carbidopa (LODOSYN) 25 MG TABS tablet Take 25 mg by mouth 3 times daily, Historical      !! carbidopa-levodopa (SINEMET)  MG per tablet Take 1.5 tablets by mouth 3 times daily With food, Historical      !! carbidopa-levodopa (SINEMET)  MG tablet Take 1 tablet by mouth  every evening as needed, Historical      citalopram (CELEXA) 20 MG tablet Take 20 mg by mouth every morning , Historical      cyclobenzaprine (FLEXERIL) 10 MG tablet Take 10 mg by mouth every morning , Historical      droxidopa (NORTHERA) 100 MG capsule Take 200 mg by mouth 3 times daily, Historical      ergotamine-caffeine (CAFERGOT) 1-100 MG tablet Take 2 tablets by mouth daily as needed for headaches or migraine Two tablets at onset of attack; then 1 tablet every 30 minutes as needed; maximum: 6 tablets per attack; do not exceed 10 tablets/week., Historical      LEUPROLIDE ACETATE, 6 MONTH, SC Inject Subcutaneous every 6 months, Historical      midodrine (PROAMATINE) 5 MG tablet Take 15 mg by mouth 3 times daily, Historical      order for DME ADJUSTABLE LIGHTWEIGHT WALKERDisp-1 Units, R-0, Local Print      Probiotic Product (PROBIOTIC DAILY PO) Take 1 capsule by mouth every evening , Historical      vitamin D3 (CHOLECALCIFEROL) 50 mcg (2000 units) tablet Take 1 tablet (50 mcg) by mouth daily, Disp-30 tablet, R-0, E-Prescribe      SUMAtriptan (IMITREX) 50 MG tablet Take 50 mg by mouth daily as needed for migraine , Historical       !! - Potential duplicate medications found. Please discuss with provider.      STOP taking these medications       ibuprofen (ADVIL/MOTRIN) 600 MG tablet Comments:   Reason for Stopping:         sennosides (SENOKOT) 8.6 MG tablet Comments:   Reason for Stopping:         SUMAtriptan Succinate Refill (IMITREX) 6 MG/0.5ML SOCT Comments:   Reason for Stopping:                     Consultations:     Social work: discharge planning and TCU placement  PT/OT  Hospital Medicine: co-management of co-morbidities            Hospital Course:     The patient was admitted after the surgical procedure.The patient underwent the above mentioned procedure. Postoperatively, Aspirn was prescribed for DVT prophylaxis. Home medications have been reconciled. oxycodone 5 mg. was prescribed for pain.              Discharge Instructions and Follow-Up:          Discharge activity: WBAT with a walker   Discharge follow-up: Follow-up with Dr. Basilio in ~14 days post-op. [phone]   Outpatient therapy: Should already be arranged by office.  If not, patient should call to schedule 2x/week x 3 weeks.   Home Care agency: None   Supplies and equipment: None        Wound care: Leave dressing intact until your 2 week follow-up appointment. Okay to Shower.   Other instructions: Posterior hip precautions.  No hip flexion up past 90deg.  No ADduction of the surgical leg across the midline.     Ronak De La Fuente PA-C

## 2022-04-18 NOTE — PROGRESS NOTES
This is a recent snapshot of the patient's [facility] medical record.  For current drug dose and complete information and questions, call [phone] or In Basket pool, fv home infusion ([id number])  CSN Number:  [id number]

## 2022-04-21 NOTE — PROGRESS NOTES
This is a recent snapshot of the patient's [facility] medical record.  For current drug dose and complete information and questions, call [phone] or In Basket pool, fv home infusion ([id number])  CSN Number:  [id number]

## 2022-04-28 NOTE — PROGRESS NOTES
This is a recent snapshot of the patient's [facility] medical record.  For current drug dose and complete information and questions, call [phone] or In Basket pool, fv home infusion ([id number])  CSN Number:  [id number]

## 2022-05-04 NOTE — PROGRESS NOTES
This is a recent snapshot of the patient's [facility] medical record.  For current drug dose and complete information and questions, call [phone] or In Basket pool, fv home infusion ([id number])  CSN Number:  [id number]

## 2022-05-06 NOTE — PROGRESS NOTES
This is a recent snapshot of the patient's [facility] medical record.  For current drug dose and complete information and questions, call [phone] or In Basket pool, fv home infusion ([id number])  CSN Number:  [id number]

## 2022-05-13 NOTE — ED TRIAGE NOTES
Pt Had catheter removed yesterday. PT has some urination after. Family taught to self cath, but meeting resistance. Pt has feeling to urinate.      Triage Assessment     Row Name 05/13/22 6531       Triage Assessment (Adult)    Airway WDL WDL       Respiratory WDL    Respiratory WDL --  At baseline, denies SOB       Skin Circulation/Temperature WDL    Skin Circulation/Temperature WDL WDL       Cardiac WDL    Cardiac WDL --  Denies CP       Peripheral/Neurovascular WDL    Peripheral Neurovascular WDL WDL       Cognitive/Neuro/Behavioral WDL    Cognitive/Neuro/Behavioral WDL WDL

## 2022-05-13 NOTE — ED PROVIDER NOTES
History     Chief Complaint:  Catheter Problem       HPI   Meir Richardson is a 71 year old adult with a hx of recent hip surgery, prostate cancer, urinary retention, Parkinson's disease, presents emergency room today for evaluation of urinary retention.  He had hip surgery a couple weeks ago, had postop urinary retention, had an indwelling Coker since, had failed trial without catheter, had inserted at [facility] emergency room on 5/3/2022.  Then yesterday he was at the urology office, past trial without a catheter, was urinating all evening however then woke this morning and was unable to go.  He denies any new symptoms like back pain, fever, nausea, vomiting, or other.  No lower extremity weakness, numbness, bowel dysfunction, saddle anesthesia.     ROS:  Review of Systems   Gastrointestinal: Positive for abdominal pain. Negative for nausea and vomiting.   Genitourinary: Positive for dysuria, frequency and urgency.   Musculoskeletal: Negative for back pain.      All other systems reviewed and are negative.    Allergies:  Erythromycin  Flecainide  Dronedarone  Keflex [Cephalexin]  Penicillins  Sulfa Drugs  Tetracycline     Medications:    cefdinir (OMNICEF) 300 MG capsule  acetaminophen (TYLENOL) 500 MG tablet  aspirin (ASA) 325 MG EC tablet  carbidopa (LODOSYN) 25 MG TABS tablet  carbidopa-levodopa (SINEMET)  MG per tablet  carbidopa-levodopa (SINEMET)  MG tablet  citalopram (CELEXA) 20 MG tablet  cyclobenzaprine (FLEXERIL) 10 MG tablet  droxidopa (NORTHERA) 100 MG capsule  ergotamine-caffeine (CAFERGOT) 1-100 MG tablet  LEUPROLIDE ACETATE, 6 MONTH, SC  midodrine (PROAMATINE) 5 MG tablet  order for DME  oxyCODONE (ROXICODONE) 5 MG tablet  Probiotic Product (PROBIOTIC DAILY PO)  senna-docusate (SENOKOT-S/PERICOLACE) 8.6-50 MG tablet  SUMAtriptan (IMITREX) 50 MG tablet  vitamin D3 (CHOLECALCIFEROL) 50 mcg (2000 units) tablet        Past Medical History:    Past Medical History:   Diagnosis Date      Behcet's syndrome (H)      Benign essential tremor      Irregular heart beat      Migraine      Parkinsons disease (H)      Paroxysmal atrial tachycardia (H)      PONV (postoperative nausea and vomiting)      Scapular dyskinesis      Syncope      Vitamin B 12 deficiency      Patient Active Problem List   Diagnosis     Fracture of metatarsal bone of right foot, physeal involvement unspecified, unspecified metatarsal, initial encounter     Closed displaced fracture of right femoral neck (H)     Abnormal weight loss     Anemia     Anxiety     Behcet's syndrome (H)     Benign essential tremor     Cervicalgia     Dyspnea on exertion     Edema of extremities     Fatigue     Impingement syndrome of right shoulder     Intractable common migraine     Malignant tumor of prostate (H)     Scapular dyskinesis     PVC (premature ventricular contraction)     Pathological dislocation of hand joint     PAT (paroxysmal atrial tachycardia) (H)     Parkinson's disease (H)     Palpitations     Pain in joint     Orthostatic hypotension     Nontraumatic extensor tendon dislocation of hand, right     Nonspecific abnormal electrocardiogram (ECG) (EKG)     Nephrolithiasis     Migraine     Vitamin B12 deficiency        Past Surgical History:    Past Surgical History:   Procedure Laterality Date     ARTHROPLASTY REVISION HIP Right 3/24/2022    Procedure: removal of hardware right femoral neck to right total hip arthroplasty ; posterior approach;  Surgeon: Lito Basilio MD;  Location:  OR     AV NODE ABLATION  2018    for atrial tachycardia     CARDIAC SURGERY      ablation     COLONOSCOPY       HAND SURGERY Right 09/2020     HERNIA REPAIR       OPEN REDUCTION INTERNAL FIXATION HIP NAILING Right 2/22/2022    Procedure: OPEN REDUCTION INTERNAL FIXATION, RIGHT HIP.;  Surgeon: Lito Basilio MD;  Location:  OR     OPEN REDUCTION INTERNAL FIXATION TOE(S) Right 05/11/2017    Procedure: OPEN REDUCTION INTERNAL FIXATION TOE(S);   RIGHT FIRST MTP FUSION, FOREFOOR RECONSTRUCTION;  Surgeon: Yfn Ulloa MD;  Location:  OR        Family History:    family history is not on file.    Social History:   reports that Meir Richardson has never smoked. Meir Richardson has never used smokeless tobacco. Meir Richardson reports that Meir Richardson does not drink alcohol and does not use drugs.  PCP: Fareed Webb     Physical Exam     Patient Vitals for the past 24 hrs:   BP Temp Temp src Pulse Resp SpO2   05/13/22 1638 -- -- -- -- -- 97 %   05/13/22 1637 109/71 -- -- 90 -- --   05/13/22 1450 104/62 97.2  F (36.2  C) Temporal 95 16 97 %        Physical Exam  General: Well appearing, pleasant male, resting on exam bed  HEENT: No evidence of trauma.  Conjunctive are clear.  Extraocular eye movements intact.  Neck range of motion intact.  Nose and throat clear.  Respiratory: Good breath sounds bilaterally  Cardiovascular: Normal rate and rhythm   Gastrointestinal: Soft, nontender.   Musculoskeletal: Atraumatic  Skin: Exposed skin clear.  Neurologic: Alert.  Psych:  Patient is cooperative, with normal affect.      Emergency Department Course   ECG:  none    Imaging:  No orders to display        Laboratory:  Labs Ordered and Resulted from Time of ED Arrival to Time of ED Departure   BASIC METABOLIC PANEL - Abnormal       Result Value    Sodium 136      Potassium 4.4      Chloride 105      Carbon Dioxide (CO2) 24      Anion Gap 7      Urea Nitrogen 31 (*)     Creatinine 1.43 (*)     Calcium 9.9      Glucose 102 (*)     GFR Estimate 53 (*)    URINE MACROSCOPIC WITH REFLEX TO MICRO - Abnormal    Color Urine Straw      Appearance Urine Slightly Cloudy (*)     Glucose Urine Negative      Bilirubin Urine Negative      Ketones Urine Negative      Specific Gravity Urine 1.012      Blood Urine Trace (*)     pH Urine 6.0      Protein Albumin Urine 30  (*)     Urobilinogen Urine Normal      Nitrite Urine Negative      Leukocyte Esterase Urine Large (*)      Bacteria Urine Many (*)     RBC Urine 4 (*)     WBC Urine >182 (*)     WBC Clumps Urine Present (*)     Mucus Urine Present (*)     Hyaline Casts Urine 4 (*)    CBC WITH PLATELETS AND DIFFERENTIAL - Abnormal    WBC Count 5.1      RBC Count 3.29 (*)     Hemoglobin 10.2 (*)     Hematocrit 32.3 (*)     MCV 98      MCH 31.0      MCHC 31.6      RDW 12.6      Platelet Count 193      % Neutrophils 69      % Lymphocytes 16      % Monocytes 8      % Eosinophils 5      % Basophils 1      % Immature Granulocytes 1      NRBCs per 100 WBC 0      Absolute Neutrophils 3.6      Absolute Lymphocytes 0.8      Absolute Monocytes 0.4      Absolute Eosinophils 0.3      Absolute Basophils 0.0      Absolute Immature Granulocytes 0.0      Absolute NRBCs 0.0          Interventions:  Medications   lidocaine (XYLOCAINE) 2 % external gel 10 mL (10 mLs Urethral Given 5/13/22 3612)        Impression & Plan      Medical Decision Making:  Meir Richardson is a 71 year old adult with a hx of recent hip surgery, prostate cancer, urinary retention, Parkinson's disease, presents emergency room today for evaluation of urinary retention.  See HPI.  His vitals are unremarkable.  He has a reassuring exam and is in no acute distress.  He baseline anemia.  No white count.  Baseline creatinine.  His urine appears infected, likely expected with intermittent cathing, but true infection also could be playing a part today.  I reviewed his chart and he has had intermittent urinary retention with failed trial without catheter attempts.  He had a successful attempt yesterday however retaining urine this morning.  Bladder scan reveals around 800 mL here.  He is quite uncomfortable.  We replaced the catheter and he feels much better.  Urine still shows infection.  He had a urine culture completed that revealed E. coli on May 5.  It was resistant to many antibiotics.  He was on Cipro, Macrobid, and trimethoprim.  All of these, resistant.  He does not seem to have any  UTI symptoms other than retention but will cover him with cefdinir as this was sensitive.  He did have a side effect to Keflex, a rash, so we will monitor for any adverse reactions to cefdinir.  Creatinine clearance calculated.  No dosing adjustment needed.  We will hold off on any further work-up at this time.  His vitals are stable.  Follow-up with primary care and urology is indicated.  He is to return with new or worsening symptoms.  No further questions and agrees with plan.    Diagnosis:    ICD-10-CM    1. Urinary retention  R33.9    2. Anemia, unspecified type  D64.9    3. Elevated serum creatinine  R79.89         Discharge Medications:  New Prescriptions    CEFDINIR (OMNICEF) 300 MG CAPSULE    Take 1 capsule (300 mg) by mouth 2 times daily for 7 days        5/13/2022   Galileo Vincent PA-C Cyr, Matthew R, PA-C  05/13/22 1643

## 2022-05-18 NOTE — PROGRESS NOTES
This is a recent snapshot of the patient's [facility] medical record.  For current drug dose and complete information and questions, call [phone] or In Basket pool, fv home infusion ([id number])  CSN Number:  [id number]

## 2022-06-03 NOTE — PROGRESS NOTES
This is a recent snapshot of the patient's [facility] medical record.  For current drug dose and complete information and questions, call [phone] or In Basket pool, fv home infusion ([id number])  CSN Number:  [id number]

## 2022-06-05 NOTE — PROGRESS NOTES
This is a recent snapshot of the patient's [facility] medical record.  For current drug dose and complete information and questions, call [phone] or In [facility] pool, fv home infusion ([id number])  CSN Number:  [id number]

## 2022-06-07 NOTE — PROGRESS NOTES
This is a recent snapshot of the patient's [facility] medical record.  For current drug dose and complete information and questions, call [phone] or In Basket pool, fv home infusion ([id number])  CSN Number:  [id number]

## 2022-06-16 NOTE — PROGRESS NOTES
This is a recent snapshot of the patient's [facility] medical record.  For current drug dose and complete information and questions, call [phone] or In Basket pool, fv home infusion ([id number])  CSN Number:  [id number]

## 2022-06-17 NOTE — PROGRESS NOTES
This is a recent snapshot of the patient's [facility] medical record.  For current drug dose and complete information and questions, call [phone] or In Basket pool, fv home infusion ([id number])  CSN Number:  [id number]

## 2022-06-28 NOTE — PROGRESS NOTES
This is a recent snapshot of the patient's [facility] medical record.  For current drug dose and complete information and questions, call [phone] or In Basket pool, fv home infusion ([id number])  CSN Number:  [id number]

## 2022-06-30 NOTE — PROGRESS NOTES
This is a recent snapshot of the patient's [facility] medical record.  For current drug dose and complete information and questions, call [phone] or In Basket pool, fv home infusion ([id number])  CSN Number:  [id number]

## 2022-07-08 NOTE — PROGRESS NOTES
This is a recent snapshot of the patient's [facility] medical record.  For current drug dose and complete information and questions, call [phone] or In Basket pool, fv home infusion ([id number])  CSN Number:  [id number]

## 2022-07-21 NOTE — PROGRESS NOTES
This is a recent snapshot of the patient's [facility] medical record.  For current drug dose and complete information and questions, call [phone] or In [facility] pool, fv home infusion ([id number])  CSN Number:  [id number]

## 2022-07-25 NOTE — PROGRESS NOTES
This is a recent snapshot of the patient's [facility] medical record.  For current drug dose and complete information and questions, call [phone] or In Basket pool, fv home infusion ([id number])  CSN Number:  [id number]

## 2022-07-25 NOTE — PROGRESS NOTES
This is a recent snapshot of the patient's [facility] medical record.  For current drug dose and complete information and questions, call [phone] or In Basket pool, fv home infusion ([id number])  CSN Number:  [id number]

## 2022-07-29 NOTE — PROGRESS NOTES
This is a recent snapshot of the patient's [facility] medical record.  For current drug dose and complete information and questions, call [phone] or In Basket pool, fv home infusion ([id number])  CSN Number:  [id number]

## 2022-08-01 NOTE — PROGRESS NOTES
This is a recent snapshot of the patient's [facility] medical record.  For current drug dose and complete information and questions, call [phone] or In [facility] pool, fv home infusion ([id number])  CSN Number:  [id number]

## 2022-08-05 NOTE — PROGRESS NOTES
This is a recent snapshot of the patient's [facility] medical record.  For current drug dose and complete information and questions, call [phone] or In Basket pool, fv home infusion ([id number])  CSN Number:  [id number]

## 2022-08-11 NOTE — PROGRESS NOTES
This is a recent snapshot of the patient's [facility] medical record.  For current drug dose and complete information and questions, call [phone] or In [facility] pool, fv home infusion ([id number])  CSN Number:  [id number]

## 2022-08-25 NOTE — PROGRESS NOTES
This is a recent snapshot of the patient's [facility] medical record.  For current drug dose and complete information and questions, call [phone] or In Basket pool, fv home infusion ([id number])  CSN Number:  [id number]

## 2022-08-29 NOTE — PROGRESS NOTES
This is a recent snapshot of the patient's [facility] medical record.  For current drug dose and complete information and questions, call [phone] or In Basket pool, fv home infusion ([id number])  CSN Number:  [id number]

## 2022-09-07 NOTE — PROGRESS NOTES
This is a recent snapshot of the patient's [facility] medical record.  For current drug dose and complete information and questions, call [phone] or In Basket pool, fv home infusion ([id number])  CSN Number:  [id number]

## 2022-09-08 NOTE — TELEPHONE ENCOUNTER
..............      [facility]    Meir Richardson MRN# [id number]   Age: 71 year old YOB: 1950     Requesting physician: No ref. provider found  Fareed Webb            Assessment and Plan:   Assessment:  1.  Syncope - presumed hypotensive but additional sx suggest possibility of seizure activity instead  2.  Parkinson's disease - Followed at [facility] by Dr. Scott  3.  History of migraine headaches  4.  Prostate cancer  5.  Multiple falls resulting in right hip fracture and prior bone injuries to feet  6.  History of anxiety and depression     Plan:  1.  I am asked to evaluate for possibility of seizures and will arrange an outpatient 2-day EEG recording with [facility] through the [facility]    After reviewing his circumstance at length with Dr. Webb,  Mr. Richardson and his wife, we have elected to proceed with in-home EEG monitoring for perhaps a 48-hour span to identify if there is or is not a seizure source for his syncope and falls.           History of Present Illness:   CC:  A 30-minute telephone discussion was held with Mr. Richardson and his wife, Lola, in follow-up to a telephone discussion I had with his primary physician, Dr. Fareed Webb.  The issue presented to me dealt with Mr. Richardson's frequent episodes of presyncope and syncope.  These occur almost exclusively while sitting or standing and seem to be preceded by a sensation most consistent with hypotension.  He has fallen quite frequently and sustained multiple orthopedic injuries as result.  Management of his lower blood pressures initially with medications and more recently with IV infusions have helped but have not ameliorated all of these episodes.  His more recent history suggests that he does not necessarily lose consciousness but has had some altered content of consciousness which has suggested the possibility of seizure activity as opposed to a  hypoperfusion source of his syncope.    After discussing this issue at length with Dr. Webb by phone, I visited Mr. Richardson at his home.  His Parkinson's disease appears to be primarily bradykinetic with tremor and rigidity although his cognitive skills appear to be quite well preserved.  His blood pressures do drop when he stands not necessarily accompanied by clinical complaints.               Physical Exam:            Data:   All laboratory data reviewed  All imaging studies reviewed by me             DATA for DOCUMENTATION:         Past Medical History:     Patient Active Problem List   Diagnosis     Fracture of metatarsal bone of right foot, physeal involvement unspecified, unspecified metatarsal, initial encounter     Closed displaced fracture of right femoral neck (H)     Abnormal weight loss     Anemia     Anxiety     Behcet's syndrome (H)     Benign essential tremor     Cervicalgia     Dyspnea on exertion     Edema of extremities     Fatigue     Impingement syndrome of right shoulder     Intractable common migraine     Malignant tumor of prostate (H)     Scapular dyskinesis     PVC (premature ventricular contraction)     Pathological dislocation of hand joint     PAT (paroxysmal atrial tachycardia) (H)     Parkinson's disease (H)     Palpitations     Pain in joint     Orthostatic hypotension     Nontraumatic extensor tendon dislocation of hand, right     Nonspecific abnormal electrocardiogram (ECG) (EKG)     Nephrolithiasis     Migraine     Vitamin B12 deficiency     Past Medical History:   Diagnosis Date     Behcet's syndrome (H)     diagnosed 1970's. Resolved     Benign essential tremor      Irregular heart beat     PVC's     Migraine      Parkinsons disease (H)      Paroxysmal atrial tachycardia (H)     s/p ablation     PONV (postoperative nausea and vomiting)      Scapular dyskinesis      Syncope      Vitamin B 12 deficiency        Also see scanned health assessment forms.       Past Surgical History:      Past Surgical History:   Procedure Laterality Date     ARTHROPLASTY REVISION HIP Right 3/24/2022    Procedure: removal of hardware right femoral neck to right total hip arthroplasty ; posterior approach;  Surgeon: Lito Basilio MD;  Location:  OR     AV NODE ABLATION  2018    for atrial tachycardia     CARDIAC SURGERY      ablation     COLONOSCOPY       HAND SURGERY Right 09/2020     HERNIA REPAIR       OPEN REDUCTION INTERNAL FIXATION HIP NAILING Right 2/22/2022    Procedure: OPEN REDUCTION INTERNAL FIXATION, RIGHT HIP.;  Surgeon: Lito Basilio MD;  Location:  OR     OPEN REDUCTION INTERNAL FIXATION TOE(S) Right 05/11/2017    Procedure: OPEN REDUCTION INTERNAL FIXATION TOE(S);  RIGHT FIRST MTP FUSION, FOREFOOR RECONSTRUCTION;  Surgeon: Yfn Ulloa MD;  Location:  OR            Social History:     Social History     Socioeconomic History     Marital status:      Spouse name: Not on file     Number of children: Not on file     Years of education: Not on file     Highest education level: Not on file   Occupational History     Not on file   Tobacco Use     Smoking status: Never Smoker     Smokeless tobacco: Never Used   Vaping Use     Vaping Use: Never used   Substance and Sexual Activity     Alcohol use: No     Drug use: No     Sexual activity: Not on file   Other Topics Concern     Not on file   Social History Narrative     Not on file     Social Determinants of Health     Financial Resource Strain: Not on file   Food Insecurity: Not on file   Transportation Needs: Not on file   Physical Activity: Not on file   Stress: Not on file   Social Connections: Not on file   Intimate Partner Violence: Not on file   Housing Stability: Not on file              Family History:   No family history on file.         Medications:     Current Outpatient Medications   Medication Sig     acetaminophen (TYLENOL) 500 MG tablet Take 1,000 mg by mouth 3 times daily     aspirin (ASA) 325 MG EC tablet  Take 1 tablet (325 mg) by mouth daily     carbidopa (LODOSYN) 25 MG TABS tablet Take 25 mg by mouth 3 times daily     carbidopa-levodopa (SINEMET)  MG per tablet Take 1.5 tablets by mouth 3 times daily With food     carbidopa-levodopa (SINEMET)  MG tablet Take 1 tablet by mouth every evening as needed     citalopram (CELEXA) 20 MG tablet Take 20 mg by mouth every morning      cyclobenzaprine (FLEXERIL) 10 MG tablet Take 10 mg by mouth every morning      droxidopa (NORTHERA) 100 MG capsule Take 200 mg by mouth 3 times daily     ergotamine-caffeine (CAFERGOT) 1-100 MG tablet Take 2 tablets by mouth daily as needed for headaches or migraine Two tablets at onset of attack; then 1 tablet every 30 minutes as needed; maximum: 6 tablets per attack; do not exceed 10 tablets/week.     LEUPROLIDE ACETATE, 6 MONTH, SC Inject Subcutaneous every 6 months (Patient not taking: Reported on 3/29/2022)     midodrine (PROAMATINE) 5 MG tablet Take 15 mg by mouth 3 times daily     order for DME ADJUSTABLE LIGHTWEIGHT WALKER (Patient not taking: Reported on 3/29/2022)     oxyCODONE (ROXICODONE) 5 MG tablet Take 1 tablet (5 mg) by mouth 4 times daily as needed for severe pain     Probiotic Product (PROBIOTIC DAILY PO) Take 1 capsule by mouth every evening      senna-docusate (SENOKOT-S/PERICOLACE) 8.6-50 MG tablet Take 1 tablet by mouth 2 times daily     SUMAtriptan (IMITREX) 50 MG tablet Take 1 tablet (50 mg) by mouth daily as needed for migraine     vitamin D3 (CHOLECALCIFEROL) 50 mcg (2000 units) tablet Take 1 tablet (50 mcg) by mouth daily     No current facility-administered medications for this visit.              Review of Systems:   A comprehensive 10 point review of systems (constitutional, ENT, cardiac, peripheral vascular, lymphatic, respiratory, GI, , Musculoskeletal, skin, Neurological) was performed and found to be negative except as described in this note.     See intake form completed by patient

## 2022-09-09 NOTE — PROGRESS NOTES
This is a recent snapshot of the patient's [facility] medical record.  For current drug dose and complete information and questions, call [phone] or In Basket pool, fv home infusion ([id number])  CSN Number:  [id number]

## 2022-09-23 NOTE — PROGRESS NOTES
This is a recent snapshot of the patient's [facility] medical record.  For current drug dose and complete information and questions, call [phone] or In Basket pool, fv home infusion ([id number])  CSN Number:  [id number]

## 2022-10-03 NOTE — PROGRESS NOTES
This is a recent snapshot of the patient's [facility] medical record.  For current drug dose and complete information and questions, call [phone] or In Basket pool, fv home infusion ([id number])  CSN Number:  [id number]

## 2022-10-03 NOTE — PROGRESS NOTES
This is a recent snapshot of the patient's [facility] medical record.  For current drug dose and complete information and questions, call [phone] or In Basket pool, fv home infusion ([id number])  CSN Number:  [id number]

## 2022-10-06 NOTE — PROGRESS NOTES
This is a recent snapshot of the patient's [facility] medical record.  For current drug dose and complete information and questions, call [phone] or In Basket pool, fv home infusion ([id number])  CSN Number:  [id number]

## 2022-10-27 NOTE — PROGRESS NOTES
This is a recent snapshot of the patient's [facility] medical record.  For current drug dose and complete information and questions, call [phone] or In Basket pool, fv home infusion ([id number])  CSN Number:  [id number]

## 2022-11-05 NOTE — PROGRESS NOTES
This is a recent snapshot of the patient's [facility] medical record.  For current drug dose and complete information and questions, call [phone] or In Basket pool, fv home infusion ([id number])  CSN Number:  [id number]

## 2022-11-21 NOTE — PROGRESS NOTES
This is a recent snapshot of the patient's [facility] medical record.  For current drug dose and complete information and questions, call [phone] or In Basket pool, fv home infusion ([id number])  CSN Number:  [id number]

## 2022-12-01 NOTE — PROGRESS NOTES
This is a recent snapshot of the patient's [facility] medical record.  For current drug dose and complete information and questions, call [phone] or In Basket pool, fv home infusion ([id number])  CSN Number:  [id number]

## 2023-04-10 NOTE — PROGRESS NOTES
This is a recent snapshot of the patient's [facility] medical record.  For current drug dose and complete information and questions, call [phone] or In Basket pool, fv home infusion ([id number])  CSN Number:  [id number]

## 2023-04-11 NOTE — PROGRESS NOTES
This is a recent snapshot of the patient's [facility] medical record.  For current drug dose and complete information and questions, call [phone] or In Basket pool, fv home infusion ([id number])  CSN Number:  [id number]

## 2023-04-20 NOTE — PROGRESS NOTES
This is a recent snapshot of the patient's [facility] medical record.  For current drug dose and complete information and questions, call [phone] or In Basket pool, fv home infusion ([id number])  CSN Number:  [id number]

## 2023-04-25 NOTE — PROGRESS NOTES
This is a recent snapshot of the patient's [facility] medical record.  For current drug dose and complete information and questions, call [phone] or In [facility] pool, fv home infusion ([id number])  CSN Number:  [id number]

## 2023-06-28 NOTE — PROGRESS NOTES
This is a recent snapshot of the patient's [facility] medical record.  For current drug dose and complete information and questions, call [phone] or In Basket pool, fv home infusion ([id number])  CSN Number:  [id number]

## (undated) DEVICE — LINEN TOWEL PACK X5 5464

## (undated) DEVICE — BNDG ROLLER GAUZE CONFORM 2"X4YD 41-52

## (undated) DEVICE — DRAPE IOBAN INCISE 23X17" 6650EZ

## (undated) DEVICE — STPL SKIN 35W ROTATING HEAD PRW35

## (undated) DEVICE — KIT PATIENT CARE HANA TABLE PROFX SUPINE 6855

## (undated) DEVICE — SOL WATER IRRIG 1000ML BOTTLE 2F7114

## (undated) DEVICE — GLOVE PROTEXIS POWDER FREE 8.0 ORTHOPEDIC 2D73ET80

## (undated) DEVICE — BLADE CLIPPER 4412A

## (undated) DEVICE — PACK HIP NAILING SOP15HNSB

## (undated) DEVICE — SOL NACL 0.9% IRRIG 1000ML BOTTLE 2F7124

## (undated) DEVICE — GOWN XXLG REINFORCED 9071EL

## (undated) DEVICE — Device

## (undated) DEVICE — ADH SKIN CLOSURE PREMIERPRO EXOFIN 1.0ML 3470

## (undated) DEVICE — BLADE SAW SAGITTAL MICROAIRE 1200-006

## (undated) DEVICE — HOOD FLYTE W/PEELAWAY 408-800-100

## (undated) DEVICE — SPONGE LAP 18X18" X8435

## (undated) DEVICE — BLADE SAW SAGITTAL STRK 25X79.5X1.24MM 4/2000 2108-318-000

## (undated) DEVICE — SU ETHIBOND 5 V-37 4X30" MB66G

## (undated) DEVICE — DRSG TEGADERM 4X10" 1627

## (undated) DEVICE — ESU GROUND PAD UNIVERSAL W/O CORD

## (undated) DEVICE — GLOVE PROTEXIS BLUE W/NEU-THERA 8.0  2D73EB80

## (undated) DEVICE — DRAPE POUCH INSTRUMENT 1018

## (undated) DEVICE — IMM LIMB ELEVATOR DC40-0203

## (undated) DEVICE — BONE CEMENT MIXEVAC HI VAC W/CARTRIDGE 0306-563-000

## (undated) DEVICE — GLOVE PROTEXIS POWDER FREE 7.5 ORTHOPEDIC 2D73ET75

## (undated) DEVICE — GLOVE PROTEXIS BLUE W/NEU-THERA 8.5  2D73EB85

## (undated) DEVICE — DRSG KERLIX FLUFFS X5

## (undated) DEVICE — DRSG XEROFORM 1X8"

## (undated) DEVICE — DEVICE RETRIEVER HEWSON 71111579

## (undated) DEVICE — SPONGE BONE WICK FEMORAL W/SUCTION ATTACHMENT 0206-714-000

## (undated) DEVICE — MANIFOLD NEPTUNE 4 PORT 700-20

## (undated) DEVICE — BNDG COBAN 2"X5YDS UNSTERILE 2082

## (undated) DEVICE — DRSG TEGADERM 4X4 3/4" 1626

## (undated) DEVICE — DRSG GAUZE 4X4" 3033

## (undated) DEVICE — PAD FOAM MCGUIRE KIT 0814-0150

## (undated) DEVICE — CEMENT PRESSURIZER FEMORAL CANAL MED 0206-546-000

## (undated) DEVICE — SU MONOCRYL 3-0 PS-2 27" Y427H

## (undated) DEVICE — DRAPE C-ARM 60X42" 1013

## (undated) DEVICE — GLOVE SENSICARE PI ORTHO PF 6.5 LATEX FREE MSG9465

## (undated) DEVICE — PREP CHLORAPREP 26ML TINTED ORANGE  260815

## (undated) DEVICE — DRSG TEGADERM 6X8" 1628

## (undated) DEVICE — STPL SKIN 35W APPOSE 8886803712

## (undated) DEVICE — SU VICRYL 0 CTX CR 8X18" J764D

## (undated) DEVICE — GLOVE PROTEXIS MICRO 8.5  2D73PM85

## (undated) DEVICE — BLADE SAW SAGITTAL STRK XSHORT 25X9.5X0.6MM 2108-145-000

## (undated) DEVICE — GOWN IMPERVIOUS SPECIALTY XL/XLONG 39049

## (undated) DEVICE — DRAPE U SPLIT 74X120" 29440

## (undated) DEVICE — CLIP ETHICON LIGACLIP LG YELLOW LT400

## (undated) DEVICE — SU VICRYL 0 CP-1 27" J467H

## (undated) DEVICE — BONE CLEANING TIP INTERPULSE  0210-010-000

## (undated) DEVICE — SU VICRYL 2-0 X-1 27" UND J459H

## (undated) DEVICE — GLOVE PROTEXIS BLUE W/NEU-THERA 7.5  2D73EB75

## (undated) DEVICE — SU VICRYL 2-0 CT-1 27" J339H

## (undated) DEVICE — PIN GUARD 10-1-001 101001PBX

## (undated) DEVICE — TOURNIQUET CUFF 30" STERILE

## (undated) DEVICE — CAST PADDING 4" STERILE 9044S

## (undated) DEVICE — PACK EXTREMITY SOP15EXFSD

## (undated) DEVICE — SPONGE RAY-TEC 4X8" 7318

## (undated) DEVICE — CAST PADDING 4" UNSTERILE 9044

## (undated) DEVICE — DRAPE SHEET REV FOLD 3/4 9349

## (undated) DEVICE — PREP SKIN SCRUB TRAY 4461A

## (undated) DEVICE — SU PROLENE 3-0 PS-2 18" 8687H

## (undated) DEVICE — WIRE GUIDE 3.2X400MM  357.399

## (undated) DEVICE — SU PDO 1 STRATAFIX 36X36CM CTX TAPERPOINT SXPD2B405

## (undated) DEVICE — PACK TOTAL HIP W/POUCH SOP15HPFSM

## (undated) DEVICE — BLADE KNIFE SURG 15 371115

## (undated) DEVICE — BIT DRL 413MM 4.3MM

## (undated) DEVICE — SU VICRYL 0 CT-1 27" J340H

## (undated) DEVICE — SUCTION CANISTER MEDIVAC LINER 3000ML W/LID 65651-530

## (undated) DEVICE — SUCTION IRR SYSTEM W/O TIP INTERPULSE HANDPIECE 0210-100-000

## (undated) DEVICE — GLOVE PROTEXIS BLUE W/NEU-THERA 6.5  2D73EB65

## (undated) DEVICE — BNDG ELASTIC 6" DBL LENGTH UNSTERILE 6611-16

## (undated) DEVICE — SUCTION MANIFOLD NEPTUNE SGL

## (undated) DEVICE — SU VICRYL 2-0 CT-1 27" UND J259H

## (undated) DEVICE — BONE CLEANING TIP INTERPULSE FEMORAL CANAL 0210-008-000

## (undated) DEVICE — DRAPE CONVERTORS U-DRAPE 60X72" 8476

## (undated) RX ORDER — FENTANYL CITRATE 50 UG/ML
INJECTION, SOLUTION INTRAMUSCULAR; INTRAVENOUS
Status: DISPENSED
Start: 2017-05-11

## (undated) RX ORDER — DEXAMETHASONE SODIUM PHOSPHATE 4 MG/ML
INJECTION, SOLUTION INTRA-ARTICULAR; INTRALESIONAL; INTRAMUSCULAR; INTRAVENOUS; SOFT TISSUE
Status: DISPENSED
Start: 2022-01-01

## (undated) RX ORDER — PROPOFOL 10 MG/ML
INJECTION, EMULSION INTRAVENOUS
Status: DISPENSED
Start: 2017-05-11

## (undated) RX ORDER — GLYCOPYRROLATE 0.2 MG/ML
INJECTION, SOLUTION INTRAMUSCULAR; INTRAVENOUS
Status: DISPENSED
Start: 2022-01-01

## (undated) RX ORDER — TRANEXAMIC ACID 10 MG/ML
INJECTION, SOLUTION INTRAVENOUS
Status: DISPENSED
Start: 2022-01-01

## (undated) RX ORDER — PROPOFOL 10 MG/ML
INJECTION, EMULSION INTRAVENOUS
Status: DISPENSED
Start: 2022-01-01

## (undated) RX ORDER — NEOSTIGMINE METHYLSULFATE 1 MG/ML
VIAL (ML) INJECTION
Status: DISPENSED
Start: 2022-01-01

## (undated) RX ORDER — FENTANYL CITRATE 50 UG/ML
INJECTION, SOLUTION INTRAMUSCULAR; INTRAVENOUS
Status: DISPENSED
Start: 2022-01-01

## (undated) RX ORDER — TRANEXAMIC ACID 650 MG/1
TABLET ORAL
Status: DISPENSED
Start: 2022-01-01

## (undated) RX ORDER — LIDOCAINE HYDROCHLORIDE 20 MG/ML
INJECTION, SOLUTION EPIDURAL; INFILTRATION; INTRACAUDAL; PERINEURAL
Status: DISPENSED
Start: 2017-05-11

## (undated) RX ORDER — ALBUMIN, HUMAN INJ 5% 5 %
SOLUTION INTRAVENOUS
Status: DISPENSED
Start: 2022-01-01

## (undated) RX ORDER — ONDANSETRON 2 MG/ML
INJECTION INTRAMUSCULAR; INTRAVENOUS
Status: DISPENSED
Start: 2022-01-01

## (undated) RX ORDER — KETOROLAC TROMETHAMINE 30 MG/ML
INJECTION, SOLUTION INTRAMUSCULAR; INTRAVENOUS
Status: DISPENSED
Start: 2017-05-11

## (undated) RX ORDER — ONDANSETRON 2 MG/ML
INJECTION INTRAMUSCULAR; INTRAVENOUS
Status: DISPENSED
Start: 2017-05-11

## (undated) RX ORDER — LIDOCAINE HYDROCHLORIDE 20 MG/ML
INJECTION, SOLUTION EPIDURAL; INFILTRATION; INTRACAUDAL; PERINEURAL
Status: DISPENSED
Start: 2022-01-01

## (undated) RX ORDER — CLINDAMYCIN PHOSPHATE 900 MG/50ML
INJECTION, SOLUTION INTRAVENOUS
Status: DISPENSED
Start: 2022-01-01

## (undated) RX ORDER — BUPIVACAINE HYDROCHLORIDE 5 MG/ML
INJECTION, SOLUTION EPIDURAL; INTRACAUDAL
Status: DISPENSED
Start: 2022-01-01

## (undated) RX ORDER — HYDROMORPHONE HYDROCHLORIDE 1 MG/ML
INJECTION, SOLUTION INTRAMUSCULAR; INTRAVENOUS; SUBCUTANEOUS
Status: DISPENSED
Start: 2022-01-01

## (undated) RX ORDER — VANCOMYCIN HYDROCHLORIDE 1 G/20ML
INJECTION, POWDER, LYOPHILIZED, FOR SOLUTION INTRAVENOUS
Status: DISPENSED
Start: 2022-01-01

## (undated) RX ORDER — EPINEPHRINE 1 MG/ML
INJECTION, SOLUTION INTRAMUSCULAR; SUBCUTANEOUS
Status: DISPENSED
Start: 2022-01-01

## (undated) RX ORDER — HYDROMORPHONE HCL IN WATER/PF 6 MG/30 ML
PATIENT CONTROLLED ANALGESIA SYRINGE INTRAVENOUS
Status: DISPENSED
Start: 2022-01-01

## (undated) RX ORDER — CLINDAMYCIN PHOSPHATE 900 MG/50ML
INJECTION, SOLUTION INTRAVENOUS
Status: DISPENSED
Start: 2017-05-11

## (undated) RX ORDER — DEXAMETHASONE SODIUM PHOSPHATE 4 MG/ML
INJECTION, SOLUTION INTRA-ARTICULAR; INTRALESIONAL; INTRAMUSCULAR; INTRAVENOUS; SOFT TISSUE
Status: DISPENSED
Start: 2017-05-11